# Patient Record
Sex: FEMALE | Race: WHITE | NOT HISPANIC OR LATINO | URBAN - METROPOLITAN AREA
[De-identification: names, ages, dates, MRNs, and addresses within clinical notes are randomized per-mention and may not be internally consistent; named-entity substitution may affect disease eponyms.]

---

## 2017-03-23 ENCOUNTER — OUTPATIENT (OUTPATIENT)
Dept: OUTPATIENT SERVICES | Facility: HOSPITAL | Age: 62
LOS: 1 days | Discharge: HOME | End: 2017-03-23

## 2017-04-24 PROBLEM — Z00.00 ENCOUNTER FOR PREVENTIVE HEALTH EXAMINATION: Status: ACTIVE | Noted: 2017-04-24

## 2017-05-01 ENCOUNTER — APPOINTMENT (OUTPATIENT)
Dept: CARDIOLOGY | Facility: CLINIC | Age: 62
End: 2017-05-01

## 2017-05-01 VITALS
HEIGHT: 64 IN | SYSTOLIC BLOOD PRESSURE: 114 MMHG | BODY MASS INDEX: 36.7 KG/M2 | DIASTOLIC BLOOD PRESSURE: 68 MMHG | WEIGHT: 215 LBS

## 2017-05-01 VITALS — HEART RATE: 76 BPM

## 2017-05-01 DIAGNOSIS — Z83.3 FAMILY HISTORY OF DIABETES MELLITUS: ICD-10-CM

## 2017-05-01 DIAGNOSIS — Z87.891 PERSONAL HISTORY OF NICOTINE DEPENDENCE: ICD-10-CM

## 2017-05-01 DIAGNOSIS — Z82.49 FAMILY HISTORY OF ISCHEMIC HEART DISEASE AND OTHER DISEASES OF THE CIRCULATORY SYSTEM: ICD-10-CM

## 2017-05-01 RX ORDER — METOPROLOL TARTRATE 25 MG/1
25 TABLET, FILM COATED ORAL TWICE DAILY
Qty: 180 | Refills: 3 | Status: ACTIVE | COMMUNITY

## 2017-05-01 RX ORDER — ASPIRIN 81 MG
81 TABLET, DELAYED RELEASE (ENTERIC COATED) ORAL DAILY
Refills: 0 | Status: ACTIVE | COMMUNITY

## 2017-05-01 RX ORDER — ATORVASTATIN CALCIUM 40 MG/1
40 TABLET, FILM COATED ORAL DAILY
Refills: 0 | Status: ACTIVE | COMMUNITY

## 2017-05-01 RX ORDER — CLOPIDOGREL BISULFATE 75 MG/1
75 TABLET, FILM COATED ORAL DAILY
Qty: 30 | Refills: 6 | Status: ACTIVE | COMMUNITY

## 2017-06-27 DIAGNOSIS — Z98.61 CORONARY ANGIOPLASTY STATUS: ICD-10-CM

## 2017-06-27 DIAGNOSIS — E78.4 OTHER HYPERLIPIDEMIA: ICD-10-CM

## 2017-06-27 DIAGNOSIS — I25.2 OLD MYOCARDIAL INFARCTION: ICD-10-CM

## 2017-06-27 DIAGNOSIS — E11.9 TYPE 2 DIABETES MELLITUS WITHOUT COMPLICATIONS: ICD-10-CM

## 2017-06-27 DIAGNOSIS — Z82.49 FAMILY HISTORY OF ISCHEMIC HEART DISEASE AND OTHER DISEASES OF THE CIRCULATORY SYSTEM: ICD-10-CM

## 2017-06-27 DIAGNOSIS — R06.02 SHORTNESS OF BREATH: ICD-10-CM

## 2017-06-27 DIAGNOSIS — E66.9 OBESITY, UNSPECIFIED: ICD-10-CM

## 2017-06-27 DIAGNOSIS — Z79.4 LONG TERM (CURRENT) USE OF INSULIN: ICD-10-CM

## 2017-06-27 DIAGNOSIS — I10 ESSENTIAL (PRIMARY) HYPERTENSION: ICD-10-CM

## 2017-06-27 DIAGNOSIS — I25.10 ATHEROSCLEROTIC HEART DISEASE OF NATIVE CORONARY ARTERY WITHOUT ANGINA PECTORIS: ICD-10-CM

## 2017-08-14 ENCOUNTER — APPOINTMENT (OUTPATIENT)
Dept: CARDIOLOGY | Facility: CLINIC | Age: 62
End: 2017-08-14

## 2017-08-14 VITALS
WEIGHT: 209 LBS | BODY MASS INDEX: 35.68 KG/M2 | DIASTOLIC BLOOD PRESSURE: 60 MMHG | HEART RATE: 79 BPM | SYSTOLIC BLOOD PRESSURE: 104 MMHG | HEIGHT: 64 IN

## 2017-11-06 ENCOUNTER — APPOINTMENT (OUTPATIENT)
Dept: CARDIOLOGY | Facility: CLINIC | Age: 62
End: 2017-11-06

## 2017-12-04 ENCOUNTER — APPOINTMENT (OUTPATIENT)
Dept: CARDIOLOGY | Facility: CLINIC | Age: 62
End: 2017-12-04

## 2018-02-25 ENCOUNTER — INPATIENT (INPATIENT)
Facility: HOSPITAL | Age: 63
LOS: 0 days | Discharge: HOME | End: 2018-02-26
Attending: INTERNAL MEDICINE

## 2018-02-25 VITALS
OXYGEN SATURATION: 98 % | WEIGHT: 195.99 LBS | TEMPERATURE: 96 F | SYSTOLIC BLOOD PRESSURE: 139 MMHG | DIASTOLIC BLOOD PRESSURE: 72 MMHG | RESPIRATION RATE: 20 BRPM | HEART RATE: 70 BPM

## 2018-02-25 DIAGNOSIS — Z98.890 OTHER SPECIFIED POSTPROCEDURAL STATES: Chronic | ICD-10-CM

## 2018-02-25 LAB
ALBUMIN SERPL ELPH-MCNC: 4 G/DL — SIGNIFICANT CHANGE UP (ref 3–5.5)
ALP SERPL-CCNC: 68 U/L — SIGNIFICANT CHANGE UP (ref 30–115)
ALT FLD-CCNC: 16 U/L — SIGNIFICANT CHANGE UP (ref 0–41)
ANION GAP SERPL CALC-SCNC: 10 MMOL/L — SIGNIFICANT CHANGE UP (ref 7–14)
ANION GAP SERPL CALC-SCNC: 8 MMOL/L — SIGNIFICANT CHANGE UP (ref 7–14)
AST SERPL-CCNC: 23 U/L — SIGNIFICANT CHANGE UP (ref 0–41)
B-TYPE NATRIURETIC PEPTIDE BNP RESULT: 14 PG/ML — SIGNIFICANT CHANGE UP (ref 0–99)
BASOPHILS # BLD AUTO: 0.04 K/UL — SIGNIFICANT CHANGE UP (ref 0–0.2)
BASOPHILS NFR BLD AUTO: 0.9 % — SIGNIFICANT CHANGE UP (ref 0–1)
BILIRUB SERPL-MCNC: 0.7 MG/DL — SIGNIFICANT CHANGE UP (ref 0.2–1.2)
BUN SERPL-MCNC: 20 MG/DL — SIGNIFICANT CHANGE UP (ref 10–20)
BUN SERPL-MCNC: 22 MG/DL — HIGH (ref 10–20)
CALCIUM SERPL-MCNC: 9.7 MG/DL — SIGNIFICANT CHANGE UP (ref 8.5–10.1)
CALCIUM SERPL-MCNC: 9.9 MG/DL — SIGNIFICANT CHANGE UP (ref 8.5–10.1)
CHLORIDE SERPL-SCNC: 101 MMOL/L — SIGNIFICANT CHANGE UP (ref 98–110)
CHLORIDE SERPL-SCNC: 104 MMOL/L — SIGNIFICANT CHANGE UP (ref 98–110)
CHOLEST SERPL-MCNC: 180 MG/DL — SIGNIFICANT CHANGE UP (ref 100–200)
CK MB BLD-MCNC: 1 % — SIGNIFICANT CHANGE UP (ref 0–4)
CK MB CFR SERPL CALC: 0.7 NG/ML — SIGNIFICANT CHANGE UP (ref 0.6–6.3)
CK MB CFR SERPL CALC: 0.9 NG/ML — SIGNIFICANT CHANGE UP (ref 0.6–6.3)
CK MB CFR SERPL CALC: 0.9 NG/ML — SIGNIFICANT CHANGE UP (ref 0.6–6.3)
CK SERPL-CCNC: 53 U/L — SIGNIFICANT CHANGE UP (ref 0–225)
CK SERPL-CCNC: 61 U/L — SIGNIFICANT CHANGE UP (ref 0–225)
CK SERPL-CCNC: 68 U/L — SIGNIFICANT CHANGE UP (ref 0–225)
CO2 SERPL-SCNC: 27 MMOL/L — SIGNIFICANT CHANGE UP (ref 17–32)
CO2 SERPL-SCNC: 28 MMOL/L — SIGNIFICANT CHANGE UP (ref 17–32)
CREAT SERPL-MCNC: 1 MG/DL — SIGNIFICANT CHANGE UP (ref 0.7–1.5)
CREAT SERPL-MCNC: 1.1 MG/DL — SIGNIFICANT CHANGE UP (ref 0.7–1.5)
EOSINOPHIL # BLD AUTO: 0.11 K/UL — SIGNIFICANT CHANGE UP (ref 0–0.7)
EOSINOPHIL NFR BLD AUTO: 2.4 % — SIGNIFICANT CHANGE UP (ref 0–8)
GLUCOSE SERPL-MCNC: 103 MG/DL — SIGNIFICANT CHANGE UP (ref 70–110)
GLUCOSE SERPL-MCNC: 90 MG/DL — SIGNIFICANT CHANGE UP (ref 70–110)
HCT VFR BLD CALC: 32.8 % — LOW (ref 37–47)
HCT VFR BLD CALC: 34.4 % — LOW (ref 37–47)
HDLC SERPL-MCNC: 39 MG/DL — LOW (ref 40–60)
HGB BLD-MCNC: 11.1 G/DL — LOW (ref 14–18)
HGB BLD-MCNC: 11.6 G/DL — LOW (ref 14–18)
IMM GRANULOCYTES NFR BLD AUTO: 0 % — LOW (ref 0.1–0.3)
LIPID PNL WITH DIRECT LDL SERPL: 115 MG/DL — HIGH (ref 50–100)
LYMPHOCYTES # BLD AUTO: 2.61 K/UL — SIGNIFICANT CHANGE UP (ref 1.2–3.4)
LYMPHOCYTES # BLD AUTO: 56 % — HIGH (ref 20.5–51.1)
MCHC RBC-ENTMCNC: 28.5 PG — SIGNIFICANT CHANGE UP (ref 27–31)
MCHC RBC-ENTMCNC: 28.7 PG — SIGNIFICANT CHANGE UP (ref 27–31)
MCHC RBC-ENTMCNC: 33.7 G/DL — SIGNIFICANT CHANGE UP (ref 32–37)
MCHC RBC-ENTMCNC: 33.8 G/DL — SIGNIFICANT CHANGE UP (ref 32–37)
MCV RBC AUTO: 84.5 FL — SIGNIFICANT CHANGE UP (ref 81–91)
MCV RBC AUTO: 84.8 FL — SIGNIFICANT CHANGE UP (ref 81–91)
MONOCYTES # BLD AUTO: 0.4 K/UL — SIGNIFICANT CHANGE UP (ref 0.1–0.6)
MONOCYTES NFR BLD AUTO: 8.6 % — SIGNIFICANT CHANGE UP (ref 1.7–9.3)
NEUTROPHILS # BLD AUTO: 1.5 K/UL — SIGNIFICANT CHANGE UP (ref 1.4–6.5)
NEUTROPHILS NFR BLD AUTO: 32.1 % — LOW (ref 42.2–75.2)
NRBC # BLD: 0 /100 WBCS — SIGNIFICANT CHANGE UP (ref 0–0)
NRBC # BLD: 0 /100 WBCS — SIGNIFICANT CHANGE UP (ref 0–0)
PLATELET # BLD AUTO: 321 K/UL — SIGNIFICANT CHANGE UP (ref 130–400)
PLATELET # BLD AUTO: 341 K/UL — SIGNIFICANT CHANGE UP (ref 130–400)
POTASSIUM SERPL-MCNC: 4 MMOL/L — SIGNIFICANT CHANGE UP (ref 3.5–5)
POTASSIUM SERPL-MCNC: 4.2 MMOL/L — SIGNIFICANT CHANGE UP (ref 3.5–5)
POTASSIUM SERPL-SCNC: 4 MMOL/L — SIGNIFICANT CHANGE UP (ref 3.5–5)
POTASSIUM SERPL-SCNC: 4.2 MMOL/L — SIGNIFICANT CHANGE UP (ref 3.5–5)
PROT SERPL-MCNC: 6.5 G/DL — SIGNIFICANT CHANGE UP (ref 6–8)
RBC # BLD: 3.87 M/UL — LOW (ref 4.2–5.4)
RBC # BLD: 4.07 M/UL — LOW (ref 4.2–5.4)
RBC # FLD: 13.7 % — SIGNIFICANT CHANGE UP (ref 11.5–14.5)
RBC # FLD: 13.7 % — SIGNIFICANT CHANGE UP (ref 11.5–14.5)
SODIUM SERPL-SCNC: 138 MMOL/L — SIGNIFICANT CHANGE UP (ref 135–146)
SODIUM SERPL-SCNC: 140 MMOL/L — SIGNIFICANT CHANGE UP (ref 135–146)
TOTAL CHOLESTEROL/HDL RATIO MEASUREMENT: 4.6 RATIO — SIGNIFICANT CHANGE UP (ref 4–5.5)
TRIGL SERPL-MCNC: 160 MG/DL — HIGH (ref 40–150)
TROPONIN I SERPL-MCNC: <0.02 NG/ML — SIGNIFICANT CHANGE UP (ref 0–0.05)
WBC # BLD: 4.66 K/UL — LOW (ref 4.8–10.8)
WBC # BLD: 5.88 K/UL — SIGNIFICANT CHANGE UP (ref 4.8–10.8)
WBC # FLD AUTO: 4.66 K/UL — LOW (ref 4.8–10.8)
WBC # FLD AUTO: 5.88 K/UL — SIGNIFICANT CHANGE UP (ref 4.8–10.8)

## 2018-02-25 RX ORDER — ONDANSETRON 8 MG/1
4 TABLET, FILM COATED ORAL ONCE
Qty: 0 | Refills: 0 | Status: COMPLETED | OUTPATIENT
Start: 2018-02-25 | End: 2018-02-25

## 2018-02-25 RX ORDER — NITROGLYCERIN 6.5 MG
0.4 CAPSULE, EXTENDED RELEASE ORAL ONCE
Qty: 0 | Refills: 0 | Status: COMPLETED | OUTPATIENT
Start: 2018-02-25 | End: 2018-02-25

## 2018-02-25 RX ORDER — CLOPIDOGREL BISULFATE 75 MG/1
75 TABLET, FILM COATED ORAL DAILY
Qty: 0 | Refills: 0 | Status: DISCONTINUED | OUTPATIENT
Start: 2018-02-25 | End: 2018-02-26

## 2018-02-25 RX ORDER — NITROGLYCERIN 6.5 MG
0.3 CAPSULE, EXTENDED RELEASE ORAL ONCE
Qty: 0 | Refills: 0 | Status: DISCONTINUED | OUTPATIENT
Start: 2018-02-25 | End: 2018-02-25

## 2018-02-25 RX ORDER — ACETAMINOPHEN 500 MG
650 TABLET ORAL ONCE
Qty: 0 | Refills: 0 | Status: COMPLETED | OUTPATIENT
Start: 2018-02-25 | End: 2018-02-25

## 2018-02-25 RX ORDER — ATORVASTATIN CALCIUM 80 MG/1
40 TABLET, FILM COATED ORAL AT BEDTIME
Qty: 0 | Refills: 0 | Status: DISCONTINUED | OUTPATIENT
Start: 2018-02-25 | End: 2018-02-26

## 2018-02-25 RX ORDER — ENOXAPARIN SODIUM 100 MG/ML
80 INJECTION SUBCUTANEOUS ONCE
Qty: 0 | Refills: 0 | Status: DISCONTINUED | OUTPATIENT
Start: 2018-02-25 | End: 2018-02-25

## 2018-02-25 RX ORDER — ASPIRIN/CALCIUM CARB/MAGNESIUM 324 MG
162 TABLET ORAL ONCE
Qty: 0 | Refills: 0 | Status: COMPLETED | OUTPATIENT
Start: 2018-02-25 | End: 2018-02-25

## 2018-02-25 RX ORDER — METOPROLOL TARTRATE 50 MG
25 TABLET ORAL
Qty: 0 | Refills: 0 | Status: DISCONTINUED | OUTPATIENT
Start: 2018-02-25 | End: 2018-02-26

## 2018-02-25 RX ORDER — ASPIRIN/CALCIUM CARB/MAGNESIUM 324 MG
81 TABLET ORAL DAILY
Qty: 0 | Refills: 0 | Status: DISCONTINUED | OUTPATIENT
Start: 2018-02-25 | End: 2018-02-26

## 2018-02-25 RX ORDER — ENOXAPARIN SODIUM 100 MG/ML
90 INJECTION SUBCUTANEOUS ONCE
Qty: 0 | Refills: 0 | Status: COMPLETED | OUTPATIENT
Start: 2018-02-25 | End: 2018-02-25

## 2018-02-25 RX ORDER — MORPHINE SULFATE 50 MG/1
2 CAPSULE, EXTENDED RELEASE ORAL ONCE
Qty: 0 | Refills: 0 | Status: DISCONTINUED | OUTPATIENT
Start: 2018-02-25 | End: 2018-02-25

## 2018-02-25 RX ORDER — INFLUENZA VIRUS VACCINE 15; 15; 15; 15 UG/.5ML; UG/.5ML; UG/.5ML; UG/.5ML
0.5 SUSPENSION INTRAMUSCULAR ONCE
Qty: 0 | Refills: 0 | Status: COMPLETED | OUTPATIENT
Start: 2018-02-25 | End: 2018-02-25

## 2018-02-25 RX ADMIN — Medication 25 MILLIGRAM(S): at 17:27

## 2018-02-25 RX ADMIN — ONDANSETRON 4 MILLIGRAM(S): 8 TABLET, FILM COATED ORAL at 01:54

## 2018-02-25 RX ADMIN — ENOXAPARIN SODIUM 90 MILLIGRAM(S): 100 INJECTION SUBCUTANEOUS at 17:26

## 2018-02-25 RX ADMIN — ATORVASTATIN CALCIUM 40 MILLIGRAM(S): 80 TABLET, FILM COATED ORAL at 22:12

## 2018-02-25 RX ADMIN — Medication 162 MILLIGRAM(S): at 01:38

## 2018-02-25 RX ADMIN — Medication 650 MILLIGRAM(S): at 04:00

## 2018-02-25 RX ADMIN — Medication 0.4 MILLIGRAM(S): at 12:18

## 2018-02-25 RX ADMIN — Medication 25 MILLIGRAM(S): at 06:24

## 2018-02-25 RX ADMIN — CLOPIDOGREL BISULFATE 75 MILLIGRAM(S): 75 TABLET, FILM COATED ORAL at 12:18

## 2018-02-25 RX ADMIN — MORPHINE SULFATE 2 MILLIGRAM(S): 50 CAPSULE, EXTENDED RELEASE ORAL at 10:13

## 2018-02-25 RX ADMIN — Medication 650 MILLIGRAM(S): at 03:19

## 2018-02-25 RX ADMIN — MORPHINE SULFATE 2 MILLIGRAM(S): 50 CAPSULE, EXTENDED RELEASE ORAL at 10:34

## 2018-02-25 RX ADMIN — Medication 81 MILLIGRAM(S): at 12:18

## 2018-02-25 NOTE — H&P ADULT - NSHPLABSRESULTS_GEN_ALL_CORE
11.1   5.88  )-----------( 321      ( 25 Feb 2018 01:54 )             32.8       02-25    140  |  104  |  22<H>  ----------------------------<  90  4.0   |  28  |  1.1    Ca    9.7      25 Feb 2018 01:54    TPro  6.5  /  Alb  4.0  /  TBili  0.7  /  DBili  x   /  AST  23  /  ALT  16  /  AlkPhos  68  02-25      CARDIAC MARKERS ( 25 Feb 2018 01:54 )  <0.02 ng/mL / x     / 68 U/L / x     / 0.9 ng/mL 11.1   5.88  )-----------( 321      ( 25 Feb 2018 01:54 )             32.8       02-25    140  |  104  |  22<H>  ----------------------------<  90  4.0   |  28  |  1.1    Ca    9.7      25 Feb 2018 01:54    TPro  6.5  /  Alb  4.0  /  TBili  0.7  /  DBili  x   /  AST  23  /  ALT  16  /  AlkPhos  68  02-25      CARDIAC MARKERS ( 25 Feb 2018 01:54 )  <0.02 ng/mL / x     / 68 U/L / x     / 0.9 ng/mL    CXR : left pleural effusion , [facility],   f/u official result

## 2018-02-25 NOTE — H&P ADULT - HISTORY OF PRESENT ILLNESS
61yo F known to have history of HTN, DL, DMII, CAD s/p STEMI with PCI to prox RCA with JUSTEN in feb 2017, then PCI with JUSTEN to 1st diagonal in march 2017 by Dr Ellis.   She presents to the ED after she had sudden onset of left sided chest pain , at rest , radiating to the left shoulder , constant, lasting for ore than 1 hour. No other symptoms. She reports that she is been similar but milder pain at rest , but this pain was more constant.   It's similar to the pain of her last heart attack.   Last saw Dr ellis in oct 2017 61yo F known to have history of HTN, DL, DMII, CAD s/p STEMI with PCI to prox RCA with JUSTEN in feb 2017, then PCI with JUSTEN to 1st diagonal in march 2017 by Dr Ellis.   She presents to the ED after she had sudden onset of left sided chest pain around 11pm this past night , at rest , radiating to the left shoulder , constant, lasting for ore than 1 hour. No other symptoms. She reports that she is been similar but milder pain at rest , but this pain was more constant.   It's similar to the pain of her last heart attack.   Last saw Dr ellis in oct 2017

## 2018-02-25 NOTE — CONSULT NOTE ADULT - ASSESSMENT
1-Chest pain r/o ischemic pain vs musculoskeletal in setting of anxiety   2-CAD s/p PCI x2 in 2017      Plan;  -tele  -2Decho  -Trend CE x3   -Continue ASA, Plavix  -Continue Statin, BB  -Lovenox 1mg/Kg x1 today.   -If CE neg , adenosine nuclear stress test    Will continue to follow 1-Chest pain r/o ischemic pain vs musculoskeletal in setting of anxiety   2-CAD s/p PCI x2 in 2017      Plan;  -tele  -2Decho  -Trend CE x3   -Continue ASA, Plavix  -Continue Statin, BB  -Lovenox 1mg/Kg x1 today.   -If CE neg , adenosine nuclear stress test    Will continue to follow     Attending: Patient seen and examined. D/w the fellow. Agree with findings as documented by the fellow.  CE negative.  Atypical chest pain. D/c Lovenox.  C/w medical therapy.  Stress MPI today. If negative for ischemia, d/c home today.

## 2018-02-25 NOTE — H&P ADULT - NSHPREVIEWOFSYSTEMS_GEN_ALL_CORE
REVIEW OF SYSTEMS:    CONSTITUTIONAL: No weakness, fevers or chills  EYES/ENT: No visual changes;  No vertigo or throat pain   NECK: No pain or stiffness  RESPIRATORY: No cough, wheezing, hemoptysis; No shortness of breath  GASTROINTESTINAL: No abdominal or epigastric pain. No nausea, vomiting, or hematemesis; No diarrhea or constipation. No melena or hematochezia.  GENITOURINARY: No dysuria, frequency or hematuria  NEUROLOGICAL: No numbness or weakness  SKIN: No itching, rashes

## 2018-02-25 NOTE — H&P ADULT - ASSESSMENT
63yo F known to have history of HTN, DL, DMII, CAD s/p PCI to RCA and 1st Dg 03/2017 with JUSTEN, presented with acute onset of chest pain, at rest, radiating to the left shoulder , severe and constant for more than 1 hour.     vital stable. ECG no acute changes. CE 1set neg    #chest pain- r/o ACS  -admit to tele  -ECG, 2nd set of cardiac enzy  -ASA, plavix, Metoprolol, atorvastatin  -HDL profile  -2d echo  -cardiology consult    #DM II-  -hold OAD, FS AC, if > 180, start insulin   -check Hba1C    # DVT PPX 61yo F known to have history of HTN, DL, DMII, CAD s/p PCI to RCA and 1st Dg 03/2017 with JUSTEN, presented with acute onset of chest pain, at rest, radiating to the left shoulder , severe and constant for more than 1 hour.     vital stable. ECG no acute changes. CE 1set neg    #Chest pain- r/o ACS  -admit to tele  -ECG, 2nd set of cardiac enzy  -ASA, plavix, Metoprolol, atorvastatin  -HDL profile  -2d echo  -cardiology consult    #DM II  -hold OAD, FS AC, if > 180, start insulin   -check Hba1C    # DVT PPX, out of bed to chair, full code

## 2018-02-25 NOTE — ED PROVIDER NOTE - MEDICAL DECISION MAKING DETAILS
I have personally performed a history and physical exam on this patient and personally directed the management of the patient. Patient presents with chest pain that started suddenly on the left side it is non-exertional in nature with no fevers and no chills.  On physical exam the patient is nc/at perrla eomi oropharynx clear cta b/l, rrr s1s2 noted abd soft nt nd bs +e xt from with no edema at this time based on her past medical history we obtained ekg which in non-ischemic, we obtained cardiac enzymes which are negative at this time. I will admit the patient for repeat cardiac enzymes and potential provacative testing.   family updated with the plan of care

## 2018-02-25 NOTE — CONSULT NOTE ADULT - SUBJECTIVE AND OBJECTIVE BOX
CHIEF COMPLAINT: Chest pain    HISTORY OF PRESENT ILLNESS:     This is a 62 years old  female patient with hx of CAD s/p STEMI with PCI to prox RCA with JUSTEN in 2017, then PCI with JUSTEN to 1st diagonal in 2017 by Dr Ellis.  known to have history of HTN, DL, DMII, CAD s/p STEMI        She presents to the ED after she had sudden onset of left sided chest pain around 11pm this past night , at rest , radiating to the left shoulder , constant, lasting for ore than 1 hour. No other symptoms. She reports that she is been similar but milder pain at rest , but this pain was more constant.   It's similar to the pain of her last heart attack.   Last saw Dr ellis in oct 2017 (2018 03:54)    PAST MEDICAL & SURGICAL HISTORY:  ST elevation myocardial infarction (STEMI), unspecified artery  CAD (coronary artery disease)  DM (diabetes mellitus)  HTN (hypertension)  S/P excision of lipoma: shoulder  S/p bilateral carpal tunnel release    FAMILY HISTORY:  No pertinent family history in first degree relatives    Allergies    No Known Allergies    Intolerances    	  Home Medications:  aspirin 81 mg oral tablet: 1 tab(s) orally once a day (2018 03:56)  atorvastatin 40 mg oral tablet: 1 tab(s) orally once a day (2018 03:56)  glimepiride 4 mg oral tablet: 1 tab(s) orally 2 times a day (2018 03:56)  Janumet 50 mg-1000 mg oral tablet: 1 tab(s) orally 2 times a day (2018 03:56)  Metoprolol Tartrate 25 mg oral tablet: 1 tab(s) orally 2 times a day (2018 03:56)  Plavix 75 mg oral tablet: 1 tab(s) orally once a day (2018 03:56)    MEDICATIONS  (STANDING):  aspirin enteric coated 81 milliGRAM(s) Oral daily  atorvastatin 40 milliGRAM(s) Oral at bedtime  clopidogrel Tablet 75 milliGRAM(s) Oral daily  metoprolol     tartrate 25 milliGRAM(s) Oral two times a day    MEDICATIONS  (PRN):        SOCIAL HISTORY:    [ ] Non-smoker  [ ] Smoker  [ ] Alcohol      REVIEW OF SYSTEMS:    PHYSICAL EXAM:  T(C): 35.9 (18 @ 06:15), Max: 35.9 (18 @ 06:15)  HR: 64 (18 @ 06:15) (64 - 70)  BP: 124/63 (18 @ 06:15) (108/57 - 139/72)  RR: 18 (18 @ 06:15) (18 - 20)  SpO2: 98% (18 @ 06:15) (98% - 98%)  Wt(kg): --  I&O's Summary    Daily Height in cm: 165.1 (2018 06:15)    Daily Weight in k (2018 06:15)    General Appearance: Normal	  Cardiovascular: Normal S1 S2, No JVD, No murmurs, No edema  Respiratory: Lungs clear to auscultation	  Psychiatry: A & O x 3, Mood & affect appropriate  Gastrointestinal:  Soft, Non-tender  Skin: No rashes, No ecchymoses, No cyanosis	  Neurologic: Non-focal  Extremities: Normal range of motion, No clubbing, cyanosis or edema  Vascular: Peripheral pulses palpable 2+ bilaterally        LABS:	 	                        11.6   4.66  )-----------( 341      ( 2018 10:09 )             34.4         138  |  101  |  20  ----------------------------<  103  4.2   |  27  |  1.0      140  |  104  |  22<H>  ----------------------------<  90  4.0   |  28  |  1.1    Ca    9.9      2018 10:09  Ca    9.7      2018 01:54    TPro  6.5  /  Alb  4.0  /  TBili  0.7  /  DBili  x   /  AST  23  /  ALT  16  /  AlkPhos  68        proBNP:   Lipid Profile:   HgA1c: Hemoglobin A1C, Whole Blood: 6.0 % ( @ 10:09)    TSH:       CARDIAC MARKERS:  Troponin I, Serum: <0.02 ng/mL ( @ 10:09)  Troponin I, Serum: <0.02 ng/mL ( @ 01:54)            TELEMETRY EVENTS: 	    ECG:  	  RADIOLOGY:  	    PREVIOUS DIAGNOSTIC TESTING:    [ ] Echocardiogram:  [ ]  Catheterization:  [ ] Stress Test:
CHIEF COMPLAINT: Chest pain    HISTORY OF PRESENT ILLNESS:     This is a 62 years old  female patient with hx of CAD s/p STEMI with PCI to prox RCA with UJSTEN in 2017, then PCI with JUSTEN to 1st diagonal in 2017 by Dr Monroy.  known to have history of HTN, DL, DMII, CAD presented to ED for continuous left sided chest pain radiating to left shoulder and neck. Increased mild movement or activity. not related to respiratory movement. Shoulder pain increase with arm movement. Not improving SL nitro partially relieved with morphine. EKG showed normal sinus rhythm with no  acute ischemic ST/T wave changes. Cardiac enzyme negative x2. Patient seen at beside, looks anxious still complaining of chest discomfort. no active shortness of breath or palpitations.    Since last PCI patient complained with similar episode of chest pain that last few hours then resolved by itself.     PAST MEDICAL & SURGICAL HISTORY:  ST elevation myocardial infarction (STEMI), unspecified artery  CAD (coronary artery disease)  DM (diabetes mellitus)  HTN (hypertension)  S/P excision of lipoma: shoulder  S/p bilateral carpal tunnel release    FAMILY HISTORY:  No pertinent family history in first degree relatives    Allergies  No Known Allergies      	  Home Medications:  aspirin 81 mg oral tablet: 1 tab(s) orally once a day (2018 03:56)  atorvastatin 40 mg oral tablet: 1 tab(s) orally once a day (2018 03:56)  glimepiride 4 mg oral tablet: 1 tab(s) orally 2 times a day (2018 03:56)  Janumet 50 mg-1000 mg oral tablet: 1 tab(s) orally 2 times a day (2018 03:56)  Metoprolol Tartrate 25 mg oral tablet: 1 tab(s) orally 2 times a day (2018 03:56)  Plavix 75 mg oral tablet: 1 tab(s) orally once a day (2018 03:56)    MEDICATIONS  (STANDING):  aspirin enteric coated 81 milliGRAM(s) Oral daily  atorvastatin 40 milliGRAM(s) Oral at bedtime  clopidogrel Tablet 75 milliGRAM(s) Oral daily  metoprolol     tartrate 25 milliGRAM(s) Oral two times a day      PHYSICAL EXAM:  T(C): 35.9 (18 @ 06:15), Max: 35.9 (18 @ 06:15)  HR: 64 (18 @ 06:15) (64 - 70)  BP: 124/63 (18 @ 06:15) (108/57 - 139/72)  RR: 18 (18 @ 06:15) (18 - 20)  SpO2: 98% (18 @ 06:15) (98% - 98%)    Daily Height in cm: 165.1 (2018 06:15)    Daily Weight in k (2018 06:15)    General Appearance: Normal	  Cardiovascular: Normal S1 S2, No JVD, No murmurs, No edema  Respiratory: Lungs clear to auscultation	shoulder tenderness upon raising the left arm   Psychiatry: A & O x 3, Mood & affect appropriate  Gastrointestinal:  Soft, Non-tender  Extremities: Normal range of motion, No clubbing, cyanosis or edema          LABS:	 	                        11.6   4.66  )-----------( 341      ( 2018 10:09 )             34.4         138  |  101  |  20  ----------------------------<  103  4.2   |  27  |  1.0      140  |  104  |  22<H>  ----------------------------<  90  4.0   |  28  |  1.1    Ca    9.9      2018 10:09  Ca    9.7      2018 01:54    TPro  6.5  /  Alb  4.0  /  TBili  0.7  /  DBili  x   /  AST  23  /  ALT  16  /  AlkPhos  68          CARDIAC MARKERS:  Troponin I, Serum: <0.02 ng/mL ( @ 10:09)  Troponin I, Serum: <0.02 ng/mL ( @ 01:54)        TELEMETRY EVENTS: sinus rhythm no major events 	    ECG:  < from: 12 Lead ECG (18 @ 05:57) >  Normal sinus rhythm  Low voltage QRS    < end of copied text >  	  RADIOLOGY: CXR negative  	    PREVIOUS DIAGNOSTIC TESTING:    [ ] Echocardiogram: 2017  Summary   Clinical question: s/p 1 stent. 0222.17, HTN, DM, CHOL, LVF.   Left ventricle: Systolic function was at the lower limits of normal by visual   assessment. Ejection fraction was estimated in the range of 50 % to 55 %.   There was hypokinesis of the basal inferolateral wall(s). Doppler parameters   were consistent with abnormal left ventricular relaxation (grade 1 diastolic   dysfunction).   Tricuspid valve: There was mild regurgitation.     [ ]  Catheterization:  2017  CORONARY CIRCULATION: The coronary circulation is right dominant. There was   severe 1-vessel coronary artery disease (RCA). Left main: Normal. LAD: The   vessel was medium sized. Angiography showed minor luminal irregularities with   no flow limiting lesions. 1st diagonal: There was a tubular 85 % stenosis.   There was BELLA grade 3 flow through the vessel (brisk flow). Circumflex: The   vessel was medium sized (co-dominant). Angiography showed minor luminal   irregularities with no flow limiting lesions. Proximal RCA: There was a 100 %   stenosis.   A successful balloon angioplasty with stent was   performed on the 100 % lesion in the proximal RCA. Following intervention   there was an excellent angiographic appearance with a 0 % residual stenosis.     3/23/2017    1ST LESION INTERVENTIONS: A balloon angioplasty with stent was performed on   the 90 % lesion in the 1st diagonal. Following intervention there was a 0 %   residual stenosis.

## 2018-02-25 NOTE — H&P ADULT - NSHPPHYSICALEXAM_GEN_ALL_CORE
T(C): 35.7 (02-25-18 @ 00:52), Max: 35.7 (02-25-18 @ 00:52)  HR: 67 (02-25-18 @ 03:19) (67 - 70)  BP: 108/57 (02-25-18 @ 03:19) (108/57 - 139/72)  RR: 18 (02-25-18 @ 03:19) (18 - 20)  SpO2: 98% (02-25-18 @ 03:19) (98% - 98%)    Vital Signs Last 24 Hrs  T(C): 35.7 (25 Feb 2018 00:52), Max: 35.7 (25 Feb 2018 00:52)  T(F): 96.2 (25 Feb 2018 00:52), Max: 96.2 (25 Feb 2018 00:52)  HR: 67 (25 Feb 2018 03:19) (67 - 70)  BP: 108/57 (25 Feb 2018 03:19) (108/57 - 139/72)  BP(mean): --  RR: 18 (25 Feb 2018 03:19) (18 - 20)  SpO2: 98% (25 Feb 2018 03:19) (98% - 98%)    PHYSICAL EXAM:  GENERAL: NAD, well-developed  HEAD:  Atraumatic, Normocephalic  EYES: EOMI, PERRLA, conjunctiva and sclera clear  ENT: Normal tympanic membrane. No nasal obstruction or discharge. No tonsillar exudate, swelling or erythema.  NECK: Supple, No JVD  CHEST/LUNG: Clear to auscultation bilaterally; No wheeze  HEART: Regular rate and rhythm; No murmurs, rubs, or gallops  ABDOMEN: Soft, Nontender, Nondistended; Bowel sounds present  EXTREMITIES:  2+ Peripheral Pulses, No clubbing, cyanosis, or edema  PSYCH: AAOx3  NEUROLOGY: non-focal  SKIN: No rashes or lesions

## 2018-02-25 NOTE — H&P ADULT - ATTENDING COMMENTS
This is 61 yo Female with PMH of HTN, DM type 2 and CAD, STEMI s/p PCI to prox RCA with JUSTEN in feb 2017, then PCI with JUSTEN to 1st diagonal in march 2017 by Dr Monroy. She came to ED for left sided chest pain started last night while she was sitting at home, the pain radiates to left shoulder 10/10 lasted > 30 minutes, she has no nasuea or vomiting, no sweating,   In the ED her vital signs were stable, EKG showed no acue ischemic changes, cardiac enzymes were negative.   patient was admitted to Telemetry to rule out ACS.     Physical exam:   Alert, oriented x3, in no distress.   HEENT: PERRLA,   neck: no JVD  Chest: Clear, no rales or crackles.   Heart: RRR s1 s2, no murmurs  Abdomen: soft, non-tender. no megalies.   Ext: no edema, pulses are full b/l.     A/P:   Chest Pain: hx of STEMI, s/p PCI  BELLA score: 2, cardiac enzymes x2 negative  Continue ASA, Plavix, metoprolol and Lipitor.   Cardiology consult, possible NST.     HTN:   Continue Metoprolol.     DM type 2:   hold oral agent, HISS .

## 2018-02-25 NOTE — PATIENT PROFILE ADULT. - LANGUAGE ASSISTANCE NEEDED
No-Patient/Caregiver offered and refused free interpretation services.
No-Patient/Caregiver offered and refused free interpretation services.

## 2018-02-25 NOTE — ED PROVIDER NOTE - OBJECTIVE STATEMENT
61yo F hx HTN DM DL CAD sp PCI x2 by Dr. Monroy last year p/w L side chest pain x1hr, started out of nowhere, constant, non radiating, +nausea and LUE numbness- no weakness, no f/c/v/d, shortness of breath, abdominal pain, dysuria/hematuria, back pain

## 2018-02-26 ENCOUNTER — TRANSCRIPTION ENCOUNTER (OUTPATIENT)
Age: 63
End: 2018-02-26

## 2018-02-26 VITALS
DIASTOLIC BLOOD PRESSURE: 53 MMHG | TEMPERATURE: 97 F | SYSTOLIC BLOOD PRESSURE: 111 MMHG | RESPIRATION RATE: 18 BRPM | HEART RATE: 79 BPM

## 2018-02-26 LAB
ANION GAP SERPL CALC-SCNC: 9 MMOL/L — SIGNIFICANT CHANGE UP (ref 7–14)
BUN SERPL-MCNC: 23 MG/DL — HIGH (ref 10–20)
CALCIUM SERPL-MCNC: 9 MG/DL — SIGNIFICANT CHANGE UP (ref 8.5–10.1)
CHLORIDE SERPL-SCNC: 100 MMOL/L — SIGNIFICANT CHANGE UP (ref 98–110)
CO2 SERPL-SCNC: 23 MMOL/L — SIGNIFICANT CHANGE UP (ref 17–32)
CREAT SERPL-MCNC: 0.9 MG/DL — SIGNIFICANT CHANGE UP (ref 0.7–1.5)
GLUCOSE SERPL-MCNC: 97 MG/DL — SIGNIFICANT CHANGE UP (ref 70–110)
HCT VFR BLD CALC: 34.2 % — LOW (ref 37–47)
HGB BLD-MCNC: 11.4 G/DL — LOW (ref 14–18)
MCHC RBC-ENTMCNC: 28.5 PG — SIGNIFICANT CHANGE UP (ref 27–31)
MCHC RBC-ENTMCNC: 33.3 G/DL — SIGNIFICANT CHANGE UP (ref 32–37)
MCV RBC AUTO: 85.5 FL — SIGNIFICANT CHANGE UP (ref 81–91)
NRBC # BLD: 0 /100 WBCS — SIGNIFICANT CHANGE UP (ref 0–0)
PLATELET # BLD AUTO: 298 K/UL — SIGNIFICANT CHANGE UP (ref 130–400)
POTASSIUM SERPL-MCNC: 4.1 MMOL/L — SIGNIFICANT CHANGE UP (ref 3.5–5)
POTASSIUM SERPL-SCNC: 4.1 MMOL/L — SIGNIFICANT CHANGE UP (ref 3.5–5)
RBC # BLD: 4 M/UL — LOW (ref 4.2–5.4)
RBC # FLD: 13.7 % — SIGNIFICANT CHANGE UP (ref 11.5–14.5)
SODIUM SERPL-SCNC: 132 MMOL/L — LOW (ref 135–146)
WBC # BLD: 4.46 K/UL — LOW (ref 4.8–10.8)
WBC # FLD AUTO: 4.46 K/UL — LOW (ref 4.8–10.8)

## 2018-02-26 RX ORDER — ISOSORBIDE DINITRATE 5 MG/1
1 TABLET ORAL
Qty: 28 | Refills: 0
Start: 2018-02-26 | End: 2018-03-11

## 2018-02-26 RX ORDER — ADENOSINE 3 MG/ML
60 INJECTION INTRAVENOUS ONCE
Qty: 0 | Refills: 0 | Status: DISCONTINUED | OUTPATIENT
Start: 2018-02-26 | End: 2018-02-26

## 2018-02-26 RX ADMIN — Medication 81 MILLIGRAM(S): at 15:59

## 2018-02-26 RX ADMIN — CLOPIDOGREL BISULFATE 75 MILLIGRAM(S): 75 TABLET, FILM COATED ORAL at 15:58

## 2018-02-26 NOTE — PROGRESS NOTE ADULT - SUBJECTIVE AND OBJECTIVE BOX
DARIELA WALKER  62y  Female      Patient is a 62y old  Female who presents with a chief complaint of chest pain (26 Feb 2018 16:08)      INTERVAL HPI/OVERNIGHT EVENTS:  She still with mild left chest  pain.     Vital Signs Last 24 Hrs  T(C): 35.9 (26 Feb 2018 15:10), Max: 36.3 (25 Feb 2018 20:55)  T(F): 96.6 (26 Feb 2018 15:10), Max: 97.3 (25 Feb 2018 20:55)  HR: 79 (26 Feb 2018 15:10) (75 - 79)  BP: 111/53 (26 Feb 2018 15:10) (94/44 - 123/57)  BP(mean): --  RR: 18 (26 Feb 2018 15:10) (16 - 18)  SpO2: --            Consultant(s) Notes Reviewed:  [x ] YES  [ ] NO          MEDICATIONS  (STANDING):  adenosine Injectable (ADENOSCAN) 60 milliGRAM(s) IV Bolus once  aspirin enteric coated 81 milliGRAM(s) Oral daily  atorvastatin 40 milliGRAM(s) Oral at bedtime  clopidogrel Tablet 75 milliGRAM(s) Oral daily  metoprolol     tartrate 25 milliGRAM(s) Oral two times a day    MEDICATIONS  (PRN):      LABS                          11.4   4.46  )-----------( 298      ( 26 Feb 2018 06:51 )             34.2     02-26    132<L>  |  100  |  23<H>  ----------------------------<  97  4.1   |  23  |  0.9    Ca    9.0      26 Feb 2018 06:51    TPro  6.5  /  Alb  4.0  /  TBili  0.7  /  DBili  x   /  AST  23  /  ALT  16  /  AlkPhos  68  02-25          Lactate Trend    CARDIAC MARKERS ( 25 Feb 2018 18:26 )  <0.02 ng/mL / x     / 53 U/L / x     / 0.7 ng/mL  CARDIAC MARKERS ( 25 Feb 2018 10:09 )  <0.02 ng/mL / x     / 61 U/L / x     / 0.9 ng/mL  CARDIAC MARKERS ( 25 Feb 2018 01:54 )  <0.02 ng/mL / x     / 68 U/L / x     / 0.9 ng/mL      CAPILLARY BLOOD GLUCOSE  203 (26 Feb 2018 16:26)            RADIOLOGY & ADDITIONAL TESTS:    Imaging Personally Reviewed:  [ ] YES  [ ] NO    HEALTH ISSUES - PROBLEM Dx:          Physical exam:   Alert, oriented x3, in no distress.   HEENT: PERRLA,   neck: no JVD  Chest: Clear, no rales or crackles.   Heart: RRR s1 s2, no murmurs  Abdomen: soft, non-tender. no megalies.   Ext: no edema, pulses are full b/l.

## 2018-02-26 NOTE — DISCHARGE NOTE ADULT - MEDICATION SUMMARY - MEDICATIONS TO TAKE
I will START or STAY ON the medications listed below when I get home from the hospital:    aspirin 81 mg oral tablet  -- 1 tab(s) by mouth once a day  -- Indication: For CAD (coronary artery disease)    Isordil Titradose 5 mg oral tablet  -- 1 tab(s) by mouth 2 times a day   -- Do not drink alcoholic beverages when taking this medication.  It is very important that you take or use this exactly as directed.  Do not skip doses or discontinue unless directed by your doctor.    -- Indication: For CHEST PAIN    glimepiride 4 mg oral tablet  -- 1 tab(s) by mouth 2 times a day  -- Indication: For DM (diabetes mellitus)    Janumet 50 mg-1000 mg oral tablet  -- 1 tab(s) by mouth 2 times a day  -- Indication: For DM (diabetes mellitus)    atorvastatin 40 mg oral tablet  -- 1 tab(s) by mouth once a day  -- Indication: For DLD    Plavix 75 mg oral tablet  -- 1 tab(s) by mouth once a day  -- Indication: For CAD (coronary artery disease)    Metoprolol Tartrate 25 mg oral tablet  -- 1 tab(s) by mouth 2 times a day  -- Indication: For HTN (hypertension)

## 2018-02-26 NOTE — DISCHARGE NOTE ADULT - HOSPITAL COURSE
63yo F known to have history of HTN, DL, DMII, CAD s/p STEMI with PCI to prox RCA with JUSTEN in feb 2017, then PCI with JUSTEN to 1st diagonal in march 2017 by Dr Ellis. She presented to the ED after she had sudden onset of left sided chest pain, at rest , radiating to the left shoulder , constant, lasting for more than 1 hour. No other symptoms. She reports that she has had similar but milder pain at rest , but this pain was more constant.   It's similar to the pain of her last heart attack.   Last saw Dr ellis in oct 2017  Cardiac enzymes were negative x2, EKG showed no acute changes. Patient completed adenosine stress test which showed no signs of ischemia. Cleared for discharge by Dr. Ellis and instructed to follow up as an outpatient. Pt can continue aspirin, plavix, metoprolol and lipitor. Pt also to start taking isordil for anginal pain. Patients pain is currently much improved. Instructed to follow up with Dr. Ellis. Stable for discharge home, plan discussed with family at bedside.

## 2018-02-26 NOTE — DISCHARGE NOTE ADULT - CARE PLAN
Principal Discharge DX:	Chest pain  Goal:	resolution and prevention  Assessment and plan of treatment:	Pt came to the ED with sudden onset left sided chest pain with radiation to the left shoulder, constant, lasting for more than 1 hr. Similar to how she felt when she had her last MI. Cardiac enzymes were negative x3. Cardiac stress test also negative. Pt able to be discharged with her usual medication regimen, addition of Isordil for anginal pain and close follow up with Dr. Monroy.

## 2018-02-26 NOTE — DISCHARGE NOTE ADULT - PLAN OF CARE
resolution and prevention Pt came to the ED with sudden onset left sided chest pain with radiation to the left shoulder, constant, lasting for more than 1 hr. Similar to how she felt when she had her last MI. Cardiac enzymes were negative x3. Cardiac stress test also negative. Pt able to be discharged with her usual medication regimen, addition of Isordil for anginal pain and close follow up with Dr. Monroy.

## 2018-02-26 NOTE — DISCHARGE NOTE ADULT - PATIENT PORTAL LINK FT
You can access the [facility] Patient Portal, offered by [facility], by registering with the following website: [email or web address]

## 2018-02-26 NOTE — DISCHARGE NOTE ADULT - CARE PROVIDER_API CALL
Noé Monroy (MD), Cardiovascular Disease; Internal Medicine; Interventional Cardiology; Nuclear Cardiology  [address] [place] [address]  Phone: [phone]  Fax: [phone]

## 2018-02-26 NOTE — PROGRESS NOTE ADULT - ASSESSMENT
A/P:   Chest Pain: hx of STEMI, s/p PCI  BELLA score: 2, cardiac enzymes x3 negative.   Nuclear Stress test negative for acute ischemia.   Continue ASA, Plavix, metoprolol and Lipitor.   Add Isordil      HTN:   Continue Metoprolol.     DM type 2:   continue home meds.     Discharge home  follow up with her PCP and cardiology clinic.

## 2018-02-28 DIAGNOSIS — I25.2 OLD MYOCARDIAL INFARCTION: ICD-10-CM

## 2018-02-28 DIAGNOSIS — Z95.5 PRESENCE OF CORONARY ANGIOPLASTY IMPLANT AND GRAFT: ICD-10-CM

## 2018-02-28 DIAGNOSIS — Z79.84 LONG TERM (CURRENT) USE OF ORAL HYPOGLYCEMIC DRUGS: ICD-10-CM

## 2018-02-28 DIAGNOSIS — E78.5 HYPERLIPIDEMIA, UNSPECIFIED: ICD-10-CM

## 2018-02-28 DIAGNOSIS — R07.89 OTHER CHEST PAIN: ICD-10-CM

## 2018-02-28 DIAGNOSIS — E11.9 TYPE 2 DIABETES MELLITUS WITHOUT COMPLICATIONS: ICD-10-CM

## 2018-02-28 DIAGNOSIS — I10 ESSENTIAL (PRIMARY) HYPERTENSION: ICD-10-CM

## 2018-02-28 DIAGNOSIS — R07.9 CHEST PAIN, UNSPECIFIED: ICD-10-CM

## 2018-02-28 DIAGNOSIS — I25.10 ATHEROSCLEROTIC HEART DISEASE OF NATIVE CORONARY ARTERY WITHOUT ANGINA PECTORIS: ICD-10-CM

## 2018-03-12 ENCOUNTER — RECORD ABSTRACTING (OUTPATIENT)
Age: 63
End: 2018-03-12

## 2018-04-02 ENCOUNTER — APPOINTMENT (OUTPATIENT)
Dept: CARDIOLOGY | Facility: CLINIC | Age: 63
End: 2018-04-02

## 2018-04-02 VITALS
SYSTOLIC BLOOD PRESSURE: 120 MMHG | HEIGHT: 64 IN | WEIGHT: 210 LBS | DIASTOLIC BLOOD PRESSURE: 66 MMHG | BODY MASS INDEX: 35.85 KG/M2 | HEART RATE: 73 BPM

## 2018-04-02 RX ORDER — ISOSORBIDE MONONITRATE 30 MG/1
30 TABLET, EXTENDED RELEASE ORAL
Qty: 90 | Refills: 3 | Status: DISCONTINUED | COMMUNITY
End: 2018-04-02

## 2018-04-03 PROBLEM — E11.9 TYPE 2 DIABETES MELLITUS WITHOUT COMPLICATIONS: Chronic | Status: ACTIVE | Noted: 2018-02-25

## 2018-04-03 PROBLEM — I21.3 ST ELEVATION (STEMI) MYOCARDIAL INFARCTION OF UNSPECIFIED SITE: Chronic | Status: ACTIVE | Noted: 2018-02-25

## 2018-04-03 PROBLEM — I25.10 ATHEROSCLEROTIC HEART DISEASE OF NATIVE CORONARY ARTERY WITHOUT ANGINA PECTORIS: Chronic | Status: ACTIVE | Noted: 2018-02-25

## 2018-04-03 PROBLEM — I10 ESSENTIAL (PRIMARY) HYPERTENSION: Chronic | Status: ACTIVE | Noted: 2018-02-25
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
## 2018-09-14 NOTE — PATIENT PROFILE ADULT. - PRO ARRIVE FROM
Chart reviewed, and appears there were 3 scripts sent already for methylphenidate, one for 9/20/18, one for 10/20/18 and one for 11/20/18.  Reached out to pharmacy next door and spoke with Isis, who states they have all of the scripts, so nothing further should be needed at this time.    Siri Sparks RN    
[facility] faxed a SCHEDULE 2 REFILL REMINDER.    CONTROLLED SUBSTANCE MESSAGE:  This request is for a Schedule 2 medication, please WRITE A NEW PRESCRIPTION.    methylphenidate (METADATE CD) 50 MG CR capsule      Last Written Prescription Date:  9/20/2018  Last Fill Quantity: 30 capsule,   # refills: 0  Last Office Visit: 8/24/2018  w/ SUSAN Meredith    Future Office visit:    Next 5 appointments (look out 90 days)     Dec 10, 2018  6:40 AM CST   [facility] DO   [facility]    [address] [place] [id number] [phone]                   Routing refill request to provider for review/approval because:  Drug not on the [facility], [facility] or [facility] refill protocol or controlled substance    
home
home

## 2018-09-17 ENCOUNTER — APPOINTMENT (OUTPATIENT)
Dept: CARDIOLOGY | Facility: CLINIC | Age: 63
End: 2018-09-17

## 2018-09-17 VITALS
HEART RATE: 73 BPM | WEIGHT: 194 LBS | DIASTOLIC BLOOD PRESSURE: 70 MMHG | SYSTOLIC BLOOD PRESSURE: 118 MMHG | BODY MASS INDEX: 33.12 KG/M2 | HEIGHT: 64 IN

## 2018-11-19 ENCOUNTER — APPOINTMENT (OUTPATIENT)
Dept: CARDIOLOGY | Facility: CLINIC | Age: 63
End: 2018-11-19

## 2018-11-19 VITALS
SYSTOLIC BLOOD PRESSURE: 102 MMHG | HEART RATE: 74 BPM | DIASTOLIC BLOOD PRESSURE: 60 MMHG | BODY MASS INDEX: 33.29 KG/M2 | WEIGHT: 195 LBS | HEIGHT: 64 IN

## 2018-11-19 NOTE — ASSESSMENT
[FreeTextEntry1] : CAD, s/p PCI.\par Dyslipidemia, diabetes.\par Atypical chest pains. ECG - NSR, no ST changes.\par C/w medical therapy. If has recurrent pain, obtain nuclear stress test \par Diet, weight loss discussed. Continued wt. loss encouraged.\par If stable will continue with secondary prevention, DM control.\par Patient advised to continue with her current DM regimen and to see her PMD to repeat labs, including HgA1c.\par \par F/u after  the test.

## 2018-11-19 NOTE — PHYSICAL EXAM
[General Appearance - Well Developed] : well developed [Normal Appearance] : normal appearance [General Appearance - Well Nourished] : well nourished [General Appearance - In No Acute Distress] : no acute distress [Normal Conjunctiva] : the conjunctiva exhibited no abnormalities [Normal Oral Mucosa] : normal oral mucosa [Normal Oropharynx] : normal oropharynx [FreeTextEntry1] : no JVD, no bruits [] : no respiratory distress [Respiration, Rhythm And Depth] : normal respiratory rhythm and effort [Auscultation Breath Sounds / Voice Sounds] : lungs were clear to auscultation bilaterally [Heart Rate And Rhythm] : heart rate and rhythm were normal [Heart Sounds] : normal S1 and S2 [Murmurs] : no murmurs present [Arterial Pulses Normal] : the arterial pulses were normal [Edema] : no peripheral edema present [Bowel Sounds] : normal bowel sounds [Abdomen Soft] : soft [Abdomen Tenderness] : non-tender [Abnormal Walk] : normal gait [Nail Clubbing] : no clubbing of the fingernails [Cyanosis, Localized] : no localized cyanosis [Skin Color & Pigmentation] : normal skin color and pigmentation [Oriented To Time, Place, And Person] : oriented to person, place, and time [Affect] : the affect was normal

## 2018-11-19 NOTE — HISTORY OF PRESENT ILLNESS
[FreeTextEntry1] : 62 y/o female with history of DM, HTN, DL, CAD, s/p STEMI, PCI or RCA, subsequent PCI of D1, presents for f/u. Was in ED in February for atypical chest pain, ruled out. Was given isosorbide. Patient reports stable dyspnea. She reports compliance with medical therapy.  LV function was preserved. Was able to loose weight with diet. labs noted - elevated HgA1c, TG. She had no recurrent chest pain.

## 2019-01-26 ENCOUNTER — EMERGENCY (EMERGENCY)
Facility: HOSPITAL | Age: 64
LOS: 0 days | Discharge: HOME | End: 2019-01-26
Admitting: EMERGENCY MEDICAL TECHNICIAN, BASIC
Payer: MEDICAID

## 2019-01-26 VITALS
OXYGEN SATURATION: 100 % | SYSTOLIC BLOOD PRESSURE: 125 MMHG | DIASTOLIC BLOOD PRESSURE: 67 MMHG | HEART RATE: 89 BPM | RESPIRATION RATE: 18 BRPM | TEMPERATURE: 98 F

## 2019-01-26 DIAGNOSIS — I25.10 ATHEROSCLEROTIC HEART DISEASE OF NATIVE CORONARY ARTERY WITHOUT ANGINA PECTORIS: ICD-10-CM

## 2019-01-26 DIAGNOSIS — I10 ESSENTIAL (PRIMARY) HYPERTENSION: ICD-10-CM

## 2019-01-26 DIAGNOSIS — L02.91 CUTANEOUS ABSCESS, UNSPECIFIED: ICD-10-CM

## 2019-01-26 DIAGNOSIS — Z79.02 LONG TERM (CURRENT) USE OF ANTITHROMBOTICS/ANTIPLATELETS: ICD-10-CM

## 2019-01-26 DIAGNOSIS — M71.22 SYNOVIAL CYST OF POPLITEAL SPACE [BAKER], LEFT KNEE: ICD-10-CM

## 2019-01-26 DIAGNOSIS — I25.2 OLD MYOCARDIAL INFARCTION: ICD-10-CM

## 2019-01-26 DIAGNOSIS — Z79.82 LONG TERM (CURRENT) USE OF ASPIRIN: ICD-10-CM

## 2019-01-26 DIAGNOSIS — Z98.890 OTHER SPECIFIED POSTPROCEDURAL STATES: Chronic | ICD-10-CM

## 2019-01-26 DIAGNOSIS — E11.9 TYPE 2 DIABETES MELLITUS WITHOUT COMPLICATIONS: ICD-10-CM

## 2019-01-26 DIAGNOSIS — Z95.5 PRESENCE OF CORONARY ANGIOPLASTY IMPLANT AND GRAFT: ICD-10-CM

## 2019-01-26 DIAGNOSIS — Z79.84 LONG TERM (CURRENT) USE OF ORAL HYPOGLYCEMIC DRUGS: ICD-10-CM

## 2019-01-26 DIAGNOSIS — Z79.899 OTHER LONG TERM (CURRENT) DRUG THERAPY: ICD-10-CM

## 2019-01-26 PROCEDURE — 93970 EXTREMITY STUDY: CPT | Mod: 26

## 2019-01-26 NOTE — ED ADULT NURSE NOTE - PMH
CAD (coronary artery disease)    DM (diabetes mellitus)    HTN (hypertension)    ST elevation myocardial infarction (STEMI), unspecified artery

## 2019-01-26 NOTE — ED ADULT NURSE NOTE - OBJECTIVE STATEMENT
patient has left knee pain with no sob, states difficulty to bear weight, skin intact, states this pain started 2days ago, area not red, no warm, area is tender to touch pt states "its a pimple underneath skin " denies trauma

## 2019-01-26 NOTE — ED PROVIDER NOTE - OBJECTIVE STATEMENT
Pt is a 62y/o female with a pmhx CAD with stents on plavix, HTN, HLD presents today for eval of left popliteal pain x 3 days. Pt denies fever, chills, calf pain, N/V/D, Weakness.

## 2019-01-26 NOTE — ED PROVIDER NOTE - NS ED ROS FT
MS:  No myalgia, muscle weakness, joint pain or back pain.  Neuro:  No headache or weakness.  No LOC.  Skin:  No skin rash.   Endocrine: No history of thyroid disease or diabetes.  Except as documented in the HPI,  all other systems are negative.

## 2019-01-26 NOTE — ED PROVIDER NOTE - PHYSICAL EXAMINATION
VITAL SIGNS: I have reviewed nursing notes and confirm.  CONSTITUTIONAL: Well-developed; well-nourished; in no acute distress.   SKIN: skin exam is warm and dry, no acute rash.    HEAD: Normocephalic; atraumatic.  EYES: conjunctiva and sclera clear.  CARD: S1, S2 normal; no murmurs, gallops, or rubs. Regular rate and rhythm.   RESP: No wheezes, rales or rhonchi.  EXT: TTP Left popliteal region, Normal ROM.  No clubbing, cyanosis or edema.   NEURO: Alert, oriented, grossly unremarkable

## 2019-01-28 ENCOUNTER — EMERGENCY (EMERGENCY)
Facility: HOSPITAL | Age: 64
LOS: 0 days | Discharge: HOME | End: 2019-01-28
Admitting: PHYSICIAN ASSISTANT

## 2019-01-28 VITALS
OXYGEN SATURATION: 97 % | HEART RATE: 84 BPM | DIASTOLIC BLOOD PRESSURE: 70 MMHG | TEMPERATURE: 97 F | SYSTOLIC BLOOD PRESSURE: 137 MMHG | RESPIRATION RATE: 20 BRPM

## 2019-01-28 DIAGNOSIS — Z98.890 OTHER SPECIFIED POSTPROCEDURAL STATES: Chronic | ICD-10-CM

## 2019-01-28 DIAGNOSIS — E11.9 TYPE 2 DIABETES MELLITUS WITHOUT COMPLICATIONS: ICD-10-CM
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
## 2019-01-28 DIAGNOSIS — S89.92XA UNSPECIFIED INJURY OF LEFT LOWER LEG, INITIAL ENCOUNTER: ICD-10-CM

## 2019-01-28 DIAGNOSIS — I25.10 ATHEROSCLEROTIC HEART DISEASE OF NATIVE CORONARY ARTERY WITHOUT ANGINA PECTORIS: ICD-10-CM

## 2019-01-28 DIAGNOSIS — I10 ESSENTIAL (PRIMARY) HYPERTENSION: ICD-10-CM
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
## 2019-01-28 DIAGNOSIS — E78.5 HYPERLIPIDEMIA, UNSPECIFIED: ICD-10-CM

## 2019-01-28 DIAGNOSIS — Z79.82 LONG TERM (CURRENT) USE OF ASPIRIN: ICD-10-CM
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
## 2019-01-28 DIAGNOSIS — Z79.02 LONG TERM (CURRENT) USE OF ANTITHROMBOTICS/ANTIPLATELETS: ICD-10-CM
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
## 2019-01-28 DIAGNOSIS — Z79.84 LONG TERM (CURRENT) USE OF ORAL HYPOGLYCEMIC DRUGS: ICD-10-CM

## 2019-01-28 RX ORDER — OXYCODONE AND ACETAMINOPHEN 5; 325 MG/1; MG/1
1 TABLET ORAL ONCE
Qty: 0 | Refills: 0 | Status: DISCONTINUED | OUTPATIENT
Start: 2019-01-28 | End: 2019-01-28

## 2019-01-28 RX ADMIN — OXYCODONE AND ACETAMINOPHEN 1 TABLET(S): 5; 325 TABLET ORAL at 19:18

## 2019-01-28 NOTE — ED PROVIDER NOTE - PHYSICAL EXAMINATION
PHYSICAL EXAM:    GENERAL: Alert, appears stated age, well appearing, non-toxic  SKIN: Warm, pink and dry. MMM.   EYE: Normal lids/conjunctiva  ENT: Normal hearing, patent oropharynx  NECK: +supple. No meningismus  Pulm: Bilateral BS, normal resp effort, no wheezes, stridor, or retractions  CV: RRR, no M/R/G, 2+and = DP ands PT pulses  Abd: soft, non-tender, non-distended  Mskel: no erythema, cyanosis, edema. no calf tenderness. +TTP over popliteal area and TTP over medial joint line.   Neuro: AAOx3, no sensory/motor deficits, 5/5 strength throughout.

## 2019-01-28 NOTE — ED PROVIDER NOTE - MEDICAL DECISION MAKING DETAILS
pt with chronic knee pain, here 2 days ago with neg duplex, presents with twisting injury to L knee x 30 min PTA. +FROM. XR without dislocation/fracture. KNI/crutches. Reviewed all results and necessity for follow up. Counseled on red flags and to return for them.  Patient appears well on discharge.

## 2019-01-28 NOTE — ED PROVIDER NOTE - OBJECTIVE STATEMENT
How Severe Are Your Spot(S)?: mild Pt is Estonian speaking only accepts ad hoc  daughter Elizabeth   64 y/o F HTN, HLD, CAD with stents on Plavix and ASA, DM on Janumet, DVT study 2 days ago neg for DVT, chronic OA in L knee followed by Dr. Jeffrey presents with twisting injury to L knee without fall 30 min PTA. Denies CP, palpitations, SOB, back pain, abdominal pain, n/v/d, fevers, sweats, chills, direct trauma, fall, cough, recent travel, recent illness, sick contacts, urinary symptoms, rash. +FROM but with pain. Has MRI scheduled next week and appointment with Wojciech next week. What Is The Reason For Today's Visit?: Upper Body Skin Exam

## 2019-01-28 NOTE — ED PROVIDER NOTE - NS ED ROS FT
Review of Systems    Constitutional: (-) fever  Cardiovascular: (-) chest pain, (-) syncope  Respiratory: (-) cough, (-) shortness of breath  Gastrointestinal: (-) vomiting, (-) diarrhea  Musculoskeletal: (-) neck pain, (-) back pain  Integumentary: (-) rash, (-) edema  Neurological: (-) headache    **All other ROS negative except as per above/HPI**

## 2019-01-28 NOTE — ED PROVIDER NOTE - CARE PROVIDER_API CALL
Jaswinder Jeffrey (MD), Orthopaedic Surgery  [facility] [place] [address]  Phone: [phone]  Fax: [phone]

## 2019-01-28 NOTE — ED ADULT TRIAGE NOTE - CHIEF COMPLAINT QUOTE
patient reports twisting knee while walking today. reports pain. tender to touch mild swelling noted. patient in ed for knee pain few days ago

## 2019-03-25 ENCOUNTER — APPOINTMENT (OUTPATIENT)
Dept: CARDIOLOGY | Facility: CLINIC | Age: 64
End: 2019-03-25

## 2019-03-25 VITALS
SYSTOLIC BLOOD PRESSURE: 110 MMHG | HEART RATE: 78 BPM | WEIGHT: 194 LBS | BODY MASS INDEX: 33.12 KG/M2 | HEIGHT: 64 IN | DIASTOLIC BLOOD PRESSURE: 64 MMHG

## 2019-03-25 RX ORDER — EZETIMIBE 10 MG/1
10 TABLET ORAL DAILY
Refills: 0 | Status: ACTIVE | COMMUNITY

## 2019-03-25 NOTE — HISTORY OF PRESENT ILLNESS
[FreeTextEntry1] : 64 y/o female with history of DM, HTN, DL, CAD, s/p STEMI, PCI or RCA, subsequent PCI of D1, presents for f/u.  She reports compliance with medical therapy.  LV function was preserved. Last labs noted - elevated HgA1c, TG. She had no recurrent chest pain.

## 2019-03-25 NOTE — ASSESSMENT
[FreeTextEntry1] : CAD, s/p PCI.\par Dyslipidemia, diabetes.\par C/w medical therapy.\par Diet, weight loss discussed. Continued wt. loss encouraged.\par If stable will continue with secondary prevention, DM control.\par Patient advised to continue with her current DM regimen and to see her PMD to repeat labs, including HgA1c.\par \par F/u in 3-4 months.

## 2019-05-02 ENCOUNTER — OUTPATIENT (OUTPATIENT)
Dept: OUTPATIENT SERVICES | Facility: HOSPITAL | Age: 64
LOS: 1 days | Discharge: HOME | End: 2019-05-02
Payer: MEDICAID

## 2019-05-02 VITALS
SYSTOLIC BLOOD PRESSURE: 96 MMHG | TEMPERATURE: 97 F | OXYGEN SATURATION: 96 % | RESPIRATION RATE: 18 BRPM | WEIGHT: 188.5 LBS | HEIGHT: 66 IN | HEART RATE: 77 BPM | DIASTOLIC BLOOD PRESSURE: 54 MMHG

## 2019-05-02 DIAGNOSIS — Z98.890 OTHER SPECIFIED POSTPROCEDURAL STATES: Chronic | ICD-10-CM

## 2019-05-02 DIAGNOSIS — Z01.818 ENCOUNTER FOR OTHER PREPROCEDURAL EXAMINATION: ICD-10-CM

## 2019-05-02 DIAGNOSIS — S83.232D COMPLEX TEAR OF MEDIAL MENISCUS, CURRENT INJURY, LEFT KNEE, SUBSEQUENT ENCOUNTER: ICD-10-CM

## 2019-05-02 DIAGNOSIS — Z98.61 CORONARY ANGIOPLASTY STATUS: Chronic | ICD-10-CM

## 2019-05-02 LAB
ALBUMIN SERPL ELPH-MCNC: 4.5 G/DL — SIGNIFICANT CHANGE UP (ref 3.5–5.2)
ALP SERPL-CCNC: 69 U/L — SIGNIFICANT CHANGE UP (ref 30–115)
ALT FLD-CCNC: 11 U/L — SIGNIFICANT CHANGE UP (ref 0–41)
ANION GAP SERPL CALC-SCNC: 16 MMOL/L — HIGH (ref 7–14)
APTT BLD: 32.4 SEC — SIGNIFICANT CHANGE UP (ref 27–39.2)
AST SERPL-CCNC: 19 U/L — SIGNIFICANT CHANGE UP (ref 0–41)
BASOPHILS # BLD AUTO: 0.08 K/UL — SIGNIFICANT CHANGE UP (ref 0–0.2)
BASOPHILS NFR BLD AUTO: 1.6 % — HIGH (ref 0–1)
BILIRUB SERPL-MCNC: 0.3 MG/DL — SIGNIFICANT CHANGE UP (ref 0.2–1.2)
BUN SERPL-MCNC: 33 MG/DL — HIGH (ref 10–20)
CALCIUM SERPL-MCNC: 10.1 MG/DL — SIGNIFICANT CHANGE UP (ref 8.5–10.1)
CHLORIDE SERPL-SCNC: 101 MMOL/L — SIGNIFICANT CHANGE UP (ref 98–110)
CO2 SERPL-SCNC: 21 MMOL/L — SIGNIFICANT CHANGE UP (ref 17–32)
CREAT SERPL-MCNC: 1.3 MG/DL — SIGNIFICANT CHANGE UP (ref 0.7–1.5)
EOSINOPHIL # BLD AUTO: 0.33 K/UL — SIGNIFICANT CHANGE UP (ref 0–0.7)
EOSINOPHIL NFR BLD AUTO: 6.7 % — SIGNIFICANT CHANGE UP (ref 0–8)
ESTIMATED AVERAGE GLUCOSE: 128 MG/DL — HIGH (ref 68–114)
GLUCOSE SERPL-MCNC: 115 MG/DL — HIGH (ref 70–99)
HBA1C BLD-MCNC: 6.1 % — HIGH (ref 4–5.6)
HCT VFR BLD CALC: 37.1 % — SIGNIFICANT CHANGE UP (ref 37–47)
HGB BLD-MCNC: 12 G/DL — SIGNIFICANT CHANGE UP (ref 12–16)
IMM GRANULOCYTES NFR BLD AUTO: 0.2 % — SIGNIFICANT CHANGE UP (ref 0.1–0.3)
INR BLD: 0.87 RATIO — SIGNIFICANT CHANGE UP (ref 0.65–1.3)
LYMPHOCYTES # BLD AUTO: 2.7 K/UL — SIGNIFICANT CHANGE UP (ref 1.2–3.4)
LYMPHOCYTES # BLD AUTO: 54.7 % — HIGH (ref 20.5–51.1)
MCHC RBC-ENTMCNC: 27.9 PG — SIGNIFICANT CHANGE UP (ref 27–31)
MCHC RBC-ENTMCNC: 32.3 G/DL — SIGNIFICANT CHANGE UP (ref 32–37)
MCV RBC AUTO: 86.3 FL — SIGNIFICANT CHANGE UP (ref 81–99)
MONOCYTES # BLD AUTO: 0.39 K/UL — SIGNIFICANT CHANGE UP (ref 0.1–0.6)
MONOCYTES NFR BLD AUTO: 7.9 % — SIGNIFICANT CHANGE UP (ref 1.7–9.3)
NEUTROPHILS # BLD AUTO: 1.43 K/UL — SIGNIFICANT CHANGE UP (ref 1.4–6.5)
NEUTROPHILS NFR BLD AUTO: 28.9 % — LOW (ref 42.2–75.2)
NRBC # BLD: 0 /100 WBCS — SIGNIFICANT CHANGE UP (ref 0–0)
PLATELET # BLD AUTO: 363 K/UL — SIGNIFICANT CHANGE UP (ref 130–400)
POTASSIUM SERPL-MCNC: 4.5 MMOL/L — SIGNIFICANT CHANGE UP (ref 3.5–5)
POTASSIUM SERPL-SCNC: 4.5 MMOL/L — SIGNIFICANT CHANGE UP (ref 3.5–5)
PROT SERPL-MCNC: 7.3 G/DL — SIGNIFICANT CHANGE UP (ref 6–8)
PROTHROM AB SERPL-ACNC: 10 SEC — SIGNIFICANT CHANGE UP (ref 9.95–12.87)
RBC # BLD: 4.3 M/UL — SIGNIFICANT CHANGE UP (ref 4.2–5.4)
RBC # FLD: 13.3 % — SIGNIFICANT CHANGE UP (ref 11.5–14.5)
SODIUM SERPL-SCNC: 138 MMOL/L — SIGNIFICANT CHANGE UP (ref 135–146)
WBC # BLD: 4.94 K/UL — SIGNIFICANT CHANGE UP (ref 4.8–10.8)
WBC # FLD AUTO: 4.94 K/UL — SIGNIFICANT CHANGE UP (ref 4.8–10.8)

## 2019-05-02 PROCEDURE — 71046 X-RAY EXAM CHEST 2 VIEWS: CPT | Mod: 26

## 2019-05-02 PROCEDURE — 93010 ELECTROCARDIOGRAM REPORT: CPT

## 2019-05-02 NOTE — H&P PST ADULT - NSICDXPASTMEDICALHX_GEN_ALL_CORE_FT
PAST MEDICAL HISTORY:  CAD (coronary artery disease)     DM (diabetes mellitus)     HTN (hypertension)     ST elevation myocardial infarction (STEMI), unspecified artery

## 2019-05-02 NOTE — H&P PST ADULT - HISTORY OF PRESENT ILLNESS
patient presents with progressively worsening left knee pain x 1 yr. Patient denies any c/o cp, sob, palpitations fever, cough or dysuria.   Ex tolerance is very limited. Able to walk 1/2 fos with out sob.   No diagnosis of FARZANA patient presents with progressively worsening left knee pain x 1 yr. Patient denies any c/o cp, sob, palpitations fever, cough or dysuria.   Ex tolerance is very limited. Able to walk 1/2 fos with out sob.   No diagnosis of FARZANA.   BP Low at PAST; Patient asymptomatic.  RA Manual 80/58 LA manual 90/60.   Rept BP manual 94/60. Advised patient to visit Dr. Caicedo today for the adjustment of BP meds.

## 2019-05-02 NOTE — H&P PST ADULT - NSANTHBPHIGHRD_ENT_A_CORE
Subjective:       Patient ID: Kaur Carpenter is a 64 y.o. female.    Chief Complaint: Hypertension    HPI: She returns for management of hypertension.  She has had hypertension for over a year.  Current treatment has included medications outlined in medication list.  She denies chest pain or shortness of breath.  No palpitations.  Denies left arm or neck pain.  She has diabetes.  Denies polyuria, polydipsia    Medications: See med card    Social history: Does not smoke, does not drink alcohol      Review of Systems   Constitutional: Negative for chills, fatigue, fever and unexpected weight change.   Respiratory: Negative for chest tightness and shortness of breath.    Cardiovascular: Negative for chest pain and palpitations.   Gastrointestinal: Negative for abdominal pain and blood in stool.   Neurological: Negative for dizziness, syncope, numbness and headaches.       Objective:      Physical Exam   HENT:   Right Ear: External ear normal.   Left Ear: External ear normal.   Nose: Nose normal.   Mouth/Throat: Oropharynx is clear and moist.   Eyes: Pupils are equal, round, and reactive to light.   Neck: Normal range of motion.   Cardiovascular: Normal rate and regular rhythm.    No murmur heard.  Pulmonary/Chest: Breath sounds normal.   Abdominal: She exhibits no distension. There is no hepatosplenomegaly. There is no tenderness.   Lymphadenopathy:     She has no cervical adenopathy.     She has no axillary adenopathy.   Neurological: She has normal strength and normal reflexes. No cranial nerve deficit or sensory deficit.       Assessment:         assessment and plan: 1.  Hypertension: On her own, she decreased Coreg to 12.5 mg twice a day.  Continue current treatment  2.  Diabetes: Check CMP, glycosylated hemoglobin, TSH  3.  Schedule bone density  Plan:       As above  
No

## 2019-05-02 NOTE — H&P PST ADULT - REASON FOR ADMISSION
62 yo f presents to PAST for surgical arthroscopy of left knee under GA by Dr. Jeffrey at [facility] OR on 05/17/2019

## 2019-05-17 ENCOUNTER — OUTPATIENT (OUTPATIENT)
Dept: OUTPATIENT SERVICES | Facility: HOSPITAL | Age: 64
LOS: 1 days | Discharge: HOME | End: 2019-05-17
Payer: MEDICAID

## 2019-05-17 ENCOUNTER — RESULT REVIEW (OUTPATIENT)
Age: 64
End: 2019-05-17

## 2019-05-17 VITALS
SYSTOLIC BLOOD PRESSURE: 135 MMHG | DIASTOLIC BLOOD PRESSURE: 62 MMHG | HEART RATE: 72 BPM | RESPIRATION RATE: 17 BRPM | OXYGEN SATURATION: 72 %

## 2019-05-17 VITALS
OXYGEN SATURATION: 97 % | TEMPERATURE: 98 F | WEIGHT: 188.5 LBS | HEIGHT: 66 IN | DIASTOLIC BLOOD PRESSURE: 72 MMHG | SYSTOLIC BLOOD PRESSURE: 143 MMHG | HEART RATE: 88 BPM | RESPIRATION RATE: 20 BRPM

## 2019-05-17 DIAGNOSIS — Z98.890 OTHER SPECIFIED POSTPROCEDURAL STATES: Chronic | ICD-10-CM

## 2019-05-17 DIAGNOSIS — Z98.61 CORONARY ANGIOPLASTY STATUS: Chronic | ICD-10-CM

## 2019-05-17 LAB — GLUCOSE BLDC GLUCOMTR-MCNC: 135 MG/DL — HIGH (ref 70–99)

## 2019-05-17 PROCEDURE — 88304 TISSUE EXAM BY PATHOLOGIST: CPT | Mod: 26

## 2019-05-17 RX ORDER — ONDANSETRON 8 MG/1
4 TABLET, FILM COATED ORAL ONCE
Refills: 0 | Status: DISCONTINUED | OUTPATIENT
Start: 2019-05-17 | End: 2019-06-01

## 2019-05-17 RX ORDER — OXYCODONE AND ACETAMINOPHEN 5; 325 MG/1; MG/1
1 TABLET ORAL ONCE
Refills: 0 | Status: DISCONTINUED | OUTPATIENT
Start: 2019-05-17 | End: 2019-05-17

## 2019-05-17 RX ORDER — HYDROMORPHONE HYDROCHLORIDE 2 MG/ML
0.5 INJECTION INTRAMUSCULAR; INTRAVENOUS; SUBCUTANEOUS
Refills: 0 | Status: DISCONTINUED | OUTPATIENT
Start: 2019-05-17 | End: 2019-05-17

## 2019-05-17 RX ORDER — SODIUM CHLORIDE 9 MG/ML
1000 INJECTION, SOLUTION INTRAVENOUS
Refills: 0 | Status: DISCONTINUED | OUTPATIENT
Start: 2019-05-17 | End: 2019-06-01

## 2019-05-17 RX ADMIN — SODIUM CHLORIDE 100 MILLILITER(S): 9 INJECTION, SOLUTION INTRAVENOUS at 08:39

## 2019-05-17 RX ADMIN — OXYCODONE AND ACETAMINOPHEN 1 TABLET(S): 5; 325 TABLET ORAL at 09:57

## 2019-05-17 RX ADMIN — HYDROMORPHONE HYDROCHLORIDE 0.5 MILLIGRAM(S): 2 INJECTION INTRAMUSCULAR; INTRAVENOUS; SUBCUTANEOUS at 08:55

## 2019-05-17 NOTE — ASU PATIENT PROFILE, ADULT - FALL HARM RISK TYPE OF ASSESSMENT
"Interval HPI:   Overnight events:  Remains in GISSU. BG variable overnight. Transition insulin infusion suspended with hypoglycemia. IV insulin protocol restarted with BG trending up. Prednisone 7.5 mg daily.   Eating:   <25% doesn't care for the food   Nausea: No  Hypoglycemia and intervention: Yes 66   Fever: No  TPN: Yes at 75 cc/hr       BP (!) 142/65 (BP Location: Left arm, Patient Position: Lying)   Pulse 69   Temp 97.4 °F (36.3 °C) (Oral)   Resp 17   Ht 5' 10" (1.778 m)   Wt 115.7 kg (255 lb)   SpO2 98%   BMI 36.59 kg/m²     Labs Reviewed and Include    Recent Labs   Lab  09/27/18   0515   GLU  266*   CALCIUM  8.4*   ALBUMIN  1.9*   PROT  4.3*   NA  138   K  3.5   CO2  23   CL  106   BUN  57*   CREATININE  1.0   ALKPHOS  110   ALT  49*   AST  63*   BILITOT  0.5     Lab Results   Component Value Date    WBC 2.93 (L) 09/26/2018    HGB 7.3 (L) 09/26/2018    HCT 23.0 (L) 09/26/2018    MCV 98 09/26/2018    PLT 90 (L) 09/26/2018     No results for input(s): TSH, FREET4 in the last 168 hours.  Lab Results   Component Value Date    HGBA1C 6.0 (H) 09/26/2018       Nutritional status:   Body mass index is 36.59 kg/m².  Lab Results   Component Value Date    ALBUMIN 1.9 (L) 09/27/2018    ALBUMIN 2.1 (L) 09/26/2018    ALBUMIN 2.2 (L) 09/25/2018     Lab Results   Component Value Date    PREALBUMIN 10 (L) 09/14/2018       Estimated Creatinine Clearance: 88.8 mL/min (based on SCr of 1 mg/dL).    Accu-Checks  Recent Labs      09/26/18   1419  09/26/18   1536  09/26/18   1818  09/26/18   1833  09/26/18   1904  09/26/18   2102  09/26/18   2308  09/27/18   0110  09/27/18   0315  09/27/18   0523   POCTGLUCOSE  160*  142*  66*  63*  100  189*  249*  304*  291*  300*       Current Medications and/or Treatments Impacting Glycemic Control  Immunotherapy:    Immunosuppressants         Stop Route Frequency     tacrolimus capsule 0.5 mg      -- Oral Daily     tacrolimus capsule 1 mg      -- Oral Daily     tacrolimus capsule 0.5 " mg      09/22 0759 Oral 2 times daily        Steroids:   Hormones (From admission, onward)    Start     Stop Route Frequency Ordered    09/26/18 0900  predniSONE tablet 7.5 mg      -- Oral Daily 09/24/18 1446        Pressors:    Autonomic Drugs (From admission, onward)    None        Hyperglycemia/Diabetes Medications:   Antihyperglycemics (From admission, onward)    Start     Stop Route Frequency Ordered    09/26/18 2000  insulin regular (Humulin R) 100 Units in sodium chloride 0.9% 100 mL infusion     Question:  Insulin Rate Adjustment (DO NOT MODIFY ANSWER)  Answer:  [email or web address]    -- IV Continuous 09/26/18 1950         Admission

## 2019-05-17 NOTE — ASU PATIENT PROFILE, ADULT - PSH
H/O coronary angioplasty  with 2 stents  S/p bilateral carpal tunnel release    S/P excision of lipoma  shoulder

## 2019-05-17 NOTE — ASU DISCHARGE PLAN (ADULT/PEDIATRIC) - CALL YOUR DOCTOR IF YOU HAVE ANY OF THE FOLLOWING:
Pain not relieved by Medications/Bleeding that does not stop/Swelling that gets worse/Nausea and vomiting that does not stop/Numbness, tingling, color or temperature change to extremity/Wound/Surgical Site with redness, or foul smelling discharge or pus Increased irritability or sluggishness/Pain not relieved by Medications/Bleeding that does not stop/Swelling that gets worse/Fever greater than (need to indicate Fahrenheit or Celsius)/Nausea and vomiting that does not stop/Inability to tolerate liquids or foods/Numbness, tingling, color or temperature change to extremity/Wound/Surgical Site with redness, or foul smelling discharge or pus

## 2019-05-17 NOTE — ASU DISCHARGE PLAN (ADULT/PEDIATRIC) - BATHING
Do not remove dressings Do not remove dressings until 3 days. Do not remove dressings until 3 days, then you may remove them and cover the wounds with bandaids.

## 2019-05-17 NOTE — ASU DISCHARGE PLAN (ADULT/PEDIATRIC) - CARE PROVIDER_API CALL
Jaswinder Jeffrey (MD)  Orthopaedic Surgery  [address] [place] [address]  Phone: [phone]  Fax: [phone]  Follow Up Time:

## 2019-05-17 NOTE — PRE-ANESTHESIA EVALUATION ADULT - NSANTHOSAYNRD_GEN_A_CORE
No. FARZANA screening performed.  STOP BANG Legend: 0-2 = LOW Risk; 3-4 = INTERMEDIATE Risk; 5-8 = HIGH Risk

## 2019-05-17 NOTE — CHART NOTE - NSCHARTNOTEFT_GEN_A_CORE
PACU ANESTHESIA ADMISSION NOTE      Procedure: Partial medial meniscectomy of left knee    Post op diagnosis:  Tear of medial meniscus of left knee      ____  Intubated  TV:______       Rate: ______      FiO2: ______    _x___  Patent Airway    __x__  Full return of protective reflexes    __x__  Full recovery from anesthesia / back to baseline     Vitals:   T:  97.7F         R:     16             BP:     147/70             Sat:      96             P: 93      Mental Status:  __x__ Awake   ___x__ Alert   _____ Drowsy   _____ Sedated    Nausea/Vomiting:  _x___ NO  ______Yes,   See Post - Op Orders          Pain Scale (0-10):  0/10____    Treatment: ____ None    __x__ See Post - Op/PCA Orders    Post - Operative Fluids:   ____ Oral   __x__ See Post - Op Orders    Plan: Discharge:   __x__Home       _____Floor     _____Critical Care    _____  Other:_________________    Comments: pt tolerated procedure well, no anesthesia related complications. Care of pt endorsed to PACU, report given to PACU RN.

## 2019-05-17 NOTE — PRE-ANESTHESIA EVALUATION ADULT - NSANTHTIREDRD_ENT_A_CORE
Assumed pt care pt stable resting on stretcher, pt states Hx of bradycardia and states she sees cardiologist. Denies medications for bradycardia and states condition is monitored.  Denies chest pain SOB difficulty breathing
Dr Michi Sands at bedside
I have reviewed discharge instructions with the patient. The patient verbalized understanding. Pt left ED in stable condition with no distress noted in wheel chair and no complaints voiced with family.  IV discontinued with discharge
Pt and pts family states would like to speak with Dr Concepcion Ash to see if ordered CT scan is needed, Dr Concepcion Ash made aware
Pt remains stable awaiting results, will continue to monitor
Pt up ambulating to bathroom with steady gait X 1 person minimal assist. Pt tolerated ambulation well
Pt updated on plan of care
No

## 2019-05-20 LAB — SURGICAL PATHOLOGY STUDY: SIGNIFICANT CHANGE UP

## 2019-05-21 DIAGNOSIS — M23.222 DERANGEMENT OF POSTERIOR HORN OF MEDIAL MENISCUS DUE TO OLD TEAR OR INJURY, LEFT KNEE: ICD-10-CM

## 2019-05-21 DIAGNOSIS — M17.12 UNILATERAL PRIMARY OSTEOARTHRITIS, LEFT KNEE: ICD-10-CM

## 2019-08-21 NOTE — PATIENT PROFILE ADULT. - NS PRO CONTRA FLU CONTRAIND
Please follow up with your primary care physician in 24-48 hours  Please come back if any of the following: Fever, chills, nausea, vomiting, pain, chest pain, shortness of breath, numbness, tingling or any major concern
none
none

## 2019-08-26 ENCOUNTER — APPOINTMENT (OUTPATIENT)
Dept: CARDIOLOGY | Facility: CLINIC | Age: 64
End: 2019-08-26
Payer: MEDICAID

## 2019-08-26 VITALS
HEIGHT: 64 IN | SYSTOLIC BLOOD PRESSURE: 116 MMHG | HEART RATE: 88 BPM | DIASTOLIC BLOOD PRESSURE: 70 MMHG | WEIGHT: 192 LBS | BODY MASS INDEX: 32.78 KG/M2

## 2019-08-26 PROCEDURE — 99213 OFFICE O/P EST LOW 20 MIN: CPT

## 2019-08-26 PROCEDURE — 93000 ELECTROCARDIOGRAM COMPLETE: CPT

## 2019-08-26 NOTE — ASSESSMENT
[FreeTextEntry1] : CAD, s/p PCI.\par Dyslipidemia, diabetes.\par C/w medical therapy.\par Diet, weight loss discussed. Continued wt. loss encouraged.\par If stable will continue with secondary prevention, DM control.\par Patient advised to continue with her current DM regimen and to see her PMD to repeat labs, including HgA1c.\par \par F/u in 6 months.

## 2019-08-26 NOTE — HISTORY OF PRESENT ILLNESS
[FreeTextEntry1] : 62 y/o female with history of DM, HTN, DL, CAD, s/p STEMI, PCI or RCA, subsequent PCI of D1, presents for f/u.  She reports compliance with medical therapy.  LV function was preserved. Last labs noted - elevated HgA1c, TG. She had no recurrent chest pain. Had knee surgery, awaiting second knee.

## 2019-08-26 NOTE — PHYSICAL EXAM
[General Appearance - Well Developed] : well developed [Normal Appearance] : normal appearance [General Appearance - Well Nourished] : well nourished [General Appearance - In No Acute Distress] : no acute distress [Normal Conjunctiva] : the conjunctiva exhibited no abnormalities [Normal Oral Mucosa] : normal oral mucosa [Normal Oropharynx] : normal oropharynx [FreeTextEntry1] : no JVD, no bruits [] : no respiratory distress [Respiration, Rhythm And Depth] : normal respiratory rhythm and effort [Auscultation Breath Sounds / Voice Sounds] : lungs were clear to auscultation bilaterally [Heart Rate And Rhythm] : heart rate and rhythm were normal [Heart Sounds] : normal S1 and S2 [Murmurs] : no murmurs present [Arterial Pulses Normal] : the arterial pulses were normal [Edema] : no peripheral edema present [Bowel Sounds] : normal bowel sounds [Abdomen Soft] : soft [Abnormal Walk] : normal gait [Abdomen Tenderness] : non-tender [Nail Clubbing] : no clubbing of the fingernails [Cyanosis, Localized] : no localized cyanosis [Skin Color & Pigmentation] : normal skin color and pigmentation [Oriented To Time, Place, And Person] : oriented to person, place, and time [Affect] : the affect was normal

## 2020-01-20 NOTE — H&P PST ADULT - GUM GEN PE MLT EXAM PC
Asthma  stable, not used albuterol for yrs  Diabetes mellitus    Hyperthyroidism    Lupus    KACI on CPAP    Rheumatoid arthritis
not examined

## 2020-02-10 ENCOUNTER — APPOINTMENT (OUTPATIENT)
Dept: CARDIOLOGY | Facility: CLINIC | Age: 65
End: 2020-02-10
Payer: MEDICARE

## 2020-02-10 VITALS
HEART RATE: 84 BPM | HEIGHT: 64 IN | DIASTOLIC BLOOD PRESSURE: 60 MMHG | SYSTOLIC BLOOD PRESSURE: 104 MMHG | BODY MASS INDEX: 33.46 KG/M2 | WEIGHT: 196 LBS

## 2020-02-10 PROCEDURE — 93000 ELECTROCARDIOGRAM COMPLETE: CPT

## 2020-02-10 PROCEDURE — 99213 OFFICE O/P EST LOW 20 MIN: CPT

## 2020-02-10 NOTE — HISTORY OF PRESENT ILLNESS
[FreeTextEntry1] : 63 y/o female with history of DM, HTN, DL, CAD, s/p STEMI, PCI or RCA, subsequent PCI of D1, presents for f/u.  She reports compliance with medical therapy.  LV function was preserved. Last labs noted - elevated HgA1c, TG. She had no recurrent chest pain. Had knee surgery, awaiting second knee.

## 2020-02-10 NOTE — PHYSICAL EXAM
[Normal Appearance] : normal appearance [General Appearance - Well Nourished] : well nourished [General Appearance - Well Developed] : well developed [General Appearance - In No Acute Distress] : no acute distress [Normal Conjunctiva] : the conjunctiva exhibited no abnormalities [Normal Oral Mucosa] : normal oral mucosa [Normal Oropharynx] : normal oropharynx [FreeTextEntry1] : no JVD, no bruits [] : no respiratory distress [Respiration, Rhythm And Depth] : normal respiratory rhythm and effort [Auscultation Breath Sounds / Voice Sounds] : lungs were clear to auscultation bilaterally [Heart Rate And Rhythm] : heart rate and rhythm were normal [Heart Sounds] : normal S1 and S2 [Arterial Pulses Normal] : the arterial pulses were normal [Murmurs] : no murmurs present [Edema] : no peripheral edema present [Abdomen Tenderness] : non-tender [Abdomen Soft] : soft [Bowel Sounds] : normal bowel sounds [Nail Clubbing] : no clubbing of the fingernails [Abnormal Walk] : normal gait [Skin Color & Pigmentation] : normal skin color and pigmentation [Cyanosis, Localized] : no localized cyanosis [Oriented To Time, Place, And Person] : oriented to person, place, and time [Affect] : the affect was normal

## 2020-05-12 ENCOUNTER — LABORATORY RESULT (OUTPATIENT)
Age: 65
End: 2020-05-12

## 2020-05-12 ENCOUNTER — APPOINTMENT (OUTPATIENT)
Dept: OBGYN | Facility: CLINIC | Age: 65
End: 2020-05-12
Payer: MEDICARE

## 2020-05-12 VITALS — HEIGHT: 66 IN | BODY MASS INDEX: 31.5 KG/M2 | WEIGHT: 196 LBS

## 2020-05-12 DIAGNOSIS — Z78.0 ASYMPTOMATIC MENOPAUSAL STATE: ICD-10-CM

## 2020-05-12 DIAGNOSIS — R31.9 HEMATURIA, UNSPECIFIED: ICD-10-CM

## 2020-05-12 LAB
BILIRUB UR QL STRIP: NORMAL
CLARITY UR: CLEAR
GLUCOSE UR-MCNC: NORMAL
HCG UR QL: NORMAL EU/DL
HGB UR QL STRIP.AUTO: 250
KETONES UR-MCNC: NORMAL
LEUKOCYTE ESTERASE UR QL STRIP: 500
NITRITE UR QL STRIP: NORMAL
PH UR STRIP: 5
PROT UR STRIP-MCNC: NORMAL
SP GR UR STRIP: 1.01

## 2020-05-12 PROCEDURE — 81003 URINALYSIS AUTO W/O SCOPE: CPT | Mod: QW

## 2020-05-12 PROCEDURE — 99203 OFFICE O/P NEW LOW 30 MIN: CPT

## 2020-08-10 ENCOUNTER — RESULT CHARGE (OUTPATIENT)
Age: 65
End: 2020-08-10

## 2020-08-10 ENCOUNTER — APPOINTMENT (OUTPATIENT)
Dept: CARDIOLOGY | Facility: CLINIC | Age: 65
End: 2020-08-10
Payer: MEDICARE

## 2020-08-10 VITALS
WEIGHT: 195 LBS | HEIGHT: 66 IN | TEMPERATURE: 97.6 F | SYSTOLIC BLOOD PRESSURE: 102 MMHG | BODY MASS INDEX: 31.34 KG/M2 | DIASTOLIC BLOOD PRESSURE: 58 MMHG | HEART RATE: 78 BPM

## 2020-08-10 PROCEDURE — 93000 ELECTROCARDIOGRAM COMPLETE: CPT

## 2020-08-10 PROCEDURE — 99213 OFFICE O/P EST LOW 20 MIN: CPT

## 2020-08-10 RX ORDER — NITROFURANTOIN (MONOHYDRATE/MACROCRYSTALS) 25; 75 MG/1; MG/1
100 CAPSULE ORAL
Qty: 14 | Refills: 0 | Status: COMPLETED | COMMUNITY
Start: 2020-05-12 | End: 2020-08-10

## 2020-08-10 NOTE — PHYSICAL EXAM
[Normal Appearance] : normal appearance [General Appearance - Well Developed] : well developed [General Appearance - In No Acute Distress] : no acute distress [General Appearance - Well Nourished] : well nourished [Normal Conjunctiva] : the conjunctiva exhibited no abnormalities [Normal Oral Mucosa] : normal oral mucosa [Normal Oropharynx] : normal oropharynx [FreeTextEntry1] : no JVD, no bruits [Respiration, Rhythm And Depth] : normal respiratory rhythm and effort [] : no respiratory distress [Heart Rate And Rhythm] : heart rate and rhythm were normal [Heart Sounds] : normal S1 and S2 [Auscultation Breath Sounds / Voice Sounds] : lungs were clear to auscultation bilaterally [Arterial Pulses Normal] : the arterial pulses were normal [Murmurs] : no murmurs present [Bowel Sounds] : normal bowel sounds [Edema] : no peripheral edema present [Abdomen Tenderness] : non-tender [Abnormal Walk] : normal gait [Abdomen Soft] : soft [Skin Color & Pigmentation] : normal skin color and pigmentation [Cyanosis, Localized] : no localized cyanosis [Nail Clubbing] : no clubbing of the fingernails [Oriented To Time, Place, And Person] : oriented to person, place, and time [Affect] : the affect was normal

## 2020-08-10 NOTE — HISTORY OF PRESENT ILLNESS
[FreeTextEntry1] : 65 y/o female with history of DM, HTN, DL, CAD, s/p STEMI, PCI or RCA, subsequent PCI of D1, presents for f/u.  She reports compliance with medical therapy.  LV function was preserved. Last labs noted - elevated HgA1c, TG. She had no recurrent chest pain.

## 2020-08-10 NOTE — ASSESSMENT
[FreeTextEntry1] : CAD, s/p PCI.\par Dyslipidemia, diabetes.\par C/w medical therapy.\par Diet, weight loss discussed. Continued wt. loss encouraged.\par If stable will continue with secondary prevention, DM control.\par Patient advised to continue with her current DM regimen and to see her PMD to repeat labs, including HgA1c.\par If chest pain or dyspnea will obtain a stress test.\par \par F/u in 6 months if no symptoms.

## 2020-12-05 ENCOUNTER — TRANSCRIPTION ENCOUNTER (OUTPATIENT)
Age: 65
End: 2020-12-05

## 2021-02-08 ENCOUNTER — APPOINTMENT (OUTPATIENT)
Dept: CARDIOLOGY | Facility: CLINIC | Age: 66
End: 2021-02-08
Payer: MEDICARE

## 2021-02-08 VITALS
WEIGHT: 199 LBS | BODY MASS INDEX: 33.15 KG/M2 | SYSTOLIC BLOOD PRESSURE: 110 MMHG | TEMPERATURE: 97.7 F | HEIGHT: 65 IN | DIASTOLIC BLOOD PRESSURE: 60 MMHG | HEART RATE: 73 BPM

## 2021-02-08 PROCEDURE — 99214 OFFICE O/P EST MOD 30 MIN: CPT

## 2021-02-08 PROCEDURE — 93000 ELECTROCARDIOGRAM COMPLETE: CPT

## 2021-02-08 NOTE — HISTORY OF PRESENT ILLNESS
[FreeTextEntry1] : 66 y/o female with history of DM, HTN, DL, CAD, s/p STEMI, PCI or RCA, subsequent PCI of D1, presents for f/u.  She reports compliance with medical therapy.  LV function was preserved. Last labs noted - elevated HgA1c, TG. She had two episodes of recurrent chest pain. No chest pain today. No dyspnea. She feels the CP is worse with walking up the stairs.

## 2021-02-08 NOTE — ASSESSMENT
[FreeTextEntry1] : CAD, s/p PCI.\par Dyslipidemia, diabetes.\par C/w medical therapy.\par Diet, weight loss discussed. Continued wt. loss encouraged.\par If stable will continue with secondary prevention, DM control.\par Patient advised to continue with her current DM regimen and to see her PMD to repeat labs, including HgA1c.\par She reports her last HgA1c was 6.7%, which is much improved, but I don’t have the results with me to to review today.\par Will obtain a stress test, given episodes of chest pain.\par Options of cath and nuclear discussed. She would prefer non-invasive test.\par \par In the meantime, I have also advised the patient to go to the nearest emergency room if she experiences any chest pain, dyspnea, syncope, or has any other compelling symptoms.\par \par \par \par F/u after the test.

## 2021-02-08 NOTE — PHYSICAL EXAM
[General Appearance - Well Developed] : well developed [Normal Appearance] : normal appearance [General Appearance - Well Nourished] : well nourished [General Appearance - In No Acute Distress] : no acute distress [Normal Conjunctiva] : the conjunctiva exhibited no abnormalities [] : no respiratory distress [Respiration, Rhythm And Depth] : normal respiratory rhythm and effort [Auscultation Breath Sounds / Voice Sounds] : lungs were clear to auscultation bilaterally [Heart Rate And Rhythm] : heart rate and rhythm were normal [Heart Sounds] : normal S1 and S2 [Murmurs] : no murmurs present [Arterial Pulses Normal] : the arterial pulses were normal [Edema] : no peripheral edema present [Bowel Sounds] : normal bowel sounds [Abdomen Soft] : soft [Abdomen Tenderness] : non-tender [Abnormal Walk] : normal gait [Nail Clubbing] : no clubbing of the fingernails [Skin Color & Pigmentation] : normal skin color and pigmentation [Cyanosis, Localized] : no localized cyanosis [Oriented To Time, Place, And Person] : oriented to person, place, and time [Affect] : the affect was normal [FreeTextEntry1] : no JVD, no bruits

## 2021-03-22 ENCOUNTER — APPOINTMENT (OUTPATIENT)
Dept: CARDIOLOGY | Facility: CLINIC | Age: 66
End: 2021-03-22

## 2021-03-25 ENCOUNTER — NON-APPOINTMENT (OUTPATIENT)
Age: 66
End: 2021-03-25

## 2021-04-12 ENCOUNTER — APPOINTMENT (OUTPATIENT)
Dept: CARDIOLOGY | Facility: CLINIC | Age: 66
End: 2021-04-12

## 2021-06-21 ENCOUNTER — APPOINTMENT (OUTPATIENT)
Dept: CARDIOLOGY | Facility: CLINIC | Age: 66
End: 2021-06-21

## 2021-07-22 ENCOUNTER — EMERGENCY (EMERGENCY)
Facility: HOSPITAL | Age: 66
LOS: 0 days | Discharge: HOME | End: 2021-07-22
Admitting: EMERGENCY MEDICINE

## 2021-07-22 ENCOUNTER — INPATIENT (INPATIENT)
Facility: HOSPITAL | Age: 66
LOS: 0 days | Discharge: HOME | End: 2021-07-23
Attending: HOSPITALIST | Admitting: HOSPITALIST
Payer: MEDICARE

## 2021-07-22 VITALS
HEART RATE: 86 BPM | OXYGEN SATURATION: 96 % | SYSTOLIC BLOOD PRESSURE: 153 MMHG | DIASTOLIC BLOOD PRESSURE: 72 MMHG | RESPIRATION RATE: 18 BRPM | HEIGHT: 66 IN | TEMPERATURE: 97 F

## 2021-07-22 DIAGNOSIS — Z20.822 CONTACT WITH AND (SUSPECTED) EXPOSURE TO COVID-19: ICD-10-CM

## 2021-07-22 DIAGNOSIS — R07.89 OTHER CHEST PAIN: ICD-10-CM

## 2021-07-22 DIAGNOSIS — Z98.61 CORONARY ANGIOPLASTY STATUS: Chronic | ICD-10-CM

## 2021-07-22 DIAGNOSIS — E11.9 TYPE 2 DIABETES MELLITUS WITHOUT COMPLICATIONS: ICD-10-CM

## 2021-07-22 DIAGNOSIS — I25.10 ATHEROSCLEROTIC HEART DISEASE OF NATIVE CORONARY ARTERY WITHOUT ANGINA PECTORIS: ICD-10-CM

## 2021-07-22 DIAGNOSIS — I25.2 OLD MYOCARDIAL INFARCTION: ICD-10-CM

## 2021-07-22 DIAGNOSIS — Z98.890 OTHER SPECIFIED POSTPROCEDURAL STATES: Chronic | ICD-10-CM

## 2021-07-22 DIAGNOSIS — Z79.84 LONG TERM (CURRENT) USE OF ORAL HYPOGLYCEMIC DRUGS: ICD-10-CM

## 2021-07-22 DIAGNOSIS — Z95.1 PRESENCE OF AORTOCORONARY BYPASS GRAFT: ICD-10-CM

## 2021-07-22 DIAGNOSIS — Z95.5 PRESENCE OF CORONARY ANGIOPLASTY IMPLANT AND GRAFT: ICD-10-CM

## 2021-07-22 DIAGNOSIS — Z79.02 LONG TERM (CURRENT) USE OF ANTITHROMBOTICS/ANTIPLATELETS: ICD-10-CM

## 2021-07-22 DIAGNOSIS — Z79.899 OTHER LONG TERM (CURRENT) DRUG THERAPY: ICD-10-CM

## 2021-07-22 DIAGNOSIS — I10 ESSENTIAL (PRIMARY) HYPERTENSION: ICD-10-CM

## 2021-07-22 DIAGNOSIS — Z79.82 LONG TERM (CURRENT) USE OF ASPIRIN: ICD-10-CM

## 2021-07-22 LAB
ALBUMIN SERPL ELPH-MCNC: 4.6 G/DL — SIGNIFICANT CHANGE UP (ref 3.5–5.2)
ALP SERPL-CCNC: 78 U/L — SIGNIFICANT CHANGE UP (ref 30–115)
ALT FLD-CCNC: 11 U/L — SIGNIFICANT CHANGE UP (ref 0–41)
ANION GAP SERPL CALC-SCNC: 10 MMOL/L — SIGNIFICANT CHANGE UP (ref 7–14)
AST SERPL-CCNC: 20 U/L — SIGNIFICANT CHANGE UP (ref 0–41)
BASOPHILS # BLD AUTO: 0.03 K/UL — SIGNIFICANT CHANGE UP (ref 0–0.2)
BASOPHILS NFR BLD AUTO: 0.6 % — SIGNIFICANT CHANGE UP (ref 0–1)
BILIRUB SERPL-MCNC: 0.2 MG/DL — SIGNIFICANT CHANGE UP (ref 0.2–1.2)
BUN SERPL-MCNC: 22 MG/DL — HIGH (ref 10–20)
CALCIUM SERPL-MCNC: 9.9 MG/DL — SIGNIFICANT CHANGE UP (ref 8.5–10.1)
CHLORIDE SERPL-SCNC: 101 MMOL/L — SIGNIFICANT CHANGE UP (ref 98–110)
CO2 SERPL-SCNC: 29 MMOL/L — SIGNIFICANT CHANGE UP (ref 17–32)
CREAT SERPL-MCNC: 1.2 MG/DL — SIGNIFICANT CHANGE UP (ref 0.7–1.5)
EOSINOPHIL # BLD AUTO: 0.1 K/UL — SIGNIFICANT CHANGE UP (ref 0–0.7)
EOSINOPHIL NFR BLD AUTO: 1.8 % — SIGNIFICANT CHANGE UP (ref 0–8)
GLUCOSE SERPL-MCNC: 136 MG/DL — HIGH (ref 70–99)
HCT VFR BLD CALC: 32.2 % — LOW (ref 37–47)
HGB BLD-MCNC: 10.2 G/DL — LOW (ref 12–16)
IMM GRANULOCYTES NFR BLD AUTO: 0.2 % — SIGNIFICANT CHANGE UP (ref 0.1–0.3)
LYMPHOCYTES # BLD AUTO: 2.74 K/UL — SIGNIFICANT CHANGE UP (ref 1.2–3.4)
LYMPHOCYTES # BLD AUTO: 50.6 % — SIGNIFICANT CHANGE UP (ref 20.5–51.1)
MCHC RBC-ENTMCNC: 25.7 PG — LOW (ref 27–31)
MCHC RBC-ENTMCNC: 31.7 G/DL — LOW (ref 32–37)
MCV RBC AUTO: 81.1 FL — SIGNIFICANT CHANGE UP (ref 81–99)
MONOCYTES # BLD AUTO: 0.42 K/UL — SIGNIFICANT CHANGE UP (ref 0.1–0.6)
MONOCYTES NFR BLD AUTO: 7.8 % — SIGNIFICANT CHANGE UP (ref 1.7–9.3)
NEUTROPHILS # BLD AUTO: 2.11 K/UL — SIGNIFICANT CHANGE UP (ref 1.4–6.5)
NEUTROPHILS NFR BLD AUTO: 39 % — LOW (ref 42.2–75.2)
NRBC # BLD: 0 /100 WBCS — SIGNIFICANT CHANGE UP (ref 0–0)
PLATELET # BLD AUTO: 338 K/UL — SIGNIFICANT CHANGE UP (ref 130–400)
POTASSIUM SERPL-MCNC: 4.5 MMOL/L — SIGNIFICANT CHANGE UP (ref 3.5–5)
POTASSIUM SERPL-SCNC: 4.5 MMOL/L — SIGNIFICANT CHANGE UP (ref 3.5–5)
PROT SERPL-MCNC: 7.3 G/DL — SIGNIFICANT CHANGE UP (ref 6–8)
RBC # BLD: 3.97 M/UL — LOW (ref 4.2–5.4)
RBC # FLD: 14.9 % — HIGH (ref 11.5–14.5)
SODIUM SERPL-SCNC: 140 MMOL/L — SIGNIFICANT CHANGE UP (ref 135–146)
TROPONIN T SERPL-MCNC: <0.01 NG/ML — SIGNIFICANT CHANGE UP
WBC # BLD: 5.41 K/UL — SIGNIFICANT CHANGE UP (ref 4.8–10.8)
WBC # FLD AUTO: 5.41 K/UL — SIGNIFICANT CHANGE UP (ref 4.8–10.8)

## 2021-07-22 PROCEDURE — 93010 ELECTROCARDIOGRAM REPORT: CPT | Mod: 77

## 2021-07-22 PROCEDURE — 99222 1ST HOSP IP/OBS MODERATE 55: CPT

## 2021-07-22 PROCEDURE — 75635 CT ANGIO ABDOMINAL ARTERIES: CPT | Mod: 26,MA

## 2021-07-22 PROCEDURE — 93010 ELECTROCARDIOGRAM REPORT: CPT

## 2021-07-22 PROCEDURE — 71045 X-RAY EXAM CHEST 1 VIEW: CPT | Mod: 26

## 2021-07-22 PROCEDURE — 99285 EMERGENCY DEPT VISIT HI MDM: CPT | Mod: CS

## 2021-07-22 PROCEDURE — 71275 CT ANGIOGRAPHY CHEST: CPT | Mod: 26,MA

## 2021-07-22 RX ORDER — ASPIRIN/CALCIUM CARB/MAGNESIUM 324 MG
162 TABLET ORAL ONCE
Refills: 0 | Status: COMPLETED | OUTPATIENT
Start: 2021-07-22 | End: 2021-07-22

## 2021-07-22 RX ORDER — MORPHINE SULFATE 50 MG/1
4 CAPSULE, EXTENDED RELEASE ORAL ONCE
Refills: 0 | Status: DISCONTINUED | OUTPATIENT
Start: 2021-07-22 | End: 2021-07-22

## 2021-07-22 RX ORDER — KETOROLAC TROMETHAMINE 30 MG/ML
15 SYRINGE (ML) INJECTION ONCE
Refills: 0 | Status: DISCONTINUED | OUTPATIENT
Start: 2021-07-22 | End: 2021-07-22

## 2021-07-22 RX ADMIN — Medication 162 MILLIGRAM(S): at 18:43

## 2021-07-22 RX ADMIN — MORPHINE SULFATE 4 MILLIGRAM(S): 50 CAPSULE, EXTENDED RELEASE ORAL at 18:51

## 2021-07-22 RX ADMIN — Medication 15 MILLIGRAM(S): at 23:23

## 2021-07-22 NOTE — ED PROVIDER NOTE - CARDIAC, MLM
Normal rate, regular rhythm.  Heart sounds S1, S2.  No murmurs, rubs or gallops.  No lower extremity edema/tenderness

## 2021-07-22 NOTE — ED PROVIDER NOTE - NS ED MD TWO NIGHTS YN
Spoke with pt and contacted IV therapy department - call transferred to assist pt in scheduling testing.  LINDA   Yes

## 2021-07-22 NOTE — CONSULT NOTE ADULT - SUBJECTIVE AND OBJECTIVE BOX
HPI:    65 year old F with PMHx HTN, DM, CAD s/p 2 stents (pRCA and D1) presenting with chest pain for the past few days. As per the daughter who translated, the patient has been having L sided chest pain that radiated to shoulder and back for the past 2 days. She states that the pain is constant and radiates straight to her back and is worse with exertion. She took 3 ASA 81mg today which did not help the pain. Denied SOB, HA, dizziness, palpitations, THELMA, fever, chills.     Of note, she was planned to have a stress test as outpatient in early august due to recurrent episodes of chest pain, particularly when going up the stairs.    PAST MEDICAL & SURGICAL HISTORY  HTN (hypertension)  DM (diabetes mellitus)  CAD (coronary artery disease)  ST elevation myocardial infarction (STEMI), unspecified artery  S/p bilateral carpal tunnel release  S/P excision of lipoma, shoulder  H/O coronary angioplasty with 2 stents    FAMILY HISTORY:  FAMILY HISTORY:  No pertinent family history in first degree relatives    SOCIAL HISTORY:  []smoker  []Alcohol  []Drug    ALLERGIES:  No Known Allergies    MEDICATIONS:  MEDICATIONS  (STANDING):    MEDICATIONS  (PRN):      HOME MEDICATIONS:  Home Medications:  aspirin 81 mg oral tablet: 1 tab(s) orally once a day (17 May 2019 07:13)  atorvastatin 40 mg oral tablet: 1 tab(s) orally once a day (17 May 2019 07:13)  ezetimibe 10 mg oral tablet: 1 tab(s) orally once a day (17 May 2019 07:13)  glimepiride 4 mg oral tablet: 1 tab(s) orally 2 times a day (17 May 2019 07:13)  Janumet 50 mg-1000 mg oral tablet: 1 tab(s) orally 2 times a day (17 May 2019 07:13)  lisinopril 40 mg oral tablet: 1 tab(s) orally once a day (17 May 2019 07:13)  Metoprolol Tartrate 25 mg oral tablet: 1 tab(s) orally 2 times a day (17 May 2019 07:13)  Migraine relief: 1 tab(s) orally prn, As Needed (17 May 2019 07:13)  Plavix 75 mg oral tablet: 1 tab(s) orally once a day (17 May 2019 07:13)    VITALS:   T(F): 97 (07-22 @ 19:30), Max: 97 (07-22 @ 16:48)  HR: 79 (07-22 @ 19:30) (79 - 86)  BP: 124/63 (07-22 @ 19:30) (124/63 - 153/72)  BP(mean): --  RR: 18 (07-22 @ 19:30) (18 - 18)  SpO2: 98% (07-22 @ 19:30) (96% - 98%)    I&O's Summary      REVIEW OF SYSTEMS:  CONSTITUTIONAL: No weakness, fevers or chills  EYES: No visual changes  ENT: No vertigo or throat pain   NECK: No pain or stiffness  RESPIRATORY: No cough, wheezing, hemoptysis; No shortness of breath  CARDIOVASCULAR: No chest pain or palpitations  GASTROINTESTINAL: No abdominal or epigastric pain. No nausea, vomiting, or hematemesis; No diarrhea or constipation. No melena or hematochezia.  GENITOURINARY: No dysuria, frequency or hematuria  NEUROLOGICAL: No numbness or weakness  SKIN: No itching, no rashes    PHYSICAL EXAM:  NEURO: patient is awake , alert and oriented  GEN: Not in acute distress  NECK: no thyroid enlargement, no JVD  LUNGS: Clear to auscultation bilaterally   CARDIOVASCULAR: S1/S2 present, RRR , no murmurs or rubs, no carotid bruits,  + PP bilaterally  ABD: Soft, non-tender, non-distended, +BS  EXT: No THELMA  SKIN: Intact    LABS:                        10.2   5.41  )-----------( 338      ( 22 Jul 2021 19:03 )             32.2     07-22    140  |  101  |  22<H>  ----------------------------<  136<H>  4.5   |  29  |  1.2    Ca    9.9      22 Jul 2021 19:03    TPro  7.3  /  Alb  4.6  /  TBili  0.2  /  DBili  x   /  AST  20  /  ALT  11  /  AlkPhos  78  07-22      Troponin T, Serum: <0.01 ng/mL (07-22-21 @ 19:03)    CARDIAC MARKERS ( 22 Jul 2021 19:03 )  x     / <0.01 ng/mL / x     / x     / x          RADIOLOGY:  -CXR:    -TTE (2017): EF 50-55%, inferolateral hypokinesis  -CCTA:  -STRESS TEST:  < from: NM Nuclear Stress Pharmacologic Multiple (02.26.18 @ 14:35) >  Impression:   1.   No definite evidence for ischemia during adenosine infusion as   described above.  2.  Normal left ventricular wall motion and wall thickening.  3.  Left ventricular ejection fraction calculated as 76% which is within   the range of normal    < end of copied text >    -CATHETERIZATION: 2017-pRCA stent and D1 stent    ECG:  < from: 12 Lead ECG (07.22.21 @ 05:52) >  Ventricular Rate 91 BPM    Atrial Rate 91 BPM    P-R Interval 162 ms    QRS Duration 84 ms    Q-T Interval 344 ms    QTC Calculation(Bazett) 423 ms    P Axis 40 degrees    R Axis -12 degrees    T Axis 65 degrees    Diagnosis Line Normal sinus rhythm  Minimal voltage criteria for LVH, may be normal variant ( R in aVL )  Possible Anterior infarct , age undetermined  Abnormal ECG    Confirmed by Noé Monroy ([id number]) on 7/22/2021 6:38:51 PM    < end of copied text >      TELEMETRY EVENTS:  sinus rhythm   HPI:    65 year old F with PMHx HTN, DM, CAD s/p 2 stents (pRCA and D1) presenting with chest pain for the past few days. As per the daughter who translated, the patient has been having L sided chest pain that radiated to shoulder and back for the past 2 days. She states that the pain is constant and radiates straight to her back and is worse with exertion. She took 3 ASA 81mg today which did not help the pain. Denied SOB, HA, dizziness, palpitations, THELMA, fever, chills.     Of note, she was planned to have a stress test as outpatient in early august due to recurrent episodes of chest pain, particularly when going up the stairs.      PAST MEDICAL & SURGICAL HISTORY  HTN (hypertension)  DM (diabetes mellitus)  CAD (coronary artery disease)  ST elevation myocardial infarction (STEMI), unspecified artery  S/p bilateral carpal tunnel release  S/P excision of lipoma, shoulder  H/O coronary angioplasty with 2 stents    FAMILY HISTORY:  FAMILY HISTORY:  No pertinent family history in first degree relatives    SOCIAL HISTORY:  []smoker  []Alcohol  []Drug    ALLERGIES:  No Known Allergies    MEDICATIONS:  MEDICATIONS  (STANDING):    MEDICATIONS  (PRN):      HOME MEDICATIONS:  Home Medications:  aspirin 81 mg oral tablet: 1 tab(s) orally once a day (17 May 2019 07:13)  atorvastatin 40 mg oral tablet: 1 tab(s) orally once a day (17 May 2019 07:13)  ezetimibe 10 mg oral tablet: 1 tab(s) orally once a day (17 May 2019 07:13)  glimepiride 4 mg oral tablet: 1 tab(s) orally 2 times a day (17 May 2019 07:13)  Janumet 50 mg-1000 mg oral tablet: 1 tab(s) orally 2 times a day (17 May 2019 07:13)  lisinopril 40 mg oral tablet: 1 tab(s) orally once a day (17 May 2019 07:13)  Metoprolol Tartrate 25 mg oral tablet: 1 tab(s) orally 2 times a day (17 May 2019 07:13)  Migraine relief: 1 tab(s) orally prn, As Needed (17 May 2019 07:13)  Plavix 75 mg oral tablet: 1 tab(s) orally once a day (17 May 2019 07:13)    VITALS:   T(F): 97 (07-22 @ 19:30), Max: 97 (07-22 @ 16:48)  HR: 79 (07-22 @ 19:30) (79 - 86)  BP: 124/63 (07-22 @ 19:30) (124/63 - 153/72)  BP(mean): --  RR: 18 (07-22 @ 19:30) (18 - 18)  SpO2: 98% (07-22 @ 19:30) (96% - 98%)    I&O's Summary      REVIEW OF SYSTEMS:  CONSTITUTIONAL: No weakness, fevers or chills  EYES: No visual changes  ENT: No vertigo or throat pain   NECK: No pain or stiffness  RESPIRATORY: No cough, wheezing, hemoptysis; No shortness of breath  CARDIOVASCULAR: No chest pain or palpitations  GASTROINTESTINAL: No abdominal or epigastric pain. No nausea, vomiting, or hematemesis; No diarrhea or constipation. No melena or hematochezia.  GENITOURINARY: No dysuria, frequency or hematuria  NEUROLOGICAL: No numbness or weakness  SKIN: No itching, no rashes    PHYSICAL EXAM:  NEURO: patient is awake , alert and oriented  GEN: Not in acute distress  NECK: no thyroid enlargement, no JVD  LUNGS: Clear to auscultation bilaterally   CARDIOVASCULAR: S1/S2 present, RRR , no murmurs or rubs, no carotid bruits,  + PP bilaterally  ABD: Soft, non-tender, non-distended, +BS  EXT: No THELMA  SKIN: Intact    LABS:                        10.2   5.41  )-----------( 338      ( 22 Jul 2021 19:03 )             32.2     07-22    140  |  101  |  22<H>  ----------------------------<  136<H>  4.5   |  29  |  1.2    Ca    9.9      22 Jul 2021 19:03    TPro  7.3  /  Alb  4.6  /  TBili  0.2  /  DBili  x   /  AST  20  /  ALT  11  /  AlkPhos  78  07-22      Troponin T, Serum: <0.01 ng/mL (07-22-21 @ 19:03)    CARDIAC MARKERS ( 22 Jul 2021 19:03 )  x     / <0.01 ng/mL / x     / x     / x          RADIOLOGY:  -CXR:    -TTE (2017): EF 50-55%, inferolateral hypokinesis  -CCTA:  -STRESS TEST:  < from: NM Nuclear Stress Pharmacologic Multiple (02.26.18 @ 14:35) >  Impression:   1.   No definite evidence for ischemia during adenosine infusion as   described above.  2.  Normal left ventricular wall motion and wall thickening.  3.  Left ventricular ejection fraction calculated as 76% which is within   the range of normal    < end of copied text >    -CATHETERIZATION: 2017-pRCA stent and D1 stent    ECG:  < from: 12 Lead ECG (07.22.21 @ 05:52) >  Ventricular Rate 91 BPM    Atrial Rate 91 BPM    P-R Interval 162 ms    QRS Duration 84 ms    Q-T Interval 344 ms    QTC Calculation(Bazett) 423 ms    P Axis 40 degrees    R Axis -12 degrees    T Axis 65 degrees    Diagnosis Line Normal sinus rhythm  Minimal voltage criteria for LVH, may be normal variant ( R in aVL )  Possible Anterior infarct , age undetermined  Abnormal ECG    Confirmed by Noé Monroy ([id number]) on 7/22/2021 6:38:51 PM    < end of copied text >      TELEMETRY EVENTS:  sinus rhythm

## 2021-07-22 NOTE — ED ADULT NURSE NOTE - SUICIDE SCREENING QUESTION 1
[address] [facility] [address]  Phone: [phone]             May 18, 2019    Patient: Braon Peacock   YOB: 1976   Date of Visit: 5/15/2019       To Whom It May Concern:    Baron Peacock was seen and treated in our facility  beginning 5/15/2019 until 5/18/2019. He may return to work on 5/20/2019.       Sincerely,       Rafat Epps RN         Signature:__________________________________
No

## 2021-07-22 NOTE — ED PROVIDER NOTE - ATTENDING CONTRIBUTION TO CARE
Declines , uses son at bedside.  65yoF with h/o HTN, DM, MI s/p 2 stents, on ASA/Plavix, presents with CP. Reports L sided CP radiating to her shoulder and straight to her back, constant, worse with exertion, x 2 days. Improved with ASA (took 3 ASA 81 today). Associated FINCH. Denies leg pain or swelling, recent travel, vomiting, fever, and all other symptoms. On exam, afebrile, hemodynamically stable, saturating well, NAD, nontoxic appearing, no WOB, laying comfortably in bed, head NCAT, EOMI grossly, anicteric, MMM, no JVD, RRR, nml S1/S2, no m/r/g, lungs CTAB, no w/r/r, abd soft, NT, ND, nml BS, no rebound or guarding, AAO, CN's 3-12 grossly intact, SUBRAMANIAN spontaneously, no leg cyanosis or edema, 2+ symm radial and DP pulses, skin warm, well perfused, no rashes or hives. Appearance low suspicion for dissection and no murmur or pulse asymmetry, CTA ___________. Character low suspicion for PE and no tachycardia, hypoxia, or e/o DVT or acute risk factors for this. No e/o PNA, PTX, or fluid overload on exam or CXR. Concern for ACS given h, 2 serial ECG's unremarkable and trop negative. Seen by Dr. Monroy here in ED per family, and later d/w cardio and rec admit to tele. Declines , uses son at bedside.  65yoF with h/o HTN, DM, MI s/p 2 stents, on ASA/Plavix, presents with CP. Reports L sided CP radiating to her shoulder and straight to her back, constant, worse with exertion, x 2 days. Improved with ASA (took 3 ASA 81 today). Associated FINCH. Denies leg pain or swelling, recent travel, vomiting, fever, and all other symptoms. On exam, afebrile, hemodynamically stable, saturating well, NAD, nontoxic appearing, no WOB, laying comfortably in bed, head NCAT, EOMI grossly, anicteric, MMM, no JVD, RRR, nml S1/S2, no m/r/g, lungs CTAB, no w/r/r, abd soft, NT, ND, nml BS, no rebound or guarding, AAO, CN's 3-12 grossly intact, SUBRAMANIAN spontaneously, no leg cyanosis or edema, 2+ symm radial and DP pulses, skin warm, well perfused, no rashes or hives. Appearance low suspicion for dissection and no murmur or pulse asymmetry, CTA negative. Character low suspicion for PE and no tachycardia, hypoxia, or e/o DVT or acute risk factors for this. No e/o PNA, PTX, or fluid overload on exam or CXR. Concern for ACS given h, 2 serial ECG's unremarkable and trop negative. Seen by Dr. Monroy here in ED per family, and later d/w cardio and rec admit to tele. Patient well appearing, hemodynamically stable, laying comfortably in bed at this time. I d/w daughter the CT pancreas findings as well and need for outpt f/u. Admitted to internal medicine for further monitoring, w/u, and care.

## 2021-07-22 NOTE — ED PROVIDER NOTE - OBJECTIVE STATEMENT
65 y.o. female with a PMH of HTN, DM, ASHD presented to the ER c/o constant Left sided chest pain radiating to Left shoulder and arm for the past 2 days.  Pt states that ASA temporarily makes pain better but the pain doesn't completely go away.  No exacerbating factors. Denies fever, chills, cough, travel, extremity edema/pain, dizziness, palpitations, SOB.  (+) cardiac stents x2.  No other complaints.

## 2021-07-22 NOTE — ED ADULT NURSE NOTE - NS ED NURSE LEVEL OF CONSCIOUSNESS ORIENTATION
The patient is a 46y Male complaining of shortness of breath. Oriented - self; Oriented - place; Oriented - time

## 2021-07-22 NOTE — CONSULT NOTE ADULT - ATTENDING COMMENTS
Patient seen and examined with the fellow on 07/22/21 at 7 pm.  Reports chest discomfort and dyspnea with exertion.  No chest pain at the time of evaluation.  ECG with no ischemic changes  Known CAD, s/p PCI    Recommend:    Serial troponins  Repeat ECG in AM  If no acute changes - nuclear stress test in AM.  C/w medical therapy

## 2021-07-22 NOTE — ED ADULT TRIAGE NOTE - DOMESTIC TRAVEL HIGH RISK QUESTION
the right base. Patient was also diuresed with IV Lasix and had a net output of 4.3 L. Blood cultures have been negative to date. Patient was instructed to use incentive spirometer as frequently as possible. Speech was consulted to evaluate for underlying aspiration. Beulah case with daughter      Review of Systems:     Constitutional: Denies any fatigue or weakness  Cardiac: Admits to some SOB denies chest pain or palpations  Pulm: Denies any wheezing, admits to a productive cough  GI: Denies abdominal pain, nausea, vomiting  MSK: denies any weakness, numbness or tingling  Psych: denies any suicidal ideations. Medications: Allergies:  No Known Allergies    Current Meds:   Scheduled Meds:    dextromethorphan-guaiFENesin  10 mL Oral 4 times per day    cefepime  2 g Intravenous Q12H    warfarin (COUMADIN) daily dosing (placeholder)   Other RX Placeholder    multivitamin  1 tablet Oral Daily    midodrine  5 mg Oral TID WC    sodium chloride flush  10 mL Intravenous 2 times per day    ipratropium-albuterol  1 ampule Inhalation Q4H WA    digoxin  125 mcg Oral Daily    dilTIAZem  120 mg Oral Daily    finasteride  5 mg Oral Daily    levothyroxine  75 mcg Oral Daily    tamsulosin  0.8 mg Oral Nightly     Continuous Infusions:     PRN Meds: nicotine, promethazine **OR** ondansetron, magnesium hydroxide, acetaminophen **OR** acetaminophen, albuterol    Data:     Past Medical History:   has a past medical history of Atherosclerosis, Benign prostatic hyperplasia, Hypertension, Hypothyroidism, Osteoporosis, Paroxysmal A-fib ([address] Pneumonia, Rhabdomyolysis, Smoking, Spondylolisthesis, and Vitamin D deficiency. Social History:   reports that he has never smoked. He has never used smokeless tobacco. He reports that he does not drink alcohol or use drugs. Family History: History reviewed. No pertinent family history.     Vitals:  BP (!) 93/45   Pulse 67   Temp 97.9 °F (36.6 °C) (Oral)   Resp 22   Ht 6' 3\" (1.905 m)   Wt 180 lb (81.6 kg)   SpO2 99%   BMI 22.50 kg/m²   Temp (24hrs), Av °F (36.7 °C), Min:97.7 °F (36.5 °C), Max:98.8 °F (37.1 °C)    No results for input(s): POCGLU in the last 72 hours. I/O (24Hr): Intake/Output Summary (Last 24 hours) at 2020 1402  Last data filed at 2020 0551  Gross per 24 hour   Intake 1220 ml   Output 4170 ml   Net -2950 ml       Labs:  Hematology:  Recent Labs     20  0410 20  0538 20  0529   WBC 20.6* 16.8* 14.5*   RBC 3.10* 2.90* 3.05*   HGB 9.0* 8.5* 9.0*   HCT 31.3* 27.9* 28.5*   .0 96.2 93.4   MCH 29.0 29.3 29.5   MCHC 28.8 30.5 31.6   RDW 20.1* 19.6* 19.7*    227 273   MPV 11.0 10.2 11.0   INR 2.1 1.4 1.2     Chemistry:  Recent Labs     20  21420  21420  0410 20  1159 20  1159 20  0538 20  0538 20  2207 20  0351 20  0529 20  1017   NA  --    < > 133* 135  --  137  --   --   --  136  --    K  --   --  3.9  --   --  3.5*  --   --   --  3.0*  --    CL  --   --  101  --   --  104  --   --   --  94*  --    CO2  --   --  27  --   --  25  --   --   --  33*  --    GLUCOSE  --   --  104*  --   --  100*  --   --   --  107*  --    BUN  --   --  25*  --   --  17  --   --   --  19  --    CREATININE  --   --  1.16  --   --  0.85  --   --   --  0.85  --    MG  --   --   --   --   --  2.1  --   --   --  2.0  --    ANIONGAP  --   --  5*  --   --  8*  --   --   --  9  --    LABGLOM  --   --  >60  --   --  >60  --   --   --  >60  --    GFRAA  --   --  >60  --   --  >60  --   --   --  >60  --    CALCIUM  --   --  8.4*  --   --  8.5*  --   --   --  8.7  --    PHOS  --   --   --   --   --  2.7  --   --   --  3.0  --    TROPHS  --    < > 30* 29*   < > 41*   < > 41* 39*  --  36*   DIGOXIN  --   --  0.8  --   --   --   --   --   --   --   --    LACTACIDWB 1.7  --   --   --   --   --   --   --   --   --   --     < > = values in this interval not displayed.      Recent Labs 12/27/20  0538 12/28/20  0529   LABALBU 1.8* 2.2*     ABG:No results found for: POCPH, PHART, PH, POCPCO2, LHX3SHI, PCO2, POCPO2, PO2ART, PO2, POCHCO3, KQR4ZHR, HCO3, NBEA, PBEA, BEART, BE, THGBART, THB, JNB2IVK, AJPO2JIG, Y8YAXRKQ, O2SAT, FIO2  Lab Results   Component Value Date/Time    SPECIAL NOT REPORTED 12/26/2020 05:12 PM     Lab Results   Component Value Date/Time    CULTURE PRESUMPTIVE CANDIDA ALBICANS 10 to 50,000 CFU/ML (A) 12/26/2020 05:12 PM       Radiology:  [facility] Chest (2 Vw)    Result Date: 12/26/2020  Similar findings to those seen on recent CT, compatible with pneumonia and some basilar atelectasis/consolidation. Xr Chest Portable    Result Date: 12/25/2020  Subtle patchy bilateral lung opacities increased from the prior study are likely on the basis of pneumonia. Ct Chest Pulmonary Embolism W Contrast    Result Date: 12/25/2020  No central or segmental pulmonary embolus. Bilateral lower lobe consolidation and scattered bilateral ground-glass nodular opacities are likely on the basis of pneumonia. Recommend follow-up.        Physical Examination:        General appearance: Chronically ill-appearing male resting comfortably in bed in no acute distress  Mental Status: comfortable oriented x2  Lungs:  Course to auscultation the lower lobes bilaterally with diminished breath sounds at the bases slightly improved compared to previous  Heart:  regular rate and rhythm, no murmur  Abdomen:  soft, nontender, nondistended, normal bowel sounds, no masses, hepatomegaly, splenomegaly +sacral ulcer  Extremities:  no edema, redness, tenderness in the calves  Skin:  no gross lesions, rashes, induration    Assessment:        Hospital Problems           Last Modified POA    * (Principal) Community acquired bacterial pneumonia 12/26/2020 Yes    Hypertension 12/25/2020 Yes    Hypothyroidism 12/25/2020 Yes    Benign prostatic hyperplasia 12/25/2020 Yes    Paroxysmal atrial fibrillation ([address] 12/25/2020 Yes Chronic anticoagulation 12/25/2020 Yes    Lactic acidosis 12/26/2020 Yes    Sepsis ([facility] [address] 12/26/2020 Yes    Acute respiratory failure with hypoxia ([facility] [address] 12/27/2020 Yes          Plan:        1. Acute hypoxic respiratory failure 2/2 pneumonia, atelectasis,volume overload and aspiration: Cultures remain negative. wean High flow nasal cannula, down to FIO2 50% from 60%. Continue IV antibiotics. Speech consulted, aspiration precautions. Continue incentive spirometry, Duo neb's. Hold IV lasix as pt appears more Euvolemic today 12/28 and had 4800 cc of urine output in 24 hours. 2. Acute Hypokalemia: replace potassium   3. Abnormal UA: Candiduria likely colonization. 4. Paroxysmal atrial fibrillation: Continue Cardizem and Coumadin   5. Hypothyroidism: Continue Synthroid 75 mcg daily  6. Continue daily multivitamin  7. BPH: Continue Flomax , Bladder scan q shift, straight cath for urinary retention greater than 250 cc  8. Avoid any QTC prolonging drugs  9. NCNC anemia:Hgb did decrease from 10.8-8.5 suspect dilutional component. Continue to monitor hemoglobin, transfuse as needed for symptomatic anemia or hemoglobin less than 7  10. Labs, imaging reviewed. 11. PT/OT  12. Updated daughter.      Melissa AdamsDO alin  12/28/2020  2:02 PM No

## 2021-07-22 NOTE — ED PROVIDER NOTE - CLINICAL SUMMARY MEDICAL DECISION MAKING FREE TEXT BOX
Declines , uses son at bedside.  65yoF with h/o HTN, DM, MI s/p 2 stents, on ASA/Plavix, presents with CP. Reports L sided CP radiating to her shoulder and straight to her back, constant, worse with exertion, x 2 days. Improved with ASA (took 3 ASA 81 today). Associated FINCH. Denies leg pain or swelling, recent travel, vomiting, fever, and all other symptoms. On exam, afebrile, hemodynamically stable, saturating well, NAD, nontoxic appearing, no WOB, laying comfortably in bed, head NCAT, EOMI grossly, anicteric, MMM, no JVD, RRR, nml S1/S2, no m/r/g, lungs CTAB, no w/r/r, abd soft, NT, ND, nml BS, no rebound or guarding, AAO, CN's 3-12 grossly intact, SUBRAMANIAN spontaneously, no leg cyanosis or edema, 2+ symm radial and DP pulses, skin warm, well perfused, no rashes or hives. Appearance low suspicion for dissection and no murmur or pulse asymmetry, CTA negative. Character low suspicion for PE and no tachycardia, hypoxia, or e/o DVT or acute risk factors for this. No e/o PNA, PTX, or fluid overload on exam or CXR. Concern for ACS given h, 2 serial ECG's unremarkable and trop negative. Seen by Dr. Monroy here in ED per family, and later d/w cardio and rec admit to tele. Patient well appearing, hemodynamically stable, laying comfortably in bed at this time. I d/w daughter the CT pancreas findings as well and need for outpt f/u. Admitted to internal medicine for further monitoring, w/u, and care.

## 2021-07-22 NOTE — CONSULT NOTE ADULT - ASSESSMENT
Impression:    Recurrent, episodic chest pain  CAD s/p 2 stent (RCA and D1)  HTN  DLD    Reccomendations:  -serial troponins  -repeat EKG  -TTE  -will need to get stress test vs cath however patient prefers non-invasive testing still  -c/w ASA, plavix, atorvastatin, metoprolol, zetia

## 2021-07-23 ENCOUNTER — TRANSCRIPTION ENCOUNTER (OUTPATIENT)
Age: 66
End: 2021-07-23

## 2021-07-23 VITALS
HEART RATE: 79 BPM | OXYGEN SATURATION: 98 % | RESPIRATION RATE: 18 BRPM | SYSTOLIC BLOOD PRESSURE: 152 MMHG | DIASTOLIC BLOOD PRESSURE: 66 MMHG | TEMPERATURE: 98 F

## 2021-07-23 LAB
A1C WITH ESTIMATED AVERAGE GLUCOSE RESULT: 6.4 % — HIGH (ref 4–5.6)
ALBUMIN SERPL ELPH-MCNC: 4 G/DL — SIGNIFICANT CHANGE UP (ref 3.5–5.2)
ALP SERPL-CCNC: 72 U/L — SIGNIFICANT CHANGE UP (ref 30–115)
ALT FLD-CCNC: 9 U/L — SIGNIFICANT CHANGE UP (ref 0–41)
ANION GAP SERPL CALC-SCNC: 13 MMOL/L — SIGNIFICANT CHANGE UP (ref 7–14)
APTT BLD: 29 SEC — SIGNIFICANT CHANGE UP (ref 27–39.2)
AST SERPL-CCNC: 18 U/L — SIGNIFICANT CHANGE UP (ref 0–41)
BASOPHILS # BLD AUTO: 0.05 K/UL — SIGNIFICANT CHANGE UP (ref 0–0.2)
BASOPHILS NFR BLD AUTO: 1 % — SIGNIFICANT CHANGE UP (ref 0–1)
BILIRUB SERPL-MCNC: <0.2 MG/DL — SIGNIFICANT CHANGE UP (ref 0.2–1.2)
BUN SERPL-MCNC: 23 MG/DL — HIGH (ref 10–20)
CALCIUM SERPL-MCNC: 8.9 MG/DL — SIGNIFICANT CHANGE UP (ref 8.5–10.1)
CHLORIDE SERPL-SCNC: 103 MMOL/L — SIGNIFICANT CHANGE UP (ref 98–110)
CHOLEST SERPL-MCNC: 117 MG/DL — SIGNIFICANT CHANGE UP
CK MB CFR SERPL CALC: 1.1 NG/ML — SIGNIFICANT CHANGE UP (ref 0.6–6.3)
CO2 SERPL-SCNC: 24 MMOL/L — SIGNIFICANT CHANGE UP (ref 17–32)
CREAT SERPL-MCNC: 1.1 MG/DL — SIGNIFICANT CHANGE UP (ref 0.7–1.5)
EOSINOPHIL # BLD AUTO: 0.07 K/UL — SIGNIFICANT CHANGE UP (ref 0–0.7)
EOSINOPHIL NFR BLD AUTO: 1.4 % — SIGNIFICANT CHANGE UP (ref 0–8)
ESTIMATED AVERAGE GLUCOSE: 137 MG/DL — HIGH (ref 68–114)
GLUCOSE SERPL-MCNC: 117 MG/DL — HIGH (ref 70–99)
HCT VFR BLD CALC: 30.3 % — LOW (ref 37–47)
HCV AB S/CO SERPL IA: 0.03 COI — SIGNIFICANT CHANGE UP
HCV AB SERPL-IMP: SIGNIFICANT CHANGE UP
HDLC SERPL-MCNC: 37 MG/DL — LOW
HGB BLD-MCNC: 9.4 G/DL — LOW (ref 12–16)
IMM GRANULOCYTES NFR BLD AUTO: 0.2 % — SIGNIFICANT CHANGE UP (ref 0.1–0.3)
INR BLD: 0.9 RATIO — SIGNIFICANT CHANGE UP (ref 0.65–1.3)
LIPID PNL WITH DIRECT LDL SERPL: 60 MG/DL — SIGNIFICANT CHANGE UP
LYMPHOCYTES # BLD AUTO: 2.07 K/UL — SIGNIFICANT CHANGE UP (ref 1.2–3.4)
LYMPHOCYTES # BLD AUTO: 40 % — SIGNIFICANT CHANGE UP (ref 20.5–51.1)
MCHC RBC-ENTMCNC: 25.4 PG — LOW (ref 27–31)
MCHC RBC-ENTMCNC: 31 G/DL — LOW (ref 32–37)
MCV RBC AUTO: 81.9 FL — SIGNIFICANT CHANGE UP (ref 81–99)
MONOCYTES # BLD AUTO: 0.3 K/UL — SIGNIFICANT CHANGE UP (ref 0.1–0.6)
MONOCYTES NFR BLD AUTO: 5.8 % — SIGNIFICANT CHANGE UP (ref 1.7–9.3)
NEUTROPHILS # BLD AUTO: 2.67 K/UL — SIGNIFICANT CHANGE UP (ref 1.4–6.5)
NEUTROPHILS NFR BLD AUTO: 51.6 % — SIGNIFICANT CHANGE UP (ref 42.2–75.2)
NON HDL CHOLESTEROL: 80 MG/DL — SIGNIFICANT CHANGE UP
NRBC # BLD: 0 /100 WBCS — SIGNIFICANT CHANGE UP (ref 0–0)
PLATELET # BLD AUTO: 290 K/UL — SIGNIFICANT CHANGE UP (ref 130–400)
POTASSIUM SERPL-MCNC: 4.8 MMOL/L — SIGNIFICANT CHANGE UP (ref 3.5–5)
POTASSIUM SERPL-SCNC: 4.8 MMOL/L — SIGNIFICANT CHANGE UP (ref 3.5–5)
PROT SERPL-MCNC: 6.4 G/DL — SIGNIFICANT CHANGE UP (ref 6–8)
PROTHROM AB SERPL-ACNC: 10.4 SEC — SIGNIFICANT CHANGE UP (ref 9.95–12.87)
RBC # BLD: 3.7 M/UL — LOW (ref 4.2–5.4)
RBC # FLD: 15.2 % — HIGH (ref 11.5–14.5)
SARS-COV-2 RNA SPEC QL NAA+PROBE: SIGNIFICANT CHANGE UP
SODIUM SERPL-SCNC: 140 MMOL/L — SIGNIFICANT CHANGE UP (ref 135–146)
TRIGL SERPL-MCNC: 89 MG/DL — SIGNIFICANT CHANGE UP
TROPONIN T SERPL-MCNC: <0.01 NG/ML — SIGNIFICANT CHANGE UP
WBC # BLD: 5.17 K/UL — SIGNIFICANT CHANGE UP (ref 4.8–10.8)
WBC # FLD AUTO: 5.17 K/UL — SIGNIFICANT CHANGE UP (ref 4.8–10.8)

## 2021-07-23 PROCEDURE — 93018 CV STRESS TEST I&R ONLY: CPT

## 2021-07-23 PROCEDURE — 99223 1ST HOSP IP/OBS HIGH 75: CPT

## 2021-07-23 PROCEDURE — 78452 HT MUSCLE IMAGE SPECT MULT: CPT | Mod: 26

## 2021-07-23 PROCEDURE — 99232 SBSQ HOSP IP/OBS MODERATE 35: CPT

## 2021-07-23 PROCEDURE — 93016 CV STRESS TEST SUPVJ ONLY: CPT

## 2021-07-23 PROCEDURE — 93010 ELECTROCARDIOGRAM REPORT: CPT

## 2021-07-23 PROCEDURE — 93306 TTE W/DOPPLER COMPLETE: CPT | Mod: 26

## 2021-07-23 RX ORDER — MORPHINE SULFATE 50 MG/1
2 CAPSULE, EXTENDED RELEASE ORAL EVERY 4 HOURS
Refills: 0 | Status: DISCONTINUED | OUTPATIENT
Start: 2021-07-23 | End: 2021-07-23

## 2021-07-23 RX ORDER — ACETAMINOPHEN 500 MG
650 TABLET ORAL EVERY 6 HOURS
Refills: 0 | Status: DISCONTINUED | OUTPATIENT
Start: 2021-07-23 | End: 2021-07-23

## 2021-07-23 RX ORDER — LISINOPRIL 2.5 MG/1
1 TABLET ORAL
Qty: 0 | Refills: 0 | DISCHARGE

## 2021-07-23 RX ORDER — ASPIRIN/CALCIUM CARB/MAGNESIUM 324 MG
81 TABLET ORAL DAILY
Refills: 0 | Status: DISCONTINUED | OUTPATIENT
Start: 2021-07-23 | End: 2021-07-23

## 2021-07-23 RX ORDER — NITROGLYCERIN 6.5 MG
0.4 CAPSULE, EXTENDED RELEASE ORAL
Refills: 0 | Status: DISCONTINUED | OUTPATIENT
Start: 2021-07-23 | End: 2021-07-23

## 2021-07-23 RX ORDER — RANOLAZINE 500 MG/1
1 TABLET, FILM COATED, EXTENDED RELEASE ORAL
Qty: 60 | Refills: 0
Start: 2021-07-23 | End: 2021-08-21

## 2021-07-23 RX ORDER — ATORVASTATIN CALCIUM 80 MG/1
40 TABLET, FILM COATED ORAL DAILY
Refills: 0 | Status: DISCONTINUED | OUTPATIENT
Start: 2021-07-23 | End: 2021-07-23

## 2021-07-23 RX ORDER — HEPARIN SODIUM 5000 [USP'U]/ML
5000 INJECTION INTRAVENOUS; SUBCUTANEOUS EVERY 8 HOURS
Refills: 0 | Status: DISCONTINUED | OUTPATIENT
Start: 2021-07-23 | End: 2021-07-23

## 2021-07-23 RX ORDER — LISINOPRIL 2.5 MG/1
20 TABLET ORAL DAILY
Refills: 0 | Status: DISCONTINUED | OUTPATIENT
Start: 2021-07-23 | End: 2021-07-23

## 2021-07-23 RX ORDER — CLOPIDOGREL BISULFATE 75 MG/1
75 TABLET, FILM COATED ORAL DAILY
Refills: 0 | Status: DISCONTINUED | OUTPATIENT
Start: 2021-07-23 | End: 2021-07-23

## 2021-07-23 RX ORDER — ADENOSINE 3 MG/ML
60 INJECTION INTRAVENOUS ONCE
Refills: 0 | Status: COMPLETED | OUTPATIENT
Start: 2021-07-23 | End: 2021-07-23

## 2021-07-23 RX ORDER — CHLORHEXIDINE GLUCONATE 213 G/1000ML
1 SOLUTION TOPICAL
Refills: 0 | Status: DISCONTINUED | OUTPATIENT
Start: 2021-07-23 | End: 2021-07-23

## 2021-07-23 RX ORDER — SODIUM CHLORIDE 9 MG/ML
1000 INJECTION INTRAMUSCULAR; INTRAVENOUS; SUBCUTANEOUS
Refills: 0 | Status: DISCONTINUED | OUTPATIENT
Start: 2021-07-23 | End: 2021-07-23

## 2021-07-23 RX ORDER — ONDANSETRON 8 MG/1
4 TABLET, FILM COATED ORAL EVERY 8 HOURS
Refills: 0 | Status: DISCONTINUED | OUTPATIENT
Start: 2021-07-23 | End: 2021-07-23

## 2021-07-23 RX ORDER — METOPROLOL TARTRATE 50 MG
25 TABLET ORAL
Refills: 0 | Status: DISCONTINUED | OUTPATIENT
Start: 2021-07-23 | End: 2021-07-23

## 2021-07-23 RX ADMIN — ONDANSETRON 4 MILLIGRAM(S): 8 TABLET, FILM COATED ORAL at 01:22

## 2021-07-23 RX ADMIN — ADENOSINE 600 MILLIGRAM(S): 3 INJECTION INTRAVENOUS at 13:40

## 2021-07-23 RX ADMIN — Medication 25 MILLIGRAM(S): at 06:51

## 2021-07-23 RX ADMIN — SODIUM CHLORIDE 70 MILLILITER(S): 9 INJECTION INTRAMUSCULAR; INTRAVENOUS; SUBCUTANEOUS at 03:27

## 2021-07-23 RX ADMIN — HEPARIN SODIUM 5000 UNIT(S): 5000 INJECTION INTRAVENOUS; SUBCUTANEOUS at 06:51

## 2021-07-23 NOTE — DISCHARGE NOTE PROVIDER - CARE PROVIDER_API CALL
Noé Monroy (MD)  Cardiovascular Disease; Internal Medicine; Interventional Cardiology  [address] [place] [address]  Phone: [phone]  Fax: [phone]  Follow Up Time: 2 weeks    Brianna Godinez)  Internal Medicine  [address] [place] [address]  Phone: [phone]  Fax: [phone]  Follow Up Time: 1 week

## 2021-07-23 NOTE — H&P ADULT - ATTENDING COMMENTS
HPI:  65 year old Chinese speaking woman with a PMHx HTN, DM, CAD s/p 2 stents (pRCA and D1) 2017 presenting with chest pain for the past few days. She has been having L sided chest pain that radiated to shoulder and back for the past 2 days, worsened with exertion, or deep breathing. The pain is described as 8/10, and twisting/sharp. She has not noted anything to help alleviate the pain. She vomited once, non-bloody.  She was to have a stress test as an out patient in august for this recurrent chest pain. She denied any palpitations, SOB,  Trop negative and EKG showed no overt ischemic changes.   Seen by cardio and patient is to have stress test this morning.   Currently her chest pain has remained unchanged.    REVIEW OF SYSTEMS:  CONSTITUTIONAL:  No weakness, fevers, chills, night sweats, weight loss  EYES/ENT: No visual changes. No vertigo or dysphagia  NECK: No neck pain or stiffness  RESPIRATORY: No cough, wheezing, hemoptysis. No shortness of breath  CARDIOVASCULAR: + chest pain. No palpitations. No lower extremity edema  GASTROINTESTINAL: No abdominal pain. No nausea, vomiting, diarrhea, or hematemesis  GENITOURINARY: No dysuria or hematuria   NEUROLOGICAL: No focal numbness or weakness  SKIN: No rashes or itching  HEMATOLOGIC: No easy bruising or prolonged bleeding.      PHYSICAL EXAM:  GENERAL: NAD, obese, Non-toxic, stated age   HEAD:  Atraumatic, Normocephalic  EYES: EOMI, Sclera White   NECK: Supple, No JVD  CHEST/LUNG: Clear to auscultation bilaterally; No wheezing, rhonchi, or crackles. Left sided chest pain reproducible with palpation.   HEART: Regular rate and rhythm; s1, s2, No murmurs, rubs, or gallops  ABDOMEN: Soft, Nontender, Nondistended; Bowel sounds present, No rebound or guarding noted   EXTREMITIES:  No lower extremity edema. Chronic L calf tenderness to palpation.  No clubbing or cyanosis  PSYCH: AAOx3, pleasant, cooperative, not anxious  NEUROLOGY: non-focal  SKIN: No rashes or lesions      ASSESSMENT AND PLAN:  Atypical L sided chest pain: r/o ACS   CAD s/p PCI   HTN  -Cardio Following: to go for NM stress test today.  -Cont with tele monitoring  -Trend trops, Check 2D Echo, AM EKG  -On ASA 81mg, Atorvastatin, metoprolol, lisinopril    Normocytic Anemia: no clinical evidence of bleeding  -check iron studies, b12, and folate     CKD III: at baseline  -trend Cr, monitor closely for DEEPALI after receiving contrast from CTA   -Start on NS at 100cc/hr for now. May also need cath depending on stress test results     Diabetes: Basal bolus insulin, keep fingerstick glucose <180.     DVT ppx: Heparin  GI ppx: Not indicated  GOC: Full code.      My note supersedes the residents in the event of a discrepancy.

## 2021-07-23 NOTE — DISCHARGE NOTE NURSING/CASE MANAGEMENT/SOCIAL WORK - PATIENT PORTAL LINK FT
You can access the FollowMyHealth Patient Portal offered by [facility] by registering at the following website: [email or web address]. By joining [employer]’s FollowMyHealth portal, you will also be able to view your health information using other applications (apps) compatible with our system.

## 2021-07-23 NOTE — PROGRESS NOTE ADULT - SUBJECTIVE AND OBJECTIVE BOX
INTERVAL HPI/OVERNIGHT EVENTS:    SUBJECTIVE: Patient seen and examined at bedside.     no sob, abd pain, fever  +cp pain, L sided, nonradiating, unchanged with exertion    OBJECTIVE:    VITAL SIGNS:  Vital Signs Last 24 Hrs  T(C): 35.6 (23 Jul 2021 07:26), Max: 36.2 (22 Jul 2021 23:33)  T(F): 96.1 (23 Jul 2021 07:26), Max: 97.1 (22 Jul 2021 23:33)  HR: 75 (23 Jul 2021 07:26) (73 - 86)  BP: 121/57 (23 Jul 2021 07:26) (105/56 - 153/72)  BP(mean): --  RR: 18 (23 Jul 2021 07:26) (18 - 18)  SpO2: 99% (23 Jul 2021 07:26) (95% - 99%)      PHYSICAL EXAM:    General: NAD  HEENT: NC/AT; PERRL, clear conjunctiva  Neck: supple  Respiratory: CTA b/l  Cardiovascular: +S1/S2; RRR  Abdomen: soft, NT/ND; +BS x4  Extremities: WWP, 2+ peripheral pulses b/l; no LE edema  Skin: normal color and turgor; no rash  Neurological:    MEDICATIONS:  MEDICATIONS  (STANDING):  aDENosine Injectable (ADENOSCAN) 60 milliGRAM(s) IV Bolus once  aspirin  chewable 81 milliGRAM(s) Oral daily  atorvastatin Oral Tab/Cap - Peds 40 milliGRAM(s) Oral daily  chlorhexidine 4% Liquid 1 Application(s) Topical <User Schedule>  clopidogrel Tablet 75 milliGRAM(s) Oral daily  heparin   Injectable 5000 Unit(s) SubCutaneous every 8 hours  lisinopril 20 milliGRAM(s) Oral daily  metoprolol tartrate 25 milliGRAM(s) Oral two times a day  sodium chloride 0.9%. 1000 milliLiter(s) (50 mL/Hr) IV Continuous <Continuous>    MEDICATIONS  (PRN):  acetaminophen   Tablet .. 650 milliGRAM(s) Oral every 6 hours PRN Temp greater or equal to 38.5C (101.3F), Mild Pain (1 - 3)  morphine  - Injectable 2 milliGRAM(s) IV Push every 4 hours PRN Severe Pain (7 - 10)  nitroglycerin     SubLingual 0.4 milliGRAM(s) SubLingual every 5 minutes PRN Chest Pain  ondansetron Injectable 4 milliGRAM(s) IV Push every 8 hours PRN Nausea and/or Vomiting      ALLERGIES:  Allergies    No Known Allergies    Intolerances        LABS:                        9.4    5.17  )-----------( 290      ( 23 Jul 2021 05:26 )             30.3     Hemoglobin: 9.4 g/dL (07-23 @ 05:26)  Hemoglobin: 10.2 g/dL (07-22 @ 19:03)    CBC Full  -  ( 23 Jul 2021 05:26 )  WBC Count : 5.17 K/uL  RBC Count : 3.70 M/uL  Hemoglobin : 9.4 g/dL  Hematocrit : 30.3 %  Platelet Count - Automated : 290 K/uL  Mean Cell Volume : 81.9 fL  Mean Cell Hemoglobin : 25.4 pg  Mean Cell Hemoglobin Concentration : 31.0 g/dL  Auto Neutrophil # : 2.67 K/uL  Auto Lymphocyte # : 2.07 K/uL  Auto Monocyte # : 0.30 K/uL  Auto Eosinophil # : 0.07 K/uL  Auto Basophil # : 0.05 K/uL  Auto Neutrophil % : 51.6 %  Auto Lymphocyte % : 40.0 %  Auto Monocyte % : 5.8 %  Auto Eosinophil % : 1.4 %  Auto Basophil % : 1.0 %    07-23    140  |  103  |  23<H>  ----------------------------<  117<H>  4.8   |  24  |  1.1    Ca    8.9      23 Jul 2021 05:26    TPro  6.4  /  Alb  4.0  /  TBili  <0.2  /  DBili  x   /  AST  18  /  ALT  9   /  AlkPhos  72  07-23    Creatinine Trend: 1.1<--, 1.2<--  LIVER FUNCTIONS - ( 23 Jul 2021 05:26 )  Alb: 4.0 g/dL / Pro: 6.4 g/dL / ALK PHOS: 72 U/L / ALT: 9 U/L / AST: 18 U/L / GGT: x           PT/INR - ( 23 Jul 2021 05:26 )   PT: 10.40 sec;   INR: 0.90 ratio         PTT - ( 23 Jul 2021 05:26 )  PTT:29.0 sec    hs Troponin:              CSF:                      EKG:   MICROBIOLOGY:    IMAGING:      Labs, imaging, EKG personally reviewed    RADIOLOGY & ADDITIONAL TESTS: Reviewed.
Patient is a 65y old  Female who presents with a chief complaint of chest pain (23 Jul 2021 02:50)    HPI:  65 year old F with PMHx HTN, DM, CAD s/p 2 stents (pRCA and D1) presenting with chest pain for the past few days. As per the daughter who translated, the patient has been having L sided chest pain that radiated to shoulder and back for the past 2 days. She states that the pain is constant and radiates straight to her back and is worse with exertion. She took 3 ASA 81mg today which did not help the pain. Denied SOB, HA, dizziness, palpitations, THELMA, fever, chills.  Of note, she was planned to have a stress test as outpatient in early august due to recurrent episodes of chest pain, particularly when going up the stairs.    In the ED, CT chest with aorta was done and was unremarkable. She was given aspirin 162 mg x1 in ED. Labs are unremarkable.     Vital Signs Last 24 Hrs  T(C): 36.2 (22 Jul 2021 23:33), Max: 36.2 (22 Jul 2021 23:33)  T(F): 97.1 (22 Jul 2021 23:33), Max: 97.1 (22 Jul 2021 23:33)  HR: 73 (22 Jul 2021 23:33) (73 - 86)  BP: 105/56 (22 Jul 2021 23:33) (105/56 - 153/72)  BP(mean): --  ABP: --  ABP(mean): --  RR: 18 (22 Jul 2021 23:33) (18 - 18)  SpO2: 95% (22 Jul 2021 23:33) (95% - 98%)   (23 Jul 2021 02:50)      SUBJ:  Patient seen and examined. No chest pain. Comfortable. Awaiting stress test.      MEDICATIONS  (STANDING):  aDENosine Injectable (ADENOSCAN) 60 milliGRAM(s) IV Bolus once  aspirin  chewable 81 milliGRAM(s) Oral daily  atorvastatin Oral Tab/Cap - Peds 40 milliGRAM(s) Oral daily  chlorhexidine 4% Liquid 1 Application(s) Topical <User Schedule>  clopidogrel Tablet 75 milliGRAM(s) Oral daily  heparin   Injectable 5000 Unit(s) SubCutaneous every 8 hours  lisinopril 20 milliGRAM(s) Oral daily  metoprolol tartrate 25 milliGRAM(s) Oral two times a day  sodium chloride 0.9%. 1000 milliLiter(s) (70 mL/Hr) IV Continuous <Continuous>    MEDICATIONS  (PRN):  acetaminophen   Tablet .. 650 milliGRAM(s) Oral every 6 hours PRN Temp greater or equal to 38.5C (101.3F), Mild Pain (1 - 3)  morphine  - Injectable 2 milliGRAM(s) IV Push every 4 hours PRN Severe Pain (7 - 10)  nitroglycerin     SubLingual 0.4 milliGRAM(s) SubLingual every 5 minutes PRN Chest Pain  ondansetron Injectable 4 milliGRAM(s) IV Push every 8 hours PRN Nausea and/or Vomiting            Vital Signs Last 24 Hrs  T(C): 35.6 (23 Jul 2021 07:26), Max: 36.2 (22 Jul 2021 23:33)  T(F): 96.1 (23 Jul 2021 07:26), Max: 97.1 (22 Jul 2021 23:33)  HR: 75 (23 Jul 2021 07:26) (73 - 86)  BP: 121/57 (23 Jul 2021 07:26) (105/56 - 153/72)  BP(mean): --  RR: 18 (23 Jul 2021 07:26) (18 - 18)  SpO2: 99% (23 Jul 2021 07:26) (95% - 99%)      PHYSICAL EXAM:    GEN: AAO x 3, NAD  HEENT: NC/AT, PERRL  Neck: No JVD, no bruits  CV: Reg, S1-S2, no murmur  Lungs: CTAB  Abd: Soft, non-tender  Ext: No edema          I&O's Summary  	    TELEMETRY:  NSR    ECG:      < from: 12 Lead ECG (07.23.21 @ 05:06) >   Normal sinus rhythm  Normal ECG      < end of copied text >      LABS:                        10.2   5.41  )-----------( 338      ( 22 Jul 2021 19:03 )             32.2     07-22    140  |  101  |  22<H>  ----------------------------<  136<H>  4.5   |  29  |  1.2    Ca    9.9      22 Jul 2021 19:03    TPro  7.3  /  Alb  4.6  /  TBili  0.2  /  DBili  x   /  AST  20  /  ALT  11  /  AlkPhos  78  07-22    CARDIAC MARKERS ( 23 Jul 2021 05:26 )  x     / <0.01 ng/mL / x     / x     / 1.1 ng/mL  CARDIAC MARKERS ( 22 Jul 2021 19:03 )  x     / <0.01 ng/mL / x     / x     / x          PT/INR - ( 23 Jul 2021 05:26 )   PT: 10.40 sec;   INR: 0.90 ratio         PTT - ( 23 Jul 2021 05:26 )  PTT:29.0 sec      BNP  RADIOLOGY & ADDITIONAL STUDIES:      IMPRESSION AND PLAN:

## 2021-07-23 NOTE — DISCHARGE NOTE PROVIDER - NSDCMRMEDTOKEN_GEN_ALL_CORE_FT
aspirin 81 mg oral tablet: 1 tab(s) orally once a day  atorvastatin 40 mg oral tablet: 1 tab(s) orally once a day  ezetimibe 10 mg oral tablet: 1 tab(s) orally once a day  glimepiride 4 mg oral tablet: 1 tab(s) orally 2 times a day  Janumet 50 mg-1000 mg oral tablet: 1 tab(s) orally 2 times a day  lisinopril 20 mg oral tablet: 1 tab(s) orally once a day  Metoprolol Tartrate 25 mg oral tablet: 1 tab(s) orally 2 times a day  Migraine relief: 1 tab(s) orally prn, As Needed  Plavix 75 mg oral tablet: 1 tab(s) orally once a day  Ranexa 500 mg oral tablet, extended release: 1 tab(s) orally 2 times a day

## 2021-07-23 NOTE — DISCHARGE NOTE PROVIDER - NSDCCPCAREPLAN_GEN_ALL_CORE_FT
PRINCIPAL DISCHARGE DIAGNOSIS  Diagnosis: Chest pain, rule out acute myocardial infarction  Assessment and Plan of Treatment: You came in for chest pain, we checked your ekg twice which was normal. We also checked yout troponins which are cardiac enzymes that are released when the the heart becomes ischemic/damaged and they were normal. We then did a nuclear stress test which was normal as well. Please continue taking your medications including aspirin, plavix, lipitor, lisinopril and metoprolol. Addtionally we will start a medication called Ranexa which is a medication for anginal pain

## 2021-07-23 NOTE — H&P ADULT - ASSESSMENT
65 year old F with PMHx HTN, DM, CAD s/p 2 stents (pRCA and D1) presenting with chest pain for the past few days.     #Chest pain  -serial troponins  -repeat EKG  -TTE  -will need to get stress test vs cath however patient prefers non-invasive testing still  -c/w ASA, plavix, atorvastatin, metoprolol, zetia     65 year old F with PMHx HTN, DM, CAD s/p 2 stents (pRCA and D1) presenting with chest pain for the past few days.     #Unstable angina   -hx of CAD s/p 2 stents  -Trop negative x1, f/u am  -EKG unremarkable  -repeat EKG am  -TTE ordered  -Patient prefers stress test, stress test ordered  -c/w ASA, plavix, atorvastatin, metoprolol, zetia  -Cardio following, may possibly need cath    #HTN  -c/w home meds    #DM  -hold home meds  -monitor FS, if FS >180    #CKD 3  -at baseline  -Due to IV contrast given, will start IVF    #DVT ppx: heparin  #GI ppx: protonix  #Diet: NPO  #Dispo: acute     65 year old F with PMHx HTN, DM, CAD s/p 2 stents (pRCA and D1) presenting with chest pain for the past few days.     #Unstable angina   -hx of CAD s/p 2 stents  -Trop negative x1, f/u am  -EKG unremarkable  -repeat EKG am  -TTE ordered  -Patient prefers stress test, stress test ordered  -c/w ASA, plavix, atorvastatin, metoprolol, zetia  -Cardio following, may possibly need cath    #HTN  -c/w home meds    #DM  -hold home meds  -monitor FS, if FS >180    #CKD 3  -at baseline  -monitor BUN/cr  -Due to IV contrast given, will start IVF    #DVT ppx: heparin  #GI ppx: protonix  #Diet: NPO  #Dispo: acute

## 2021-07-23 NOTE — H&P ADULT - HISTORY OF PRESENT ILLNESS
65 year old F with PMHx HTN, DM, CAD s/p 2 stents (pRCA and D1) presenting with chest pain for the past few days. As per the daughter who translated, the patient has been having L sided chest pain that radiated to shoulder and back for the past 2 days. She states that the pain is constant and radiates straight to her back and is worse with exertion. She took 3 ASA 81mg today which did not help the pain. Denied SOB, HA, dizziness, palpitations, THELMA, fever, chills.     Of note, she was planned to have a stress test as outpatient in early august due to recurrent episodes of chest pain, particularly when going up the stairs.    Vital Signs Last 24 Hrs  T(C): 36.2 (22 Jul 2021 23:33), Max: 36.2 (22 Jul 2021 23:33)  T(F): 97.1 (22 Jul 2021 23:33), Max: 97.1 (22 Jul 2021 23:33)  HR: 73 (22 Jul 2021 23:33) (73 - 86)  BP: 105/56 (22 Jul 2021 23:33) (105/56 - 153/72)  BP(mean): --  ABP: --  ABP(mean): --  RR: 18 (22 Jul 2021 23:33) (18 - 18)  SpO2: 95% (22 Jul 2021 23:33) (95% - 98%)   65 year old F with PMHx HTN, DM, CAD s/p 2 stents (pRCA and D1) presenting with chest pain for the past few days. As per the daughter who translated, the patient has been having L sided chest pain that radiated to shoulder and back for the past 2 days. She states that the pain is constant and radiates straight to her back and is worse with exertion. She took 3 ASA 81mg today which did not help the pain. Denied SOB, HA, dizziness, palpitations, THELMA, fever, chills.  Of note, she was planned to have a stress test as outpatient in early august due to recurrent episodes of chest pain, particularly when going up the stairs.    In the ED, CT chest with aorta was done and was unremarkable. She was given aspirin 162 mg x1 in ED. Labs are unremarkable.     Vital Signs Last 24 Hrs  T(C): 36.2 (22 Jul 2021 23:33), Max: 36.2 (22 Jul 2021 23:33)  T(F): 97.1 (22 Jul 2021 23:33), Max: 97.1 (22 Jul 2021 23:33)  HR: 73 (22 Jul 2021 23:33) (73 - 86)  BP: 105/56 (22 Jul 2021 23:33) (105/56 - 153/72)  BP(mean): --  ABP: --  ABP(mean): --  RR: 18 (22 Jul 2021 23:33) (18 - 18)  SpO2: 95% (22 Jul 2021 23:33) (95% - 98%)

## 2021-07-23 NOTE — H&P ADULT - NSICDXPASTSURGICALHX_GEN_ALL_CORE_FT
PAST SURGICAL HISTORY:  H/O coronary angioplasty with 2 stents    S/p bilateral carpal tunnel release     S/P excision of lipoma shoulder

## 2021-07-23 NOTE — PROGRESS NOTE ADULT - ASSESSMENT
65F PMHx HTN, DM2, CAD s/p stent x2 here with chest pain.    #Chest pain, described as L sided +L shoulder pain  nonradiating, started 3 days prior, lasts from 5 min to few hours; unchanged with exertion  ce neg  ekg unremarkable  in setting of cad s/p 2 stent to rca, d1  check nst  appreciate cards  asa, plavix  lipitor 40  #Pancreatic lesion  incidentally noted on ct; c/w IPMN  outpt mri, gi f/u  #HTN  lisinopril 20  lopressor 25 bid  #DM2  controlled off medications  #DVT ppx  subq hep    #Progress Note Handoff:  Pending (specify):  Consults_________, Tests________, Test Results_______, Other_______nst__  Family discussion: d/w pt at bedside re: treatment plan, primary dx  Disposition: Home_x__/SNF___/Other________/Unknown at this time________      
No aortic dissection on CT  Stress test pending - scheduled for today  C/w ASA, statin, BB, ACE-I  Usual prophylaxis measures.  If abnormal stress test, will need cath. If normal - c/w medical therapy. Consider addition of Ranexa for symptom control.  Work-up of the pancreatic lesion seen on CT as per primary team. Consider MRI, GI eval.  No contraindication to MRI in terms of her stents - (non-ferromagnetic alloy)

## 2021-07-23 NOTE — H&P ADULT - NSHPLABSRESULTS_GEN_ALL_CORE
10.2   5.41  )-----------( 338      ( 22 Jul 2021 19:03 )             32.2       07-22    140  |  101  |  22<H>  ----------------------------<  136<H>  4.5   |  29  |  1.2    Ca    9.9      22 Jul 2021 19:03    TPro  7.3  /  Alb  4.6  /  TBili  0.2  /  DBili  x   /  AST  20  /  ALT  11  /  AlkPhos  78  07-22                      Lactate Trend      CARDIAC MARKERS ( 22 Jul 2021 19:03 )  x     / <0.01 ng/mL / x     / x     / x            CAPILLARY BLOOD GLUCOSE 10.2   5.41  )-----------( 338      ( 22 Jul 2021 19:03 )             32.2       07-22    140  |  101  |  22<H>  ----------------------------<  136<H>  4.5   |  29  |  1.2    Ca    9.9      22 Jul 2021 19:03    TPro  7.3  /  Alb  4.6  /  TBili  0.2  /  DBili  x   /  AST  20  /  ALT  11  /  AlkPhos  78  07-22                      Lactate Trend      CARDIAC MARKERS ( 22 Jul 2021 19:03 )  x     / <0.01 ng/mL / x     / x     / x            CAPILLARY BLOOD GLUCOSE    < from: CT Angio Abd Aorta w/run-off w/ IV Cont (07.22.21 @ 22:41) >    IMPRESSION:    1. No evidence of acute aortic, acute intrathoracic, or acute intra-abdominal pathology.    2. Pancreatic uncinate process 1 cm hypodensity, possible side branch IPMN. Outpatient MR abdomen with and without IV contrast recommended following discharge from hospital.    --- End of Report ---          < end of copied text >

## 2021-07-23 NOTE — H&P ADULT - NSHPPHYSICALEXAM_GEN_ALL_CORE
GENERAL: NAD, speaks in full sentences, no signs of respiratory distress  HEAD:  Atraumatic, Normocephalic  EYES: EOMI, PERRLA, anicteric sclera  NECK: Supple, No JVD  CHEST/LUNG: Clear to auscultation bilaterally; No wheeze; No crackles; No accessory muscles used  HEART: Regular rate and rhythm; No murmurs;   ABDOMEN: Soft, Nontender, Nondistended; Bowel sounds present; No guarding  EXTREMITIES:  2+ Peripheral Pulses, No cyanosis or edema  PSYCH: AAOx3  NEUROLOGY: non-focal  SKIN: No rashes or lesions GENERAL: NAD, speaks in full sentences, no signs of respiratory distress  HEAD:  Atraumatic, Normocephalic  EYES: EOMI, PERRLA  NECK: Supple  CHEST/LUNG: Clear to auscultation bilaterally; No wheeze; No crackles; No accessory muscles used  HEART: Regular rate and rhythm; No murmurs;   ABDOMEN: Soft, Nontender, Nondistended; Bowel sounds present; No guarding  EXTREMITIES:  No cyanosis or edema  NEUROLOGY: non-focal  SKIN: No rashes or lesions

## 2021-07-23 NOTE — DISCHARGE NOTE PROVIDER - NSDCFUADDINST_GEN_ALL_CORE_FT
Please f/u w/ your PCP for a referral to get an MRI of your abdomen as there was a small hypodense mass noted on your pancreas on CT

## 2021-07-23 NOTE — DISCHARGE NOTE PROVIDER - PROVIDER TOKENS
PROVIDER:[TOKEN:[[id number]],FOLLOWUP:[2 weeks]],PROVIDER:[TOKEN:[[id number]],FOLLOWUP:[1 week]]

## 2021-07-23 NOTE — DISCHARGE NOTE PROVIDER - HOSPITAL COURSE
HPI:  65 year old F with PMHx HTN, DM, CAD s/p 2 stents (pRCA and D1) presenting with chest pain for the past few days. As per the daughter who translated, the patient has been having L sided chest pain that radiated to shoulder and back for the past 2 days. She states that the pain is constant and radiates straight to her back and is worse with exertion. She took 3 ASA 81mg today which did not help the pain. Denied SOB, HA, dizziness, palpitations, THELMA, fever, chills.  Of note, she was planned to have a stress test as outpatient in early august due to recurrent episodes of chest pain, particularly when going up the stairs.    In the ED, CT chest with aorta was done and was unremarkable. She was given aspirin 162 mg x1 in ED. Labs are unremarkable.     Hospital course: admitted for CP, trops neg x2, EKG NSR x 2, Stress test performed did not show any reversible defects, will start on ranexa. Safe and stable for d/c

## 2021-07-24 LAB
COVID-19 SPIKE DOMAIN AB INTERP: POSITIVE
COVID-19 SPIKE DOMAIN ANTIBODY RESULT: >250 U/ML — HIGH
SARS-COV-2 IGG+IGM SERPL QL IA: >250 U/ML — HIGH
SARS-COV-2 IGG+IGM SERPL QL IA: POSITIVE

## 2021-07-26 ENCOUNTER — APPOINTMENT (OUTPATIENT)
Dept: CARDIOLOGY | Facility: CLINIC | Age: 66
End: 2021-07-26

## 2021-08-05 DIAGNOSIS — I12.9 HYPERTENSIVE CHRONIC KIDNEY DISEASE WITH STAGE 1 THROUGH STAGE 4 CHRONIC KIDNEY DISEASE, OR UNSPECIFIED CHRONIC KIDNEY DISEASE: ICD-10-CM

## 2021-08-05 DIAGNOSIS — N18.30 CHRONIC KIDNEY DISEASE, STAGE 3 UNSPECIFIED: ICD-10-CM

## 2021-08-05 DIAGNOSIS — K86.9 DISEASE OF PANCREAS, UNSPECIFIED: ICD-10-CM

## 2021-08-05 DIAGNOSIS — Z79.02 LONG TERM (CURRENT) USE OF ANTITHROMBOTICS/ANTIPLATELETS: ICD-10-CM

## 2021-08-05 DIAGNOSIS — Z79.82 LONG TERM (CURRENT) USE OF ASPIRIN: ICD-10-CM

## 2021-08-05 DIAGNOSIS — I25.2 OLD MYOCARDIAL INFARCTION: ICD-10-CM

## 2021-08-05 DIAGNOSIS — D64.9 ANEMIA, UNSPECIFIED: ICD-10-CM

## 2021-08-05 DIAGNOSIS — E11.22 TYPE 2 DIABETES MELLITUS WITH DIABETIC CHRONIC KIDNEY DISEASE: ICD-10-CM

## 2021-08-05 DIAGNOSIS — I25.110 ATHEROSCLEROTIC HEART DISEASE OF NATIVE CORONARY ARTERY WITH UNSTABLE ANGINA PECTORIS: ICD-10-CM

## 2021-08-05 DIAGNOSIS — R07.89 OTHER CHEST PAIN: ICD-10-CM

## 2021-08-05 DIAGNOSIS — Z95.5 PRESENCE OF CORONARY ANGIOPLASTY IMPLANT AND GRAFT: ICD-10-CM

## 2021-10-01 ENCOUNTER — APPOINTMENT (OUTPATIENT)
Dept: ORTHOPEDIC SURGERY | Facility: CLINIC | Age: 66
End: 2021-10-01
Payer: MEDICARE

## 2021-10-01 ENCOUNTER — NON-APPOINTMENT (OUTPATIENT)
Age: 66
End: 2021-10-01

## 2021-10-01 DIAGNOSIS — M17.11 UNILATERAL PRIMARY OSTEOARTHRITIS, RIGHT KNEE: ICD-10-CM

## 2021-10-01 DIAGNOSIS — M25.562 PAIN IN LEFT KNEE: ICD-10-CM

## 2021-10-01 DIAGNOSIS — M17.12 UNILATERAL PRIMARY OSTEOARTHRITIS, LEFT KNEE: ICD-10-CM

## 2021-10-01 PROCEDURE — 73564 X-RAY EXAM KNEE 4 OR MORE: CPT | Mod: RT

## 2021-10-01 PROCEDURE — 99204 OFFICE O/P NEW MOD 45 MIN: CPT

## 2021-10-01 RX ORDER — RANOLAZINE 500 MG/1
500 TABLET, EXTENDED RELEASE ORAL
Refills: 0 | Status: ACTIVE | COMMUNITY

## 2021-10-01 RX ORDER — LISINOPRIL 20 MG/1
20 TABLET ORAL
Refills: 0 | Status: ACTIVE | COMMUNITY

## 2021-10-01 RX ORDER — DICLOFENAC SODIUM 1% 10 MG/G
1 GEL TOPICAL
Qty: 1 | Refills: 5 | Status: ACTIVE | COMMUNITY
Start: 2021-10-01 | End: 1900-01-01

## 2021-10-01 RX ORDER — GLIMEPIRIDE 4 MG/1
4 TABLET ORAL TWICE DAILY
Refills: 0 | Status: DISCONTINUED | COMMUNITY
End: 2021-10-01

## 2021-10-01 NOTE — ASSESSMENT
[FreeTextEntry1] : 65 year old female presents to the office for bilateral knee pain, left worse than the right. She has had a left knee arthroscopy in the past. She has tried OTC NSAIDs, as well as Gabapentin and methocarbamol. She is not interested in any type of injection. We therefore recommended Voltaren gel and PT. She can f/u PRN

## 2021-10-01 NOTE — ED PROVIDER NOTE - ENMT NEGATIVE STATEMENT, MLM
Ears: no ear pain and no hearing problems.Nose: no nasal congestion and no nasal drainage.Mouth/Throat: no dysphagia, no hoarseness and no throat pain.Neck: no lumps, no pain, no stiffness and no swollen glands. Preparation Of Recipient Site - Flap: The eschar was removed surgically with sharp dissection to facilitate appropriate wound healing of the following adjacent tissue rearrangement.

## 2021-10-01 NOTE — PHYSICAL EXAM
[de-identified] : General appearance: well nourished and hydrated, pleasant, alert and oriented x 3, cooperative.\par Cardiovascular: no apparent abnormalities, no lower leg edema, no varicosities, pedal pulses are palpable.\par Neurologic: sensation is normal, no muscle weakness in upper or lower extremities\par Dermatologic no apparent skin lesions, moist, warm, no rash.\par Gait: nonantalgic.\par \par Left knee\par Inspection: no effusion or erythema.\par Wounds: none.\par Alignment: normal.\par Palpation: medial joint line tenderness  lateral joint line tenderness \par ROM:PF crepitus, 0-110 \par Ligamentous laxity: all ligaments appear stable\par Meniscal Test: negative McMurrays\par Muscle Test: good quad strength.\par \par Right knee\par Inspection: no effusion or erythema.\par Wounds: none.\par Alignment: normal.\par Palpation: no specific tenderness on palpation.\par ROM: 0-110\par Ligamentous laxity: all ligaments appear stable\par Meniscal Test: negative McMurrays.\par Muscle Test: good quad strength.\par \par Left hip\par Inspection: No swelling or ecchymosis.\par Wounds: none.\par Palpation: non-tender.\par Stability: no instability.\par Strength: 5/5 all motor groups.\par ROM: no pain with FROM.\par Leg length: equal.\par \par Right hip\par Inspection: No swelling or ecchymosis.\par Wounds: none.\par Palpation: non-tender.\par Stability: no instability.\par Strength: 5/5 all motor groups.\par ROM: no pain with FROM.\par Leg length: equal.\par  [de-identified] : Radiographs done today AP lateral and skyline of both knees shows modertae medial joint space narrowing of both knees.

## 2021-10-01 NOTE — HISTORY OF PRESENT ILLNESS
[de-identified] : 65 year old female presents to the office today complaining of bilateral knee pain, left knee more painful than right. Patient reports pain that is achy and pulling in nature. She reports a sensation of feeling like something is going to ear. Patient reports locking and clicking. She ambulates with a cane occasionally and states she can only ambulate about one block before pain stops her. There is pain and difficulty with negotiating stairs, worse with ascending. Patient reports difficulty with standing from a seated position. Patient reports taking Tylenol, Advil, and OTC NSAIDs for pain with only minimal relief. She reports having taken Gabapentin and Methocarbamol i the past with good relief. Patient reports having a history of left knee arthroscopy. Patient is here today to discuss her next options for pain relief.

## 2022-03-28 ENCOUNTER — APPOINTMENT (OUTPATIENT)
Dept: CARDIOLOGY | Facility: CLINIC | Age: 67
End: 2022-03-28
Payer: MEDICARE

## 2022-03-28 VITALS
HEIGHT: 65 IN | TEMPERATURE: 97.5 F | DIASTOLIC BLOOD PRESSURE: 70 MMHG | HEART RATE: 74 BPM | SYSTOLIC BLOOD PRESSURE: 150 MMHG | WEIGHT: 199 LBS | BODY MASS INDEX: 33.15 KG/M2

## 2022-03-28 PROCEDURE — 93000 ELECTROCARDIOGRAM COMPLETE: CPT

## 2022-03-28 PROCEDURE — 99213 OFFICE O/P EST LOW 20 MIN: CPT

## 2022-03-28 NOTE — PHYSICAL EXAM
[Normal Appearance] : normal appearance [General Appearance - Well Developed] : well developed [General Appearance - Well Nourished] : well nourished [General Appearance - In No Acute Distress] : no acute distress [Normal Conjunctiva] : the conjunctiva exhibited no abnormalities [] : no respiratory distress [FreeTextEntry1] : no JVD, no bruits [Respiration, Rhythm And Depth] : normal respiratory rhythm and effort [Auscultation Breath Sounds / Voice Sounds] : lungs were clear to auscultation bilaterally [Heart Rate And Rhythm] : heart rate and rhythm were normal [Heart Sounds] : normal S1 and S2 [Murmurs] : no murmurs present [Arterial Pulses Normal] : the arterial pulses were normal [Bowel Sounds] : normal bowel sounds [Edema] : no peripheral edema present [Abdomen Soft] : soft [Abnormal Walk] : normal gait [Abdomen Tenderness] : non-tender [Nail Clubbing] : no clubbing of the fingernails [Cyanosis, Localized] : no localized cyanosis [Skin Color & Pigmentation] : normal skin color and pigmentation [Oriented To Time, Place, And Person] : oriented to person, place, and time [Affect] : the affect was normal

## 2022-03-28 NOTE — HISTORY OF PRESENT ILLNESS
[FreeTextEntry1] : 65 y/o female with history of DM, HTN, DL, CAD, s/p STEMI, PCI or RCA, subsequent PCI of D1, presents for f/u.  She reports compliance with medical therapy.  LV function was preserved. Last labs noted - elevated HgA1c, TG. She had two episodes of recurrent chest pain. No chest pain today. No dyspnea. BP is controlled.

## 2022-03-28 NOTE — ASSESSMENT
[FreeTextEntry1] : CAD, s/p PCI.\par Dyslipidemia, diabetes.\par C/w medical therapy.\par Diet, weight loss discussed. Continued wt. loss encouraged.\par If stable will continue with secondary prevention, DM control.\par Patient advised to continue with her current DM regimen and to see her PMD to repeat labs\par She reports her last HgA1c is controlled.\par \par \par \par F/u in 6 months.

## 2022-05-18 ENCOUNTER — EMERGENCY (EMERGENCY)
Facility: HOSPITAL | Age: 67
LOS: 0 days | Discharge: HOME | End: 2022-05-18
Attending: EMERGENCY MEDICINE | Admitting: EMERGENCY MEDICINE
Payer: MEDICARE

## 2022-05-18 VITALS
RESPIRATION RATE: 20 BRPM | TEMPERATURE: 98 F | HEART RATE: 85 BPM | HEIGHT: 66 IN | DIASTOLIC BLOOD PRESSURE: 90 MMHG | SYSTOLIC BLOOD PRESSURE: 163 MMHG | OXYGEN SATURATION: 98 %

## 2022-05-18 DIAGNOSIS — M54.9 DORSALGIA, UNSPECIFIED: ICD-10-CM

## 2022-05-18 DIAGNOSIS — I25.10 ATHEROSCLEROTIC HEART DISEASE OF NATIVE CORONARY ARTERY WITHOUT ANGINA PECTORIS: ICD-10-CM

## 2022-05-18 DIAGNOSIS — E78.5 HYPERLIPIDEMIA, UNSPECIFIED: ICD-10-CM

## 2022-05-18 DIAGNOSIS — Z98.890 OTHER SPECIFIED POSTPROCEDURAL STATES: Chronic | ICD-10-CM

## 2022-05-18 DIAGNOSIS — I10 ESSENTIAL (PRIMARY) HYPERTENSION: ICD-10-CM

## 2022-05-18 DIAGNOSIS — Z79.02 LONG TERM (CURRENT) USE OF ANTITHROMBOTICS/ANTIPLATELETS: ICD-10-CM

## 2022-05-18 DIAGNOSIS — Z79.82 LONG TERM (CURRENT) USE OF ASPIRIN: ICD-10-CM

## 2022-05-18 DIAGNOSIS — E11.9 TYPE 2 DIABETES MELLITUS WITHOUT COMPLICATIONS: ICD-10-CM

## 2022-05-18 DIAGNOSIS — Z98.61 CORONARY ANGIOPLASTY STATUS: Chronic | ICD-10-CM

## 2022-05-18 DIAGNOSIS — I21.3 ST ELEVATION (STEMI) MYOCARDIAL INFARCTION OF UNSPECIFIED SITE: ICD-10-CM

## 2022-05-18 DIAGNOSIS — B02.9 ZOSTER WITHOUT COMPLICATIONS: ICD-10-CM

## 2022-05-18 DIAGNOSIS — Z95.5 PRESENCE OF CORONARY ANGIOPLASTY IMPLANT AND GRAFT: ICD-10-CM

## 2022-05-18 PROCEDURE — 99284 EMERGENCY DEPT VISIT MOD MDM: CPT

## 2022-05-18 RX ORDER — LIDOCAINE 4 G/100G
1 CREAM TOPICAL
Qty: 10 | Refills: 0
Start: 2022-05-18 | End: 2022-05-27

## 2022-05-18 RX ORDER — VALACYCLOVIR 500 MG/1
1 TABLET, FILM COATED ORAL
Qty: 21 | Refills: 0
Start: 2022-05-18 | End: 2022-05-24

## 2022-05-18 RX ORDER — KETOROLAC TROMETHAMINE 30 MG/ML
30 SYRINGE (ML) INJECTION ONCE
Refills: 0 | Status: DISCONTINUED | OUTPATIENT
Start: 2022-05-18 | End: 2022-05-18

## 2022-05-18 RX ORDER — LIDOCAINE 4 G/100G
1 CREAM TOPICAL ONCE
Refills: 0 | Status: COMPLETED | OUTPATIENT
Start: 2022-05-18 | End: 2022-05-18

## 2022-05-18 RX ORDER — OXYCODONE AND ACETAMINOPHEN 5; 325 MG/1; MG/1
1 TABLET ORAL EVERY 4 HOURS
Refills: 0 | Status: DISCONTINUED | OUTPATIENT
Start: 2022-05-18 | End: 2022-05-18

## 2022-05-18 RX ORDER — OXYCODONE AND ACETAMINOPHEN 5; 325 MG/1; MG/1
1 TABLET ORAL
Qty: 8 | Refills: 0
Start: 2022-05-18 | End: 2022-05-19

## 2022-05-18 RX ADMIN — OXYCODONE AND ACETAMINOPHEN 1 TABLET(S): 5; 325 TABLET ORAL at 08:17

## 2022-05-18 RX ADMIN — LIDOCAINE 1 PATCH: 4 CREAM TOPICAL at 08:17

## 2022-05-18 RX ADMIN — Medication 30 MILLIGRAM(S): at 07:44

## 2022-05-18 NOTE — ED PROVIDER NOTE - ATTENDING CONTRIBUTION TO CARE
66-year-old female history of diabetes, hypertension, CAD, now with left-sided mid back pain.  Not related to movement.  Sharp.  X2 days.  No fever.    On exam vital stable, in pain, no C–T–L-spine tenderness no focal neurodeficits positive cluster rash to the T6 dermatome no vesicles yet.  Supportive care.  Antivirals.  Pain management

## 2022-05-18 NOTE — ED PROVIDER NOTE - NS ED ATTENDING STATEMENT MOD
I have seen and examined this patient and fully participated in the care of this patient as the teaching attending.  The service was shared with the LEVI.  I reviewed and verified the documentation and independently performed the documented:

## 2022-05-18 NOTE — ED PROVIDER NOTE - PATIENT PORTAL LINK FT
You can access the FollowMyHealth Patient Portal offered by [facility] by registering at the following website: [email or web address]. By joining [facility]’s FollowMyHealth portal, you will also be able to view your health information using other applications (apps) compatible with our system.

## 2022-05-18 NOTE — ED PROVIDER NOTE - OBJECTIVE STATEMENT
67 y/o female with a PMH of DM, HTN, HLD, and CAD with two stents followed by Dr. Monroy presents to  ED for evaluation of left upper back pain that began yesterday. pt reports she is taking muscle relaxers without relief. pt reports hx of chicken pox. pt denies fever, chills, recent trauma, recent surgeries, heavy lifting, chest pain, sob abdominal pain, n/v/d/c, weakness, numbness, tingling, burning or pain with urination, or blood in the urine.

## 2022-05-18 NOTE — ED ADULT TRIAGE NOTE - CHIEF COMPLAINT QUOTE
Pt. complains of lower back pain that began on Sunday evening. Pt. states that she took muscle relaxer and gabapentin last night, however pain is worse this morning.

## 2022-05-18 NOTE — ED PROVIDER NOTE - PHYSICAL EXAMINATION
Physical Exam    Vital Signs: I have reviewed the initial vital signs.  Constitutional: well-nourished, appears stated age, no acute distress  Eyes: Conjunctiva pink, Sclera clear, PERRLA, EOMI without pain.   Cardiovascular: S1 and S2, regular rate, regular rhythm, well-perfused extremities, radial and pedal pulses equal and 2+ b/l.   Respiratory: unlabored respiratory effort, clear to auscultation bilaterally no wheezing, rales and rhonchi. pt is speaking full sentences. no accessory muscle use.   Gastrointestinal: soft, non-tender, nondistended abdomen, no pulsatile mass, normal bowl sounds, no rebound, no guarding  Musculoskeletal: supple neck, no lower extremity edema, no calf tenderness, no midline tenderness, no palpable spinal step offs. (+) left upper reproducible back tenderness, with grouped raised erythematous macules without pustules to the left upper back not crossing the midline.    Integumentary: warm, dry, no rash  Neurologic: awake, alert, cranial nerves II-XII grossly intact, extremities’ motor and sensory functions grossly intact. finger to nose intact. steady gait.   Psychiatric: appropriate mood, appropriate affect

## 2022-05-18 NOTE — ED PROVIDER NOTE - NS ED ROS FT
CONST: No fever, chills or bodyaches  EYES: No pain, redness, drainage or visual changes.  ENT: No ear pain or discharge, nasal discharge or congestion. No sore throat  CARD: No chest pain, palpitations  RESP: No SOB, cough, hemoptysis. No hx of asthma or COPD  GI: No abdominal pain, N/V/D  : No urinary symptoms  MS: No joint pain, extremity pain/injury. (+) left upper back pain  SKIN: No rashes  NEURO: No headache, dizziness, paresthesias or LOC

## 2022-07-25 ENCOUNTER — APPOINTMENT (OUTPATIENT)
Dept: CARDIOLOGY | Facility: CLINIC | Age: 67
End: 2022-07-25

## 2022-08-08 ENCOUNTER — APPOINTMENT (OUTPATIENT)
Dept: CARDIOLOGY | Facility: CLINIC | Age: 67
End: 2022-08-08

## 2022-12-19 ENCOUNTER — RESULT CHARGE (OUTPATIENT)
Age: 67
End: 2022-12-19

## 2022-12-19 ENCOUNTER — APPOINTMENT (OUTPATIENT)
Dept: CARDIOLOGY | Facility: CLINIC | Age: 67
End: 2022-12-19

## 2023-03-20 ENCOUNTER — APPOINTMENT (OUTPATIENT)
Dept: CARDIOLOGY | Facility: CLINIC | Age: 68
End: 2023-03-20
Payer: MEDICARE

## 2023-03-20 ENCOUNTER — RESULT CHARGE (OUTPATIENT)
Age: 68
End: 2023-03-20

## 2023-03-20 VITALS
DIASTOLIC BLOOD PRESSURE: 64 MMHG | HEART RATE: 82 BPM | HEIGHT: 65 IN | BODY MASS INDEX: 33.66 KG/M2 | WEIGHT: 202 LBS | SYSTOLIC BLOOD PRESSURE: 128 MMHG

## 2023-03-20 DIAGNOSIS — I10 ESSENTIAL (PRIMARY) HYPERTENSION: ICD-10-CM

## 2023-03-20 PROCEDURE — 93000 ELECTROCARDIOGRAM COMPLETE: CPT

## 2023-03-20 PROCEDURE — 99213 OFFICE O/P EST LOW 20 MIN: CPT | Mod: 25

## 2023-03-20 NOTE — PHYSICAL EXAM
[General Appearance - Well Developed] : well developed [Normal Appearance] : normal appearance [General Appearance - Well Nourished] : well nourished [General Appearance - In No Acute Distress] : no acute distress [Normal Conjunctiva] : the conjunctiva exhibited no abnormalities [] : no respiratory distress [Respiration, Rhythm And Depth] : normal respiratory rhythm and effort [Auscultation Breath Sounds / Voice Sounds] : lungs were clear to auscultation bilaterally [Heart Rate And Rhythm] : heart rate and rhythm were normal [Heart Sounds] : normal S1 and S2 [Murmurs] : no murmurs present [Arterial Pulses Normal] : the arterial pulses were normal [Edema] : no peripheral edema present [Bowel Sounds] : normal bowel sounds [Abdomen Soft] : soft [Abdomen Tenderness] : non-tender [Abnormal Walk] : normal gait [Nail Clubbing] : no clubbing of the fingernails [Cyanosis, Localized] : no localized cyanosis [Skin Color & Pigmentation] : normal skin color and pigmentation [Oriented To Time, Place, And Person] : oriented to person, place, and time [Affect] : the affect was normal [FreeTextEntry1] : no JVD, no bruits

## 2023-03-20 NOTE — ASSESSMENT
[FreeTextEntry1] : CAD, s/p PCI.\par Dyslipidemia, diabetes.\par C/w medical therapy.\par Diet, weight loss discussed. Continued wt. loss encouraged.\par If stable will continue with secondary prevention, DM control.\par Patient advised to continue with her current DM regimen and to see her PMD to repeat labs\par She reports her last HgA1c is controlled.\par If recurrent palpitations, will get MCOT\par \par \par \par F/u in 6 months.

## 2023-03-20 NOTE — HISTORY OF PRESENT ILLNESS
[FreeTextEntry1] : 66 y/o female with history of DM, HTN, DL, CAD, s/p STEMI, PCI or RCA, subsequent PCI of D1, presents for f/u.  She reports compliance with medical therapy.  LV function was preserved.  Pt denies chest pain, no SOB, no dizziness, no palpitations.  Pt did not wear an even monitor that was ordered 1 year ago, no further palpitations.  Labs are monitored at PCP office. B/P is controlled

## 2023-03-24 ENCOUNTER — APPOINTMENT (OUTPATIENT)
Dept: CARDIOLOGY | Facility: CLINIC | Age: 68
End: 2023-03-24

## 2023-04-24 ENCOUNTER — EMERGENCY (EMERGENCY)
Facility: HOSPITAL | Age: 68
LOS: 0 days | Discharge: HOME | End: 2023-04-25
Attending: EMERGENCY MEDICINE
Payer: MEDICARE

## 2023-04-24 VITALS
SYSTOLIC BLOOD PRESSURE: 150 MMHG | WEIGHT: 192.02 LBS | HEART RATE: 85 BPM | OXYGEN SATURATION: 95 % | TEMPERATURE: 99 F | RESPIRATION RATE: 18 BRPM | DIASTOLIC BLOOD PRESSURE: 79 MMHG | HEIGHT: 65 IN

## 2023-04-24 DIAGNOSIS — Z98.890 OTHER SPECIFIED POSTPROCEDURAL STATES: Chronic | ICD-10-CM

## 2023-04-24 DIAGNOSIS — I10 ESSENTIAL (PRIMARY) HYPERTENSION: ICD-10-CM

## 2023-04-24 DIAGNOSIS — Z98.890 OTHER SPECIFIED POSTPROCEDURAL STATES: ICD-10-CM

## 2023-04-24 DIAGNOSIS — Z98.61 CORONARY ANGIOPLASTY STATUS: Chronic | ICD-10-CM

## 2023-04-24 DIAGNOSIS — Z79.84 LONG TERM (CURRENT) USE OF ORAL HYPOGLYCEMIC DRUGS: ICD-10-CM

## 2023-04-24 DIAGNOSIS — I25.2 OLD MYOCARDIAL INFARCTION: ICD-10-CM

## 2023-04-24 DIAGNOSIS — I25.10 ATHEROSCLEROTIC HEART DISEASE OF NATIVE CORONARY ARTERY WITHOUT ANGINA PECTORIS: ICD-10-CM

## 2023-04-24 DIAGNOSIS — Z95.5 PRESENCE OF CORONARY ANGIOPLASTY IMPLANT AND GRAFT: ICD-10-CM

## 2023-04-24 DIAGNOSIS — R10.10 UPPER ABDOMINAL PAIN, UNSPECIFIED: ICD-10-CM

## 2023-04-24 DIAGNOSIS — K29.80 DUODENITIS WITHOUT BLEEDING: ICD-10-CM

## 2023-04-24 DIAGNOSIS — E11.9 TYPE 2 DIABETES MELLITUS WITHOUT COMPLICATIONS: ICD-10-CM

## 2023-04-24 DIAGNOSIS — Z79.82 LONG TERM (CURRENT) USE OF ASPIRIN: ICD-10-CM

## 2023-04-24 DIAGNOSIS — R11.0 NAUSEA: ICD-10-CM

## 2023-04-24 PROCEDURE — 85025 COMPLETE CBC W/AUTO DIFF WBC: CPT

## 2023-04-24 PROCEDURE — 74177 CT ABD & PELVIS W/CONTRAST: CPT | Mod: MA

## 2023-04-24 PROCEDURE — 93005 ELECTROCARDIOGRAM TRACING: CPT

## 2023-04-24 PROCEDURE — 99285 EMERGENCY DEPT VISIT HI MDM: CPT | Mod: 25

## 2023-04-24 PROCEDURE — 81001 URINALYSIS AUTO W/SCOPE: CPT

## 2023-04-24 PROCEDURE — 83690 ASSAY OF LIPASE: CPT

## 2023-04-24 PROCEDURE — 87186 SC STD MICRODIL/AGAR DIL: CPT

## 2023-04-24 PROCEDURE — 80053 COMPREHEN METABOLIC PANEL: CPT

## 2023-04-24 PROCEDURE — 99285 EMERGENCY DEPT VISIT HI MDM: CPT

## 2023-04-24 PROCEDURE — 87086 URINE CULTURE/COLONY COUNT: CPT

## 2023-04-24 PROCEDURE — 76705 ECHO EXAM OF ABDOMEN: CPT

## 2023-04-24 PROCEDURE — 84484 ASSAY OF TROPONIN QUANT: CPT

## 2023-04-24 PROCEDURE — 96374 THER/PROPH/DIAG INJ IV PUSH: CPT | Mod: XU

## 2023-04-24 PROCEDURE — 96375 TX/PRO/DX INJ NEW DRUG ADDON: CPT

## 2023-04-24 PROCEDURE — 36415 COLL VENOUS BLD VENIPUNCTURE: CPT

## 2023-04-24 PROCEDURE — 71046 X-RAY EXAM CHEST 2 VIEWS: CPT

## 2023-04-24 RX ORDER — MORPHINE SULFATE 50 MG/1
4 CAPSULE, EXTENDED RELEASE ORAL ONCE
Refills: 0 | Status: DISCONTINUED | OUTPATIENT
Start: 2023-04-24 | End: 2023-04-24

## 2023-04-24 RX ORDER — KETOROLAC TROMETHAMINE 30 MG/ML
15 SYRINGE (ML) INJECTION ONCE
Refills: 0 | Status: DISCONTINUED | OUTPATIENT
Start: 2023-04-24 | End: 2023-04-24

## 2023-04-24 RX ORDER — ONDANSETRON 8 MG/1
4 TABLET, FILM COATED ORAL ONCE
Refills: 0 | Status: DISCONTINUED | OUTPATIENT
Start: 2023-04-24 | End: 2023-04-25

## 2023-04-24 RX ORDER — FAMOTIDINE 10 MG/ML
20 INJECTION INTRAVENOUS ONCE
Refills: 0 | Status: COMPLETED | OUTPATIENT
Start: 2023-04-24 | End: 2023-04-24

## 2023-04-24 RX ORDER — SODIUM CHLORIDE 9 MG/ML
1000 INJECTION INTRAMUSCULAR; INTRAVENOUS; SUBCUTANEOUS ONCE
Refills: 0 | Status: COMPLETED | OUTPATIENT
Start: 2023-04-24 | End: 2023-04-24

## 2023-04-24 RX ADMIN — MORPHINE SULFATE 4 MILLIGRAM(S): 50 CAPSULE, EXTENDED RELEASE ORAL at 23:21

## 2023-04-24 RX ADMIN — FAMOTIDINE 20 MILLIGRAM(S): 10 INJECTION INTRAVENOUS at 23:30

## 2023-04-24 RX ADMIN — SODIUM CHLORIDE 1000 MILLILITER(S): 9 INJECTION INTRAMUSCULAR; INTRAVENOUS; SUBCUTANEOUS at 23:30

## 2023-04-24 NOTE — ED PROVIDER NOTE - PROGRESS NOTE DETAILS
Reexam abdomen.  Nontender.  Tolerating p.o.  Will discharge and follow-up with GI as outpatient.  CT finding noted.  Patient has no vomiting and lipase levels normal.  Unlikely pancreatitis.  Patient most likely has duodenitis secondary to duodenal wall thickening on CT.

## 2023-04-24 NOTE — ED PROVIDER NOTE - ATTENDING APP SHARED VISIT CONTRIBUTION OF CARE
Past medical history of hypertension diabetes and CAD with stent 4-day history of right-sided epigastric abdominal pain intermittent but worsened today associated with eating but recurs after 15-20 minutes from eating.  Associated with nausea but no vomiting.  Normal bowel movements no urinary complaints.  Denies any chest pain. Exam shows that there is tenderness in the right upper quadrant epigastric area no rebound or guarding

## 2023-04-24 NOTE — ED PROVIDER NOTE - PATIENT PORTAL LINK FT
You can access the FollowMyHealth Patient Portal offered by [facility] by registering at the following website: [email or web address]. By joining [facility]’s FollowMyHealth portal, you will also be able to view your health information using other applications (apps) compatible with our system.

## 2023-04-24 NOTE — ED PROVIDER NOTE - OBJECTIVE STATEMENT
67 years old female history of hypertension, diabetes, CAD status post stent presenting complaint upper abdominal pain radiating to her back for about 4 days.  Feel more pain tonight so she comes to ED for evaluation.  Pain associated with mild nausea with no vomiting.  Denies similar pain in the past.  Further denies fever/chill/vomiting/diarrhea/constipation and urinary sxs/black or bloody stool. Denies HA/dizziness/chest pain/sob/palpitations.

## 2023-04-24 NOTE — ED PROVIDER NOTE - CLINICAL SUMMARY MEDICAL DECISION MAKING FREE TEXT BOX
patient evaluated for abd pain, ct scan showed duodenitis, lab work was nml. pain controled. patient tolerated po and repeat abd exam non tender on reevaluation. Patient to be discharged from ED. Any available test results were discussed with patient and/or family. Verbal instructions given, including instructions to return to ED immediately for any new, worsening, or concerning symptoms. Patient endorsed understanding. Written discharge instructions additionally given, including follow-up plan.

## 2023-04-24 NOTE — ED PROVIDER NOTE - NSFOLLOWUPINSTRUCTIONS_ED_ALL_ED_FT
Duodenitis    Body outline showing the stomach and the small intestine.  Duodenitis is inflammation of the lining of the first part of the small intestine (duodenum). It is commonly caused by an infection from bacteria, which may also lead to open sores (ulcers) in the intestine.    Duodenitis may develop suddenly and last for a short time (acute), or it may develop gradually and last for months or years (chronic).    What are the causes?  The most common cause of duodenitis is an infection from a type of bacteria called Helicobacter pylori (H. pylori). Other causes of this condition include:  Long-term use of NSAIDs.  Excessive use of alcohol.  An infection of the small intestine caused by the Giardia parasite (giardiasis).  Crohn's disease.  Certain diseases of the body's defense system (immune system).  Certain treatments for cancer.      What increases the risk?  The following factors may make you more likely to develop this condition:  Smoking cigarettes.  Drinking alcohol.  Having a family history of duodenitis.  Taking NSAIDs.  Eating a high-fat diet.  What are the signs or symptoms?  Symptoms of this condition may include:  Gnawing or burning pain in the upper center of the abdomen (epigastric pain). This may get worse when the stomach is empty and may get better after eating.  Abdominal cramps.  Nausea and vomiting.  Bloody vomit.  Stools that are bloody, dark, or look like tar.  Diarrhea.  Weight loss.  Fatigue.    How is this diagnosed?  This condition may be diagnosed based on your medical history and a physical exam. You may also have tests, such as:  Blood tests.  Stool tests.  A test that checks the gases in your breath.  An X-ray that is done after you swallow a liquid (barium) that makes your digestive tract easier to see.  Endoscopy. This is an exam of the duodenum that is done by putting a thin tube with a tiny camera on the end (endoscope) down your throat. A sample of tissue from your duodenum (biopsy) may be removed with the endoscope and examined under a microscope for signs of inflammation and infection.      How is this treated?  Treatment depends on the cause of your condition. Treatment may include:  Antibiotic medicine to treat H. pylori infection.  Stopping your intake of NSAIDs.  Medicine to reduce stomach acids.  Medicines to treat other conditions, such as Crohn's disease or giardiasis.  Surgery to treat severe inflammation that causes scarring or severe bleeding.  Follow these instructions at home:  Medicines    Take over-the-counter and prescription medicines only as told by your health care provider.  If you were prescribed an antibiotic medicine, take it as told by your health care provider. Do not stop taking the antibiotic even if you start to feel better.  Eating and drinking    A sign showing that a person should not drink alcohol.  Eat small, frequent meals.  Do not drink alcohol.  Drink enough water to keep your urine pale yellow.  Follow instructions from your health care provider about eating or drinking restrictions. You may be asked to avoid:  Caffeinated drinks.  Chocolate.  Peppermint or mint-flavored food or drinks.  Garlic or onions.  Spicy foods.  Citrus fruits.  Tomato-based foods.  Fatty or fried foods.  General instructions    Do not use any products that contain nicotine or tobacco, such as cigarettes and e-cigarettes. If you need help quitting, ask your health care provider.  Keep all follow-up visits as told by your health care provider. This is important.    Contact a health care provider if:  You have a fever.  Your symptoms come back, get worse, or do not get better with treatment.    Get help right away if:  You vomit blood.  You have severe abdominal pain.  Your abdomen swells and is painful.  You have a lot of blood in your stool.  You feel dizzy or light-headed.      Summary  Duodenitis is inflammation of the lining of the first part of the small intestine. This part of the small intestine is called the duodenum.  Duodenitis may develop suddenly and last for a short time (acute), or it may develop gradually and last longer (chronic).  The most common cause of duodenitis is an infection from a type of bacteria.  Take over-the-counter and prescription medicines only as told by your health care provider.  This information is not intended to replace advice given to you by your health care provider. Make sure you discuss any questions you have with your health care provider.

## 2023-04-24 NOTE — ED PROVIDER NOTE - CARE PROVIDER_API CALL
Ethan Ness)  Gastroenterology  [address] [place] [address]  Phone: [phone]  Fax: [phone]  Follow Up Time:

## 2023-04-24 NOTE — ED PROVIDER NOTE - PHYSICAL EXAMINATION
CONSTITUTIONAL: Well-appearing; well-nourished; in no apparent distress.   EYES: PERRL; EOM intact.   CARDIOVASCULAR: Normal S1, S2; no murmurs, rubs, or gallops.   RESPIRATORY: Normal chest excursion with respiration; breath sounds clear and equal bilaterally; no wheezes, rhonchi, or rales.  GI/: Epigastric/RUQ tenderness normal bowel sounds; non-distended; no palpable organomegaly.   MS: No calf swelling and tenderness.  SKIN: Normal for age and race; warm; dry; good turgor; no apparent lesions or exudate.   NEURO/PSYCH: A & O x 4; grossly unremarkable.

## 2023-04-25 VITALS
RESPIRATION RATE: 18 BRPM | DIASTOLIC BLOOD PRESSURE: 68 MMHG | SYSTOLIC BLOOD PRESSURE: 155 MMHG | HEART RATE: 82 BPM | OXYGEN SATURATION: 96 % | TEMPERATURE: 98 F

## 2023-04-25 LAB
ALBUMIN SERPL ELPH-MCNC: 4.6 G/DL — SIGNIFICANT CHANGE UP (ref 3.5–5.2)
ALP SERPL-CCNC: 96 U/L — SIGNIFICANT CHANGE UP (ref 30–115)
ALT FLD-CCNC: 18 U/L — SIGNIFICANT CHANGE UP (ref 0–41)
ANION GAP SERPL CALC-SCNC: 12 MMOL/L — SIGNIFICANT CHANGE UP (ref 7–14)
APPEARANCE UR: CLEAR — SIGNIFICANT CHANGE UP
AST SERPL-CCNC: 24 U/L — SIGNIFICANT CHANGE UP (ref 0–41)
BACTERIA # UR AUTO: NEGATIVE — SIGNIFICANT CHANGE UP
BASOPHILS # BLD AUTO: 0.05 K/UL — SIGNIFICANT CHANGE UP (ref 0–0.2)
BASOPHILS NFR BLD AUTO: 0.8 % — SIGNIFICANT CHANGE UP (ref 0–1)
BILIRUB SERPL-MCNC: 0.3 MG/DL — SIGNIFICANT CHANGE UP (ref 0.2–1.2)
BILIRUB UR-MCNC: NEGATIVE — SIGNIFICANT CHANGE UP
BUN SERPL-MCNC: 20 MG/DL — SIGNIFICANT CHANGE UP (ref 10–20)
CALCIUM SERPL-MCNC: 9.8 MG/DL — SIGNIFICANT CHANGE UP (ref 8.4–10.5)
CHLORIDE SERPL-SCNC: 97 MMOL/L — LOW (ref 98–110)
CO2 SERPL-SCNC: 24 MMOL/L — SIGNIFICANT CHANGE UP (ref 17–32)
COLOR SPEC: YELLOW — SIGNIFICANT CHANGE UP
CREAT SERPL-MCNC: 1 MG/DL — SIGNIFICANT CHANGE UP (ref 0.7–1.5)
DIFF PNL FLD: ABNORMAL
EGFR: 61 ML/MIN/1.73M2 — SIGNIFICANT CHANGE UP
EOSINOPHIL # BLD AUTO: 0.13 K/UL — SIGNIFICANT CHANGE UP (ref 0–0.7)
EOSINOPHIL NFR BLD AUTO: 2.2 % — SIGNIFICANT CHANGE UP (ref 0–8)
EPI CELLS # UR: 3 /HPF — SIGNIFICANT CHANGE UP (ref 0–5)
GLUCOSE SERPL-MCNC: 167 MG/DL — HIGH (ref 70–99)
GLUCOSE UR QL: NEGATIVE — SIGNIFICANT CHANGE UP
HCT VFR BLD CALC: 37.7 % — SIGNIFICANT CHANGE UP (ref 37–47)
HGB BLD-MCNC: 12.1 G/DL — SIGNIFICANT CHANGE UP (ref 12–16)
HYALINE CASTS # UR AUTO: 1 /LPF — SIGNIFICANT CHANGE UP (ref 0–7)
IMM GRANULOCYTES NFR BLD AUTO: 0.2 % — SIGNIFICANT CHANGE UP (ref 0.1–0.3)
KETONES UR-MCNC: NEGATIVE — SIGNIFICANT CHANGE UP
LEUKOCYTE ESTERASE UR-ACNC: ABNORMAL
LIDOCAIN IGE QN: 61 U/L — HIGH (ref 7–60)
LYMPHOCYTES # BLD AUTO: 2.5 K/UL — SIGNIFICANT CHANGE UP (ref 1.2–3.4)
LYMPHOCYTES # BLD AUTO: 41.9 % — SIGNIFICANT CHANGE UP (ref 20.5–51.1)
MCHC RBC-ENTMCNC: 27.5 PG — SIGNIFICANT CHANGE UP (ref 27–31)
MCHC RBC-ENTMCNC: 32.1 G/DL — SIGNIFICANT CHANGE UP (ref 32–37)
MCV RBC AUTO: 85.7 FL — SIGNIFICANT CHANGE UP (ref 81–99)
MONOCYTES # BLD AUTO: 0.49 K/UL — SIGNIFICANT CHANGE UP (ref 0.1–0.6)
MONOCYTES NFR BLD AUTO: 8.2 % — SIGNIFICANT CHANGE UP (ref 1.7–9.3)
NEUTROPHILS # BLD AUTO: 2.79 K/UL — SIGNIFICANT CHANGE UP (ref 1.4–6.5)
NEUTROPHILS NFR BLD AUTO: 46.7 % — SIGNIFICANT CHANGE UP (ref 42.2–75.2)
NITRITE UR-MCNC: NEGATIVE — SIGNIFICANT CHANGE UP
NRBC # BLD: 0 /100 WBCS — SIGNIFICANT CHANGE UP (ref 0–0)
PH UR: 6 — SIGNIFICANT CHANGE UP (ref 5–8)
PLATELET # BLD AUTO: 343 K/UL — SIGNIFICANT CHANGE UP (ref 130–400)
PMV BLD: 9.2 FL — SIGNIFICANT CHANGE UP (ref 7.4–10.4)
POTASSIUM SERPL-MCNC: 4.4 MMOL/L — SIGNIFICANT CHANGE UP (ref 3.5–5)
POTASSIUM SERPL-SCNC: 4.4 MMOL/L — SIGNIFICANT CHANGE UP (ref 3.5–5)
PROT SERPL-MCNC: 8 G/DL — SIGNIFICANT CHANGE UP (ref 6–8)
PROT UR-MCNC: ABNORMAL
RBC # BLD: 4.4 M/UL — SIGNIFICANT CHANGE UP (ref 4.2–5.4)
RBC # FLD: 14.5 % — SIGNIFICANT CHANGE UP (ref 11.5–14.5)
RBC CASTS # UR COMP ASSIST: 3 /HPF — SIGNIFICANT CHANGE UP (ref 0–4)
SODIUM SERPL-SCNC: 133 MMOL/L — LOW (ref 135–146)
SP GR SPEC: 1.01 — SIGNIFICANT CHANGE UP (ref 1.01–1.03)
TROPONIN T SERPL-MCNC: <0.01 NG/ML — SIGNIFICANT CHANGE UP
UROBILINOGEN FLD QL: SIGNIFICANT CHANGE UP
WBC # BLD: 5.97 K/UL — SIGNIFICANT CHANGE UP (ref 4.8–10.8)
WBC # FLD AUTO: 5.97 K/UL — SIGNIFICANT CHANGE UP (ref 4.8–10.8)
WBC UR QL: 3 /HPF — SIGNIFICANT CHANGE UP (ref 0–5)

## 2023-04-25 PROCEDURE — 71046 X-RAY EXAM CHEST 2 VIEWS: CPT | Mod: 26

## 2023-04-25 PROCEDURE — 93010 ELECTROCARDIOGRAM REPORT: CPT

## 2023-04-25 PROCEDURE — 76705 ECHO EXAM OF ABDOMEN: CPT | Mod: 26

## 2023-04-25 PROCEDURE — 74177 CT ABD & PELVIS W/CONTRAST: CPT | Mod: 26,MA

## 2023-04-25 RX ORDER — SUCRALFATE 1 G
1 TABLET ORAL
Qty: 90 | Refills: 0
Start: 2023-04-25

## 2023-04-25 RX ORDER — PANTOPRAZOLE SODIUM 20 MG/1
1 TABLET, DELAYED RELEASE ORAL
Qty: 30 | Refills: 0
Start: 2023-04-25

## 2023-04-27 LAB
-  AMIKACIN: SIGNIFICANT CHANGE UP
-  AMOXICILLIN/CLAVULANIC ACID: SIGNIFICANT CHANGE UP
-  AMPICILLIN/SULBACTAM: SIGNIFICANT CHANGE UP
-  AMPICILLIN: SIGNIFICANT CHANGE UP
-  AZTREONAM: SIGNIFICANT CHANGE UP
-  CEFAZOLIN: SIGNIFICANT CHANGE UP
-  CEFEPIME: SIGNIFICANT CHANGE UP
-  CEFTRIAXONE: SIGNIFICANT CHANGE UP
-  CEFUROXIME: SIGNIFICANT CHANGE UP
-  CIPROFLOXACIN: SIGNIFICANT CHANGE UP
-  ERTAPENEM: SIGNIFICANT CHANGE UP
-  GENTAMICIN: SIGNIFICANT CHANGE UP
-  IMIPENEM: SIGNIFICANT CHANGE UP
-  LEVOFLOXACIN: SIGNIFICANT CHANGE UP
-  MEROPENEM: SIGNIFICANT CHANGE UP
-  NITROFURANTOIN: SIGNIFICANT CHANGE UP
-  PIPERACILLIN/TAZOBACTAM: SIGNIFICANT CHANGE UP
-  TOBRAMYCIN: SIGNIFICANT CHANGE UP
-  TRIMETHOPRIM/SULFAMETHOXAZOLE: SIGNIFICANT CHANGE UP
CULTURE RESULTS: SIGNIFICANT CHANGE UP
METHOD TYPE: SIGNIFICANT CHANGE UP
ORGANISM # SPEC MICROSCOPIC CNT: SIGNIFICANT CHANGE UP
ORGANISM # SPEC MICROSCOPIC CNT: SIGNIFICANT CHANGE UP
SPECIMEN SOURCE: SIGNIFICANT CHANGE UP

## 2023-05-01 RX ORDER — NITROFURANTOIN MACROCRYSTAL 50 MG
1 CAPSULE ORAL
Qty: 14 | Refills: 0
Start: 2023-05-01 | End: 2023-05-07

## 2023-07-01 ENCOUNTER — INPATIENT (INPATIENT)
Facility: HOSPITAL | Age: 68
LOS: 1 days | Discharge: HOME HEALTH | DRG: 439 | End: 2023-07-03
Attending: STUDENT IN AN ORGANIZED HEALTH CARE EDUCATION/TRAINING PROGRAM | Admitting: STUDENT IN AN ORGANIZED HEALTH CARE EDUCATION/TRAINING PROGRAM
Payer: MEDICARE

## 2023-07-01 VITALS
OXYGEN SATURATION: 98 % | TEMPERATURE: 98 F | WEIGHT: 192.02 LBS | HEART RATE: 91 BPM | RESPIRATION RATE: 22 BRPM | DIASTOLIC BLOOD PRESSURE: 67 MMHG | HEIGHT: 66 IN | SYSTOLIC BLOOD PRESSURE: 152 MMHG

## 2023-07-01 DIAGNOSIS — Z98.61 CORONARY ANGIOPLASTY STATUS: Chronic | ICD-10-CM

## 2023-07-01 DIAGNOSIS — Z98.890 OTHER SPECIFIED POSTPROCEDURAL STATES: Chronic | ICD-10-CM

## 2023-07-01 LAB
APPEARANCE UR: CLEAR — SIGNIFICANT CHANGE UP
BASOPHILS # BLD AUTO: 0.07 K/UL — SIGNIFICANT CHANGE UP (ref 0–0.2)
BASOPHILS NFR BLD AUTO: 0.9 % — SIGNIFICANT CHANGE UP (ref 0–1)
BILIRUB UR-MCNC: NEGATIVE — SIGNIFICANT CHANGE UP
COLOR SPEC: SIGNIFICANT CHANGE UP
DIFF PNL FLD: ABNORMAL
EOSINOPHIL # BLD AUTO: 0.11 K/UL — SIGNIFICANT CHANGE UP (ref 0–0.7)
EOSINOPHIL NFR BLD AUTO: 1.4 % — SIGNIFICANT CHANGE UP (ref 0–8)
GLUCOSE UR QL: SIGNIFICANT CHANGE UP
HCT VFR BLD CALC: 37.5 % — SIGNIFICANT CHANGE UP (ref 37–47)
HGB BLD-MCNC: 12.4 G/DL — SIGNIFICANT CHANGE UP (ref 12–16)
IMM GRANULOCYTES NFR BLD AUTO: 0.3 % — SIGNIFICANT CHANGE UP (ref 0.1–0.3)
KETONES UR-MCNC: NEGATIVE — SIGNIFICANT CHANGE UP
LEUKOCYTE ESTERASE UR-ACNC: ABNORMAL
LYMPHOCYTES # BLD AUTO: 2.84 K/UL — SIGNIFICANT CHANGE UP (ref 1.2–3.4)
LYMPHOCYTES # BLD AUTO: 35.6 % — SIGNIFICANT CHANGE UP (ref 20.5–51.1)
MCHC RBC-ENTMCNC: 27.7 PG — SIGNIFICANT CHANGE UP (ref 27–31)
MCHC RBC-ENTMCNC: 33.1 G/DL — SIGNIFICANT CHANGE UP (ref 32–37)
MCV RBC AUTO: 83.9 FL — SIGNIFICANT CHANGE UP (ref 81–99)
MONOCYTES # BLD AUTO: 0.46 K/UL — SIGNIFICANT CHANGE UP (ref 0.1–0.6)
MONOCYTES NFR BLD AUTO: 5.8 % — SIGNIFICANT CHANGE UP (ref 1.7–9.3)
NEUTROPHILS # BLD AUTO: 4.47 K/UL — SIGNIFICANT CHANGE UP (ref 1.4–6.5)
NEUTROPHILS NFR BLD AUTO: 56 % — SIGNIFICANT CHANGE UP (ref 42.2–75.2)
NITRITE UR-MCNC: NEGATIVE — SIGNIFICANT CHANGE UP
NRBC # BLD: 0 /100 WBCS — SIGNIFICANT CHANGE UP (ref 0–0)
PH UR: 5.5 — SIGNIFICANT CHANGE UP (ref 5–8)
PLATELET # BLD AUTO: 301 K/UL — SIGNIFICANT CHANGE UP (ref 130–400)
PMV BLD: 9.6 FL — SIGNIFICANT CHANGE UP (ref 7.4–10.4)
PROT UR-MCNC: SIGNIFICANT CHANGE UP
RBC # BLD: 4.47 M/UL — SIGNIFICANT CHANGE UP (ref 4.2–5.4)
RBC # FLD: 13.8 % — SIGNIFICANT CHANGE UP (ref 11.5–14.5)
SP GR SPEC: 1.01 — SIGNIFICANT CHANGE UP (ref 1.01–1.03)
UROBILINOGEN FLD QL: SIGNIFICANT CHANGE UP
WBC # BLD: 7.97 K/UL — SIGNIFICANT CHANGE UP (ref 4.8–10.8)
WBC # FLD AUTO: 7.97 K/UL — SIGNIFICANT CHANGE UP (ref 4.8–10.8)

## 2023-07-01 PROCEDURE — 93010 ELECTROCARDIOGRAM REPORT: CPT

## 2023-07-01 PROCEDURE — 99285 EMERGENCY DEPT VISIT HI MDM: CPT

## 2023-07-01 RX ORDER — SODIUM CHLORIDE 9 MG/ML
1000 INJECTION, SOLUTION INTRAVENOUS ONCE
Refills: 0 | Status: COMPLETED | OUTPATIENT
Start: 2023-07-01 | End: 2023-07-01

## 2023-07-01 RX ORDER — FAMOTIDINE 10 MG/ML
20 INJECTION INTRAVENOUS ONCE
Refills: 0 | Status: COMPLETED | OUTPATIENT
Start: 2023-07-01 | End: 2023-07-01

## 2023-07-01 RX ORDER — MORPHINE SULFATE 50 MG/1
4 CAPSULE, EXTENDED RELEASE ORAL ONCE
Refills: 0 | Status: DISCONTINUED | OUTPATIENT
Start: 2023-07-01 | End: 2023-07-01

## 2023-07-01 RX ORDER — ONDANSETRON 8 MG/1
4 TABLET, FILM COATED ORAL ONCE
Refills: 0 | Status: COMPLETED | OUTPATIENT
Start: 2023-07-01 | End: 2023-07-01

## 2023-07-01 RX ADMIN — FAMOTIDINE 20 MILLIGRAM(S): 10 INJECTION INTRAVENOUS at 23:32

## 2023-07-01 RX ADMIN — MORPHINE SULFATE 4 MILLIGRAM(S): 50 CAPSULE, EXTENDED RELEASE ORAL at 23:32

## 2023-07-01 RX ADMIN — SODIUM CHLORIDE 1000 MILLILITER(S): 9 INJECTION, SOLUTION INTRAVENOUS at 23:32

## 2023-07-01 RX ADMIN — ONDANSETRON 4 MILLIGRAM(S): 8 TABLET, FILM COATED ORAL at 23:32

## 2023-07-01 NOTE — ED PROVIDER NOTE - CLINICAL SUMMARY MEDICAL DECISION MAKING FREE TEXT BOX
67 years old female history of hypertension, diabetes, CAD s/p PCI, Diagnosed with pancreatitis/duodenitis on recent CT imaging in April as well as IV urine culture with ESBL presenting today with bilateral flank pain similar to previous episode that began around 6 PM proxy 1 hour after patient ate pizza.  Denies fevers, denies vomiting or diarrhea. denies dysuria. Labs with elevated lipase, imaging with new hepatic lesion, worsening dilatation of her pancreatitic duct. patient requiring multiple doses of opiates for pain management. will admit for intractable pain. UA with uti however patient afebrile, denies dysuria. will defer abx at this time.

## 2023-07-01 NOTE — ED PROVIDER NOTE - OBJECTIVE STATEMENT
67-year-old female with past medical history of pancreatitis, CAD (x2 stent, on Plavix and ASA 81 mg), hypercholesterolemia, HTN, DM presents with complaints of bilateral flank pain.  Patient states pain began at 6 PM today, reports having pizza at 5PM but does not believe pain is related to eating. Reports symptoms are similar to what she felt at end of April when she was found to have pancreatitis and urine culture positive for ESBL.  Patient denies fever, endorses mild chills.  Denies chest pain, shortness of breath, abdominal pain, nausea/vomiting/diarrhea, dysuria/frequency/urgency/hematuria, numbness/tingling, melena/hematochezia.

## 2023-07-01 NOTE — ED ADULT TRIAGE NOTE - CHIEF COMPLAINT QUOTE
We were here in April with the same issue, they gave her medicine and she was fine . Now she has the pain (bilateral flank) and I give her the medicine but the pain not go - daughter  Patient diagnosed with duodenitis, given Sucralfate

## 2023-07-01 NOTE — ED PROVIDER NOTE - ATTENDING APP SHARED VISIT CONTRIBUTION OF CARE
67 years old female history of hypertension, diabetes, CAD s/p PCI, Diagnosed with pancreatitis/duodenitis on recent CT imaging in April as well as IV urine culture with ESBL presenting today with bilateral flank pain similar to previous episode that began around 6 PM proxy 1 hour after patient ate pizza.  Denies fevers, denies vomiting or diarrhea. denies dysuria.     CONSTITUTIONAL: Well-developed; well-nourished; in no acute distress.   SKIN: warm, dry  HEAD: Normocephalic; atraumatic.  EYES: PERRL, EOMI, no conjunctival erythema  ENT: No nasal discharge; airway clear.  NECK: Supple; non tender.  CARD: S1, S2 normal; no murmurs, gallops, or rubs. Regular rate and rhythm.   RESP: No wheezes, rales or rhonchi.  ABD: soft nondistended, tenderness in the epigastric region, +right cva tenderness.   EXT: Normal ROM.  No clubbing, cyanosis or edema.   NEURO: Alert, oriented, grossly unremarkable.  PSYCH: Cooperative, appropriate.

## 2023-07-01 NOTE — ED PROVIDER NOTE - PHYSICAL EXAMINATION
N/A
Physical Exam    Vital Signs: I have reviewed the initial vital signs.  Constitutional: appears stated age, no acute distress  Eyes: Sclera clear, EOMI.  Cardiovascular: S1 and S2, regular rate, regular rhythm, well-perfused extremities, radial pulses equal and 2+, pedal pulses 2+ and equal  Respiratory: unlabored respiratory effort, clear to auscultation bilaterally no wheezing, rales and rhonchi  Gastrointestinal: soft, abdomen ttp at epigastrium, negative oconnor's sign, no pulsatile mass, normal bowl sounds, (+) CVA ttp bilaterally  Musculoskeletal: supple neck, no lower extremity edema, no midline tenderness  Integumentary: warm, dry, no rash  Neurologic: awake, alert, oriented x3, extremities’ motor and sensory functions grossly intact  Psychiatric: appropriate mood, appropriate affect

## 2023-07-02 DIAGNOSIS — K85.90 ACUTE PANCREATITIS WITHOUT NECROSIS OR INFECTION, UNSPECIFIED: ICD-10-CM

## 2023-07-02 LAB
ALBUMIN SERPL ELPH-MCNC: 3.8 G/DL — SIGNIFICANT CHANGE UP (ref 3.5–5.2)
ALBUMIN SERPL ELPH-MCNC: 4.7 G/DL — SIGNIFICANT CHANGE UP (ref 3.5–5.2)
ALP SERPL-CCNC: 101 U/L — SIGNIFICANT CHANGE UP (ref 30–115)
ALP SERPL-CCNC: 88 U/L — SIGNIFICANT CHANGE UP (ref 30–115)
ALT FLD-CCNC: 12 U/L — SIGNIFICANT CHANGE UP (ref 0–41)
ALT FLD-CCNC: 15 U/L — SIGNIFICANT CHANGE UP (ref 0–41)
ANION GAP SERPL CALC-SCNC: 12 MMOL/L — SIGNIFICANT CHANGE UP (ref 7–14)
ANION GAP SERPL CALC-SCNC: 14 MMOL/L — SIGNIFICANT CHANGE UP (ref 7–14)
AST SERPL-CCNC: 16 U/L — SIGNIFICANT CHANGE UP (ref 0–41)
AST SERPL-CCNC: 23 U/L — SIGNIFICANT CHANGE UP (ref 0–41)
BACTERIA # UR AUTO: NEGATIVE — SIGNIFICANT CHANGE UP
BASOPHILS # BLD AUTO: 0.05 K/UL — SIGNIFICANT CHANGE UP (ref 0–0.2)
BASOPHILS NFR BLD AUTO: 1.1 % — HIGH (ref 0–1)
BILIRUB SERPL-MCNC: 0.2 MG/DL — SIGNIFICANT CHANGE UP (ref 0.2–1.2)
BILIRUB SERPL-MCNC: 0.4 MG/DL — SIGNIFICANT CHANGE UP (ref 0.2–1.2)
BUN SERPL-MCNC: 16 MG/DL — SIGNIFICANT CHANGE UP (ref 10–20)
BUN SERPL-MCNC: 23 MG/DL — HIGH (ref 10–20)
CALCIUM SERPL-MCNC: 9 MG/DL — SIGNIFICANT CHANGE UP (ref 8.4–10.5)
CALCIUM SERPL-MCNC: 9.8 MG/DL — SIGNIFICANT CHANGE UP (ref 8.4–10.5)
CHLORIDE SERPL-SCNC: 100 MMOL/L — SIGNIFICANT CHANGE UP (ref 98–110)
CHLORIDE SERPL-SCNC: 99 MMOL/L — SIGNIFICANT CHANGE UP (ref 98–110)
CO2 SERPL-SCNC: 24 MMOL/L — SIGNIFICANT CHANGE UP (ref 17–32)
CO2 SERPL-SCNC: 24 MMOL/L — SIGNIFICANT CHANGE UP (ref 17–32)
CREAT SERPL-MCNC: 0.9 MG/DL — SIGNIFICANT CHANGE UP (ref 0.7–1.5)
CREAT SERPL-MCNC: 1 MG/DL — SIGNIFICANT CHANGE UP (ref 0.7–1.5)
EGFR: 61 ML/MIN/1.73M2 — SIGNIFICANT CHANGE UP
EGFR: 70 ML/MIN/1.73M2 — SIGNIFICANT CHANGE UP
EOSINOPHIL # BLD AUTO: 0.08 K/UL — SIGNIFICANT CHANGE UP (ref 0–0.7)
EOSINOPHIL NFR BLD AUTO: 1.8 % — SIGNIFICANT CHANGE UP (ref 0–8)
EPI CELLS # UR: 1 /HPF — SIGNIFICANT CHANGE UP (ref 0–5)
GLUCOSE BLDC GLUCOMTR-MCNC: 123 MG/DL — HIGH (ref 70–99)
GLUCOSE BLDC GLUCOMTR-MCNC: 147 MG/DL — HIGH (ref 70–99)
GLUCOSE BLDC GLUCOMTR-MCNC: 158 MG/DL — HIGH (ref 70–99)
GLUCOSE BLDC GLUCOMTR-MCNC: 180 MG/DL — HIGH (ref 70–99)
GLUCOSE SERPL-MCNC: 158 MG/DL — HIGH (ref 70–99)
GLUCOSE SERPL-MCNC: 180 MG/DL — HIGH (ref 70–99)
HCT VFR BLD CALC: 33.4 % — LOW (ref 37–47)
HGB BLD-MCNC: 10.7 G/DL — LOW (ref 12–16)
HYALINE CASTS # UR AUTO: 0 /LPF — SIGNIFICANT CHANGE UP (ref 0–7)
IMM GRANULOCYTES NFR BLD AUTO: 0.2 % — SIGNIFICANT CHANGE UP (ref 0.1–0.3)
LACTATE SERPL-SCNC: 2 MMOL/L — SIGNIFICANT CHANGE UP (ref 0.7–2)
LIDOCAIN IGE QN: 240 U/L — HIGH (ref 7–60)
LYMPHOCYTES # BLD AUTO: 2.27 K/UL — SIGNIFICANT CHANGE UP (ref 1.2–3.4)
LYMPHOCYTES # BLD AUTO: 49.8 % — SIGNIFICANT CHANGE UP (ref 20.5–51.1)
MAGNESIUM SERPL-MCNC: 1.2 MG/DL — LOW (ref 1.8–2.4)
MCHC RBC-ENTMCNC: 27.1 PG — SIGNIFICANT CHANGE UP (ref 27–31)
MCHC RBC-ENTMCNC: 32 G/DL — SIGNIFICANT CHANGE UP (ref 32–37)
MCV RBC AUTO: 84.6 FL — SIGNIFICANT CHANGE UP (ref 81–99)
MONOCYTES # BLD AUTO: 0.45 K/UL — SIGNIFICANT CHANGE UP (ref 0.1–0.6)
MONOCYTES NFR BLD AUTO: 9.9 % — HIGH (ref 1.7–9.3)
NEUTROPHILS # BLD AUTO: 1.7 K/UL — SIGNIFICANT CHANGE UP (ref 1.4–6.5)
NEUTROPHILS NFR BLD AUTO: 37.2 % — LOW (ref 42.2–75.2)
NRBC # BLD: 0 /100 WBCS — SIGNIFICANT CHANGE UP (ref 0–0)
PLATELET # BLD AUTO: 244 K/UL — SIGNIFICANT CHANGE UP (ref 130–400)
PMV BLD: 9.6 FL — SIGNIFICANT CHANGE UP (ref 7.4–10.4)
POTASSIUM SERPL-MCNC: 4.3 MMOL/L — SIGNIFICANT CHANGE UP (ref 3.5–5)
POTASSIUM SERPL-MCNC: 4.7 MMOL/L — SIGNIFICANT CHANGE UP (ref 3.5–5)
POTASSIUM SERPL-SCNC: 4.3 MMOL/L — SIGNIFICANT CHANGE UP (ref 3.5–5)
POTASSIUM SERPL-SCNC: 4.7 MMOL/L — SIGNIFICANT CHANGE UP (ref 3.5–5)
PROT SERPL-MCNC: 6.4 G/DL — SIGNIFICANT CHANGE UP (ref 6–8)
PROT SERPL-MCNC: 7.6 G/DL — SIGNIFICANT CHANGE UP (ref 6–8)
RBC # BLD: 3.95 M/UL — LOW (ref 4.2–5.4)
RBC # FLD: 13.8 % — SIGNIFICANT CHANGE UP (ref 11.5–14.5)
RBC CASTS # UR COMP ASSIST: 1 /HPF — SIGNIFICANT CHANGE UP (ref 0–4)
SODIUM SERPL-SCNC: 136 MMOL/L — SIGNIFICANT CHANGE UP (ref 135–146)
SODIUM SERPL-SCNC: 137 MMOL/L — SIGNIFICANT CHANGE UP (ref 135–146)
TRIGL SERPL-MCNC: 193 MG/DL — HIGH
TROPONIN T SERPL-MCNC: <0.01 NG/ML — SIGNIFICANT CHANGE UP
URATE CRY FLD QL MICRO: SIGNIFICANT CHANGE UP
WBC # BLD: 4.56 K/UL — LOW (ref 4.8–10.8)
WBC # FLD AUTO: 4.56 K/UL — LOW (ref 4.8–10.8)
WBC UR QL: 9 /HPF — HIGH (ref 0–5)

## 2023-07-02 PROCEDURE — 71045 X-RAY EXAM CHEST 1 VIEW: CPT | Mod: 26

## 2023-07-02 PROCEDURE — 36415 COLL VENOUS BLD VENIPUNCTURE: CPT

## 2023-07-02 PROCEDURE — 99223 1ST HOSP IP/OBS HIGH 75: CPT

## 2023-07-02 PROCEDURE — 82962 GLUCOSE BLOOD TEST: CPT

## 2023-07-02 PROCEDURE — 85025 COMPLETE CBC W/AUTO DIFF WBC: CPT

## 2023-07-02 PROCEDURE — 83735 ASSAY OF MAGNESIUM: CPT

## 2023-07-02 PROCEDURE — 74177 CT ABD & PELVIS W/CONTRAST: CPT | Mod: 26,MA

## 2023-07-02 PROCEDURE — 80053 COMPREHEN METABOLIC PANEL: CPT

## 2023-07-02 PROCEDURE — 83036 HEMOGLOBIN GLYCOSYLATED A1C: CPT

## 2023-07-02 RX ORDER — DEXTROSE 50 % IN WATER 50 %
12.5 SYRINGE (ML) INTRAVENOUS ONCE
Refills: 0 | Status: DISCONTINUED | OUTPATIENT
Start: 2023-07-02 | End: 2023-07-03

## 2023-07-02 RX ORDER — ACETAMINOPHEN 500 MG
650 TABLET ORAL EVERY 6 HOURS
Refills: 0 | Status: DISCONTINUED | OUTPATIENT
Start: 2023-07-02 | End: 2023-07-03

## 2023-07-02 RX ORDER — GLUCAGON INJECTION, SOLUTION 0.5 MG/.1ML
1 INJECTION, SOLUTION SUBCUTANEOUS ONCE
Refills: 0 | Status: DISCONTINUED | OUTPATIENT
Start: 2023-07-02 | End: 2023-07-03

## 2023-07-02 RX ORDER — ENOXAPARIN SODIUM 100 MG/ML
40 INJECTION SUBCUTANEOUS EVERY 24 HOURS
Refills: 0 | Status: DISCONTINUED | OUTPATIENT
Start: 2023-07-02 | End: 2023-07-03

## 2023-07-02 RX ORDER — PANTOPRAZOLE SODIUM 20 MG/1
40 TABLET, DELAYED RELEASE ORAL
Refills: 0 | Status: DISCONTINUED | OUTPATIENT
Start: 2023-07-02 | End: 2023-07-03

## 2023-07-02 RX ORDER — MORPHINE SULFATE 50 MG/1
4 CAPSULE, EXTENDED RELEASE ORAL ONCE
Refills: 0 | Status: DISCONTINUED | OUTPATIENT
Start: 2023-07-02 | End: 2023-07-02

## 2023-07-02 RX ORDER — DIPHENHYDRAMINE HCL 50 MG
50 CAPSULE ORAL ONCE
Refills: 0 | Status: DISCONTINUED | OUTPATIENT
Start: 2023-07-02 | End: 2023-07-03

## 2023-07-02 RX ORDER — SODIUM CHLORIDE 9 MG/ML
1000 INJECTION, SOLUTION INTRAVENOUS
Refills: 0 | Status: DISCONTINUED | OUTPATIENT
Start: 2023-07-02 | End: 2023-07-03

## 2023-07-02 RX ORDER — GLIMEPIRIDE 1 MG
1 TABLET ORAL
Qty: 0 | Refills: 0 | DISCHARGE

## 2023-07-02 RX ORDER — HYDROMORPHONE HYDROCHLORIDE 2 MG/ML
0.5 INJECTION INTRAMUSCULAR; INTRAVENOUS; SUBCUTANEOUS EVERY 6 HOURS
Refills: 0 | Status: DISCONTINUED | OUTPATIENT
Start: 2023-07-02 | End: 2023-07-03

## 2023-07-02 RX ORDER — CLOPIDOGREL BISULFATE 75 MG/1
75 TABLET, FILM COATED ORAL DAILY
Refills: 0 | Status: DISCONTINUED | OUTPATIENT
Start: 2023-07-02 | End: 2023-07-02

## 2023-07-02 RX ORDER — ASPIRIN/CALCIUM CARB/MAGNESIUM 324 MG
81 TABLET ORAL DAILY
Refills: 0 | Status: DISCONTINUED | OUTPATIENT
Start: 2023-07-02 | End: 2023-07-03

## 2023-07-02 RX ORDER — INSULIN LISPRO 100/ML
VIAL (ML) SUBCUTANEOUS
Refills: 0 | Status: DISCONTINUED | OUTPATIENT
Start: 2023-07-02 | End: 2023-07-03

## 2023-07-02 RX ORDER — ONDANSETRON 8 MG/1
4 TABLET, FILM COATED ORAL EVERY 8 HOURS
Refills: 0 | Status: DISCONTINUED | OUTPATIENT
Start: 2023-07-02 | End: 2023-07-03

## 2023-07-02 RX ORDER — DEXTROSE 50 % IN WATER 50 %
25 SYRINGE (ML) INTRAVENOUS ONCE
Refills: 0 | Status: DISCONTINUED | OUTPATIENT
Start: 2023-07-02 | End: 2023-07-03

## 2023-07-02 RX ORDER — DEXTROSE 50 % IN WATER 50 %
15 SYRINGE (ML) INTRAVENOUS ONCE
Refills: 0 | Status: DISCONTINUED | OUTPATIENT
Start: 2023-07-02 | End: 2023-07-03

## 2023-07-02 RX ORDER — SODIUM CHLORIDE 9 MG/ML
1000 INJECTION, SOLUTION INTRAVENOUS ONCE
Refills: 0 | Status: COMPLETED | OUTPATIENT
Start: 2023-07-02 | End: 2023-07-02

## 2023-07-02 RX ORDER — SITAGLIPTIN AND METFORMIN HYDROCHLORIDE 500; 50 MG/1; MG/1
1 TABLET, FILM COATED ORAL
Qty: 0 | Refills: 0 | DISCHARGE

## 2023-07-02 RX ORDER — LISINOPRIL 2.5 MG/1
20 TABLET ORAL DAILY
Refills: 0 | Status: DISCONTINUED | OUTPATIENT
Start: 2023-07-02 | End: 2023-07-03

## 2023-07-02 RX ORDER — ATORVASTATIN CALCIUM 80 MG/1
1 TABLET, FILM COATED ORAL
Qty: 0 | Refills: 0 | DISCHARGE

## 2023-07-02 RX ORDER — HYDROMORPHONE HYDROCHLORIDE 2 MG/ML
0.5 INJECTION INTRAMUSCULAR; INTRAVENOUS; SUBCUTANEOUS ONCE
Refills: 0 | Status: DISCONTINUED | OUTPATIENT
Start: 2023-07-02 | End: 2023-07-02

## 2023-07-02 RX ORDER — ATORVASTATIN CALCIUM 80 MG/1
80 TABLET, FILM COATED ORAL AT BEDTIME
Refills: 0 | Status: DISCONTINUED | OUTPATIENT
Start: 2023-07-02 | End: 2023-07-03

## 2023-07-02 RX ADMIN — Medication 150 MILLIGRAM(S): at 17:19

## 2023-07-02 RX ADMIN — HYDROMORPHONE HYDROCHLORIDE 0.5 MILLIGRAM(S): 2 INJECTION INTRAMUSCULAR; INTRAVENOUS; SUBCUTANEOUS at 20:08

## 2023-07-02 RX ADMIN — SODIUM CHLORIDE 1000 MILLILITER(S): 9 INJECTION, SOLUTION INTRAVENOUS at 05:32

## 2023-07-02 RX ADMIN — SODIUM CHLORIDE 150 MILLILITER(S): 9 INJECTION, SOLUTION INTRAVENOUS at 20:08

## 2023-07-02 RX ADMIN — ATORVASTATIN CALCIUM 80 MILLIGRAM(S): 80 TABLET, FILM COATED ORAL at 21:17

## 2023-07-02 RX ADMIN — Medication 81 MILLIGRAM(S): at 11:42

## 2023-07-02 RX ADMIN — SODIUM CHLORIDE 150 MILLILITER(S): 9 INJECTION, SOLUTION INTRAVENOUS at 06:40

## 2023-07-02 RX ADMIN — Medication 2: at 11:42

## 2023-07-02 RX ADMIN — HYDROMORPHONE HYDROCHLORIDE 0.5 MILLIGRAM(S): 2 INJECTION INTRAMUSCULAR; INTRAVENOUS; SUBCUTANEOUS at 07:39

## 2023-07-02 RX ADMIN — ENOXAPARIN SODIUM 40 MILLIGRAM(S): 100 INJECTION SUBCUTANEOUS at 05:36

## 2023-07-02 RX ADMIN — PANTOPRAZOLE SODIUM 40 MILLIGRAM(S): 20 TABLET, DELAYED RELEASE ORAL at 05:39

## 2023-07-02 RX ADMIN — MORPHINE SULFATE 4 MILLIGRAM(S): 50 CAPSULE, EXTENDED RELEASE ORAL at 02:19

## 2023-07-02 RX ADMIN — Medication 650 MILLIGRAM(S): at 11:22

## 2023-07-02 RX ADMIN — Medication 2: at 17:19

## 2023-07-02 RX ADMIN — MORPHINE SULFATE 4 MILLIGRAM(S): 50 CAPSULE, EXTENDED RELEASE ORAL at 02:23

## 2023-07-02 RX ADMIN — Medication 150 MILLIGRAM(S): at 05:33

## 2023-07-02 RX ADMIN — Medication 650 MILLIGRAM(S): at 10:22

## 2023-07-02 RX ADMIN — HYDROMORPHONE HYDROCHLORIDE 0.5 MILLIGRAM(S): 2 INJECTION INTRAMUSCULAR; INTRAVENOUS; SUBCUTANEOUS at 06:39

## 2023-07-02 RX ADMIN — LISINOPRIL 20 MILLIGRAM(S): 2.5 TABLET ORAL at 05:39

## 2023-07-02 NOTE — PATIENT PROFILE ADULT - FALL HARM RISK - RISK INTERVENTIONS
Assistance OOB with selected safe patient handling equipment/Assistance with ambulation/Communicate Fall Risk and Risk Factors to all staff, patient, and family/Discuss with provider need for PT consult/Monitor gait and stability/Provide patient with walking aids - walker, cane, crutches/Reinforce activity limits and safety measures with patient and family/Sit up slowly, dangle for a short time, stand at bedside before walking/Visual Cue: Yellow wristband/Bed in lowest position, wheels locked, appropriate side rails in place/Call bell, personal items and telephone in reach/Instruct patient to call for assistance before getting out of bed or chair/Non-slip footwear when patient is out of bed/[place] to call system/Physically safe environment - no spills, clutter or unnecessary equipment/Purposeful Proactive Rounding/Room/bathroom lighting operational, light cord in reach

## 2023-07-02 NOTE — CONSULT NOTE ADULT - SUBJECTIVE AND OBJECTIVE BOX
Gastroenterology Consultation:    Patient is a 67y old  Female who presents with a chief complaint of Pancreatitis (02 Jul 2023 04:24)    Admitted on: 07-02-23    HPI:  67-year-old female with past medical history of pancreatitis, CAD (x2 stent, on Plavix and ASA 81 mg), hypercholesterolemia, HTN, DM presented to the ED  with complaints of bilateral flank pain which began at 6 pm and progressed until she decided to come to the ED at 9pm.  Reports symptoms are similar to what she felt at end of April when she was found to have pancreatitis and UTI.  Patient denies fever, endorses mild chills.  Denies chest pain, shortness of breath, abdominal pain, nausea/vomiting/diarrhea, dysuria/frequency/urgency/hematuria, numbness/tingling, melena/hematochezia.    In the ED labs remarkable for lipase 240   vitals stable   CXR unremarkable   ct abd pelvis with iv contrast   Since 4/25/2023, new 2 cm indeterminate hypodensity in the right hepatic lobe.  Stable left uncinate process pancreatic hypodensity/cyst with slightly increased mild dilation of the proximal pancreatic duct, 5 mm. A contrast-enhanced MRI can be obtained for further evaluation. (02 Jul 2023 04:24)        Prior EGD:    Prior Colonoscopy:      PAST MEDICAL & SURGICAL HISTORY:  HTN (hypertension)      DM (diabetes mellitus)      CAD (coronary artery disease)      ST elevation myocardial infarction (STEMI), unspecified artery      S/p bilateral carpal tunnel release      S/P excision of lipoma  shoulder      H/O coronary angioplasty  with 2 stents            FAMILY HISTORY:  No pertinent family history in first degree relatives        Social History:  Tobacco:  Alcohol:  Drugs:    Home Medications:  aspirin 81 mg oral tablet: 1 tab(s) orally once a day (17 May 2019 07:13)  ezetimibe 10 mg oral tablet: 1 tab(s) orally once a day (17 May 2019 07:13)  Lipitor 80 mg oral tablet: 1 orally once a day (at bedtime) (02 Jul 2023 04:35)  lisinopril 20 mg oral tablet: 1 tab(s) orally once a day (23 Jul 2021 04:00)  Lyrica 150 mg oral capsule: 1 orally 2 times a day (02 Jul 2023 04:39)  MetFORMIN (Eqv-Fortamet) 1000 mg oral tablet, extended release: 1 orally 2 times a day (02 Jul 2023 04:36)  Metoprolol Tartrate 25 mg oral tablet: 1 tab(s) orally 2 times a day (17 May 2019 07:13)  Plavix 75 mg oral tablet: 1 tab(s) orally once a day (17 May 2019 07:13)  Rybelsus 3 mg oral tablet: 1 orally once a day (02 Jul 2023 04:37)        MEDICATIONS  (STANDING):  aspirin 81 milliGRAM(s) Oral daily  atorvastatin 80 milliGRAM(s) Oral at bedtime  clopidogrel Tablet 75 milliGRAM(s) Oral daily  dextrose 5%. 1000 milliLiter(s) (50 mL/Hr) IV Continuous <Continuous>  dextrose 5%. 1000 milliLiter(s) (100 mL/Hr) IV Continuous <Continuous>  dextrose 50% Injectable 25 Gram(s) IV Push once  dextrose 50% Injectable 25 Gram(s) IV Push once  dextrose 50% Injectable 12.5 Gram(s) IV Push once  diphenhydrAMINE 50 milliGRAM(s) Oral once  enoxaparin Injectable 40 milliGRAM(s) SubCutaneous every 24 hours  glucagon  Injectable 1 milliGRAM(s) IntraMuscular once  insulin lispro (ADMELOG) corrective regimen sliding scale   SubCutaneous three times a day before meals  lactated ringers. 1000 milliLiter(s) (150 mL/Hr) IV Continuous <Continuous>  lisinopril 20 milliGRAM(s) Oral daily  pantoprazole    Tablet 40 milliGRAM(s) Oral before breakfast  pregabalin 150 milliGRAM(s) Oral two times a day    MEDICATIONS  (PRN):  acetaminophen     Tablet .. 650 milliGRAM(s) Oral every 6 hours PRN Temp greater or equal to 38C (100.4F), Mild Pain (1 - 3)  dextrose Oral Gel 15 Gram(s) Oral once PRN Blood Glucose LESS THAN 70 milliGRAM(s)/deciliter  HYDROmorphone  Injectable 0.5 milliGRAM(s) IV Push every 6 hours PRN Severe Pain (7 - 10)  ondansetron Injectable 4 milliGRAM(s) IV Push every 8 hours PRN Nausea and/or Vomiting      Allergies  No Known Allergies      Review of Systems:   Constitutional:  No Fever, No Chills  ENT/Mouth:  No Hearing Changes,  No Difficulty Swallowing  Eyes:  No Eye Pain, No Vision Changes  Cardiovascular:  No Chest Pain, No Palpitations  Respiratory:  No Cough, No Dyspnea  Gastrointestinal:  As described in HPI  Musculoskeletal:  No Joint Swelling, No Back Pain  Skin:  No Skin Lesions, No Jaundice  Neuro:  No Syncope, No Dizziness  Heme/Lymph:  No Bruising, No Bleeding.          Physical Examination:  T(C): 36.1 (07-02-23 @ 08:53), Max: 36.9 (07-02-23 @ 03:30)  HR: 82 (07-02-23 @ 08:53) (78 - 91)  BP: 123/58 (07-02-23 @ 08:53) (122/74 - 152/67)  RR: 18 (07-02-23 @ 08:53) (17 - 22)  SpO2: 96% (07-02-23 @ 03:30) (96% - 98%)  Height (cm): 167.6 (07-01-23 @ 22:25)  Weight (kg): 87.1 (07-01-23 @ 22:25)        GENERAL: AAOx3, no acute distress.  HEAD:  Atraumatic, Normocephalic  EYES: conjunctiva and sclera clear  NECK: Supple, no JVD or thyromegaly  CHEST/LUNG: Clear to auscultation bilaterally; No wheeze, rhonchi, or rales  HEART: Regular rate and rhythm; normal S1, S2, No murmurs.  ABDOMEN: Soft, nontender, nondistended; Bowel sounds present  NEUROLOGY: No asterixis or tremor.   SKIN: Intact, no jaundice        Data:                        10.7   4.56  )-----------( 244      ( 02 Jul 2023 11:24 )             33.4     Hgb Trend:  10.7  07-02-23 @ 11:24  12.4  07-01-23 @ 23:45      07-02    136  |  100  |  16  ----------------------------<  180<H>  4.3   |  24  |  0.9    Ca    9.0      02 Jul 2023 11:24  Mg     1.2     07-02    TPro  6.4  /  Alb  3.8  /  TBili  0.4  /  DBili  x   /  AST  16  /  ALT  12  /  AlkPhos  88  07-02    Liver panel trend:  TBili 0.4   /   AST 16   /   ALT 12   /   AlkP 88   /   Tptn 6.4   /   Alb 3.8    /   DBili --      07-02  TBili 0.2   /   AST 23   /   ALT 15   /   AlkP 101   /   Tptn 7.6   /   Alb 4.7    /   DBili --      07-01              Radiology:  CT Abdomen and Pelvis w/ IV Cont:   ACC: [id number] EXAM:  CT ABDOMEN AND PELVIS IC   ORDERED BY: LICO MAGALLON     PROCEDURE DATE:  07/02/2023          INTERPRETATION:  CLINICAL STATEMENT: Abdominal pain, bilateral flank pain.    TECHNIQUE: Contiguous axial CT images were obtainedfrom the lower chest   to the pubic symphysis following administration of 100cc Omnipaque 350   intravenous contrast.  Oral contrast was not administered.  Reformatted   images in the coronal and sagittal planes were acquired.    COMPARISON CT: CT abdomen pelvis 4/25/2023      FINDINGS:    LOWER CHEST: Subsegmental atelectasis. Coronary artery calcifications.    HEPATOBILIARY: New, ill-defined indeterminate left hepatic lobe segment   IVb hypodensity measuring approximately 2 cm (4-119). New dilated CBD   measuring up to 8 mm (previously 4 mm).    SPLEEN: Unremarkable.    PANCREAS: Slightly increased prominent/mild dilation of the proximal   pancreatic duct, 6 mm. Stable 1.3 cm hypodensity/cyst at the uncinate   process..    ADRENAL GLANDS: Unremarkable.    KIDNEYS: Symmetric renal enhancement. No hydronephrosis. No perinephric   stranding. Stable partially atrophic left kidney. Stable left upper pole   hypodensities, too small to further characterize. Retroaortic left renal   vein, normal variant    ABDOMINOPELVIC NODES: Unremarkable.    PELVIC ORGANS: Urinary bladder is unremarkable.    PERITONEUM/MESENTERY/BOWEL: No evidence of bowel obstruction, free air,   or ascites. The appendix is not definitively visualized, although there   are no inflammatory changes in the right lower quadrant.    BONES/SOFT TISSUES: Degenerative changes of the spine. No acute osseous   abnormality.      IMPRESSION:  1.  No evidence of acute abdominopelvic pathology. Specifically no CT   evidence of acute pancreatitis.  2.  Since 4/25/2023, new CBD dilation up to 8 mm (previously 4 mm) and   pancreatic duct dilation up to 6 mm with new hepatic segment IVb   ill-defined hypodensity. Stable uncinate process 1.3 cm hypodense cystic   lesion. Pancreatic protocol MRI without and with contrast recommended on   an outpatient basis to further assess these findings.        --- End of Report ---          RAYNE JAMIL MD; Resident Radiologist  This document has been electronically signed.  LAINE LEWIS MD; Attending Radiologist  This document has been electronically signed. Jul 2 2023  5:02AM (07-02-23 @ 00:49)       Gastroenterology Consultation:    Patient is a 67y old  Female who presents with a chief complaint of abdominal pain  (02 Jul 2023 04:24)    Admitted on: 07-02-23    HPI:  67-year-old female with past medical history of pancreatitis, CAD (x2 stent, on Plavix and ASA 81 mg), hypercholesterolemia, HTN, DM presented to the ED  with complaints of bilateral flank pain which began yesterday and progressed until she decided to come to the ED last night.  Reports symptoms are similar to what she felt at end of April when she was found to have pancreatitis and UTI.  Patient denies fever.  Denies nausea , vomiting, diarrhea, melena, hematochezia. In the ED labs remarkable for lipase 240 CT showing   new 2 cm indeterminate hypodensity in the right hepatic lobe. Stable left uncinate process pancreatic hypodensity/cyst with slightly increased mild dilation of the proximal pancreatic duct, 5 mm.    Prior EGD/ Colonoscopy:  no previous egd and colonoscopy     PAST MEDICAL & SURGICAL HISTORY:  HTN (hypertension)      DM (diabetes mellitus)      CAD (coronary artery disease)      ST elevation myocardial infarction (STEMI), unspecified artery      S/p bilateral carpal tunnel release      S/P excision of lipoma  shoulder      H/O coronary angioplasty  with 2 stents            FAMILY HISTORY:  No pertinent family history in first degree relatives  no fh of gi can[facility]       Social History:  Tobacco: denies   Alcohol: denies   Drugs: denies     Home Medications:  aspirin 81 mg oral tablet: 1 tab(s) orally once a day (17 May 2019 07:13)  ezetimibe 10 mg oral tablet: 1 tab(s) orally once a day (17 May 2019 07:13)  Lipitor 80 mg oral tablet: 1 orally once a day (at bedtime) (02 Jul 2023 04:35)  lisinopril 20 mg oral tablet: 1 tab(s) orally once a day (23 Jul 2021 04:00)  Lyrica 150 mg oral capsule: 1 orally 2 times a day (02 Jul 2023 04:39)  MetFORMIN (Eqv-Fortamet) 1000 mg oral tablet, extended release: 1 orally 2 times a day (02 Jul 2023 04:36)  Metoprolol Tartrate 25 mg oral tablet: 1 tab(s) orally 2 times a day (17 May 2019 07:13)  Plavix 75 mg oral tablet: 1 tab(s) orally once a day (17 May 2019 07:13)  Rybelsus 3 mg oral tablet: 1 orally once a day (02 Jul 2023 04:37)        MEDICATIONS  (STANDING):  aspirin 81 milliGRAM(s) Oral daily  atorvastatin 80 milliGRAM(s) Oral at bedtime  clopidogrel Tablet 75 milliGRAM(s) Oral daily  dextrose 5%. 1000 milliLiter(s) (50 mL/Hr) IV Continuous <Continuous>  dextrose 5%. 1000 milliLiter(s) (100 mL/Hr) IV Continuous <Continuous>  dextrose 50% Injectable 25 Gram(s) IV Push once  dextrose 50% Injectable 25 Gram(s) IV Push once  dextrose 50% Injectable 12.5 Gram(s) IV Push once  diphenhydrAMINE 50 milliGRAM(s) Oral once  enoxaparin Injectable 40 milliGRAM(s) SubCutaneous every 24 hours  glucagon  Injectable 1 milliGRAM(s) IntraMuscular once  insulin lispro (ADMELOG) corrective regimen sliding scale   SubCutaneous three times a day before meals  lactated ringers. 1000 milliLiter(s) (150 mL/Hr) IV Continuous <Continuous>  lisinopril 20 milliGRAM(s) Oral daily  pantoprazole    Tablet 40 milliGRAM(s) Oral before breakfast  pregabalin 150 milliGRAM(s) Oral two times a day    MEDICATIONS  (PRN):  acetaminophen     Tablet .. 650 milliGRAM(s) Oral every 6 hours PRN Temp greater or equal to 38C (100.4F), Mild Pain (1 - 3)  dextrose Oral Gel 15 Gram(s) Oral once PRN Blood Glucose LESS THAN 70 milliGRAM(s)/deciliter  HYDROmorphone  Injectable 0.5 milliGRAM(s) IV Push every 6 hours PRN Severe Pain (7 - 10)  ondansetron Injectable 4 milliGRAM(s) IV Push every 8 hours PRN Nausea and/or Vomiting      Allergies  No Known Allergies      Review of Systems:   Constitutional:  No Fever, No Chills  ENT/Mouth:  No Hearing Changes,  No Difficulty Swallowing  Eyes:  No Eye Pain, No Vision Changes  Cardiovascular:  No Chest Pain, No Palpitations  Respiratory:  No Cough, No Dyspnea  Gastrointestinal:  As described in HPI  Musculoskeletal:  No Joint Swelling, No Back Pain  Skin:  No Skin Lesions, No Jaundice  Neuro:  No Syncope, No Dizziness  Heme/Lymph:  No Bruising, No Bleeding.          Physical Examination:  T(C): 36.1 (07-02-23 @ 08:53), Max: 36.9 (07-02-23 @ 03:30)  HR: 82 (07-02-23 @ 08:53) (78 - 91)  BP: 123/58 (07-02-23 @ 08:53) (122/74 - 152/67)  RR: 18 (07-02-23 @ 08:53) (17 - 22)  SpO2: 96% (07-02-23 @ 03:30) (96% - 98%)  Height (cm): 167.6 (07-01-23 @ 22:25)  Weight (kg): 87.1 (07-01-23 @ 22:25)        GENERAL: AAOx3, no acute distress.  HEAD:  Atraumatic, Normocephalic  EYES: conjunctiva and sclera clear  NECK: Supple, no JVD or thyromegaly  CHEST/LUNG: Clear to auscultation bilaterally; No wheeze, rhonchi, or rales  HEART: Regular rate and rhythm; normal S1, S2, No murmurs.  ABDOMEN: Soft, nontender, nondistended; Bowel sounds present  NEUROLOGY: No asterixis or tremor.   SKIN: Intact, no jaundice        Data:                        10.7   4.56  )-----------( 244      ( 02 Jul 2023 11:24 )             33.4     Hgb Trend:  10.7  07-02-23 @ 11:24  12.4  07-01-23 @ 23:45      07-02    136  |  100  |  16  ----------------------------<  180<H>  4.3   |  24  |  0.9    Ca    9.0      02 Jul 2023 11:24  Mg     1.2     07-02    TPro  6.4  /  Alb  3.8  /  TBili  0.4  /  DBili  x   /  AST  16  /  ALT  12  /  AlkPhos  88  07-02    Liver panel trend:  TBili 0.4   /   AST 16   /   ALT 12   /   AlkP 88   /   Tptn 6.4   /   Alb 3.8    /   DBili --      07-02  TBili 0.2   /   AST 23   /   ALT 15   /   AlkP 101   /   Tptn 7.6   /   Alb 4.7    /   DBili --      07-01              Radiology:  CT Abdomen and Pelvis w/ IV Cont:   ACC: [id number] EXAM:  CT ABDOMEN AND PELVIS IC   ORDERED BY: LICO MAGALLON     PROCEDURE DATE:  07/02/2023          INTERPRETATION:  CLINICAL STATEMENT: Abdominal pain, bilateral flank pain.    TECHNIQUE: Contiguous axial CT images were obtainedfrom the lower chest   to the pubic symphysis following administration of 100cc Omnipaque 350   intravenous contrast.  Oral contrast was not administered.  Reformatted   images in the coronal and sagittal planes were acquired.    COMPARISON CT: CT abdomen pelvis 4/25/2023      FINDINGS:    LOWER CHEST: Subsegmental atelectasis. Coronary artery calcifications.    HEPATOBILIARY: New, ill-defined indeterminate left hepatic lobe segment   IVb hypodensity measuring approximately 2 cm (4-119). New dilated CBD   measuring up to 8 mm (previously 4 mm).    SPLEEN: Unremarkable.    PANCREAS: Slightly increased prominent/mild dilation of the proximal   pancreatic duct, 6 mm. Stable 1.3 cm hypodensity/cyst at the uncinate   process..    ADRENAL GLANDS: Unremarkable.    KIDNEYS: Symmetric renal enhancement. No hydronephrosis. No perinephric   stranding. Stable partially atrophic left kidney. Stable left upper pole   hypodensities, too small to further characterize. Retroaortic left renal   vein, normal variant    ABDOMINOPELVIC NODES: Unremarkable.    PELVIC ORGANS: Urinary bladder is unremarkable.    PERITONEUM/MESENTERY/BOWEL: No evidence of bowel obstruction, free air,   or ascites. The appendix is not definitively visualized, although there   are no inflammatory changes in the right lower quadrant.    BONES/SOFT TISSUES: Degenerative changes of the spine. No acute osseous   abnormality.      IMPRESSION:  1.  No evidence of acute abdominopelvic pathology. Specifically no CT   evidence of acute pancreatitis.  2.  Since 4/25/2023, new CBD dilation up to 8 mm (previously 4 mm) and   pancreatic duct dilation up to 6 mm with new hepatic segment IVb   ill-defined hypodensity. Stable uncinate process 1.3 cm hypodense cystic   lesion. Pancreatic protocol MRI without and with contrast recommended on   an outpatient basis to further assess these findings.        --- End of Report ---          RAYNE JAMIL MD; Resident Radiologist  This document has been electronically signed.  LAINE LEWIS MD; Attending Radiologist  This document has been electronically signed. Jul 2 2023  5:02AM (07-02-23 @ 00:49)

## 2023-07-02 NOTE — H&P ADULT - ASSESSMENT
67-year-old female with past medical history of pancreatitis, CAD (x2 stent, on Plavix and ASA 81 mg), hypercholesterolemia, HTN, DM presented to the ED  with complaints of progressively worsening bilateral flank pain found to have lipase of 240 and ct imaging concerning for pancreatitis.     #pancreatitis, acute   - patient with ed visit in april '23 with similar pain, had ct at the time which suggestive of pancreatitis   -now with lipase 240 and ct imaging concerning for same   -LR @ 150 cc/hr   -patient reports feeling chills after  morphine->pain control with dilauded 0.5mg q6prn give firs dose stat   -advanced gi for pancreatic duct dilation on ct     #HTN   -monitor bp  -continue home meds   #DM   -check a1c   -start sliding scale insulin and will convert to basal/bolus after 24 hours   #CAD  -had 2 stents prior, continue asa and plavix   #HLD   -continue home lipitor 80mg        #diet: advance as tolerated carb consist  #dvt ppx lovenox  #gi ppx protonix   #activity: increase as tolerated   # dispo; admit to medicine  67-year-old female with past medical history of pancreatitis, CAD (x2 stent, on Plavix and ASA 81 mg), hypercholesterolemia, HTN, DM presented to the ED  with complaints of progressively worsening bilateral flank pain found to have lipase of 240 and ct imaging concerning for pancreatitis.     #pancreatitis, acute   - patient with ed visit in april '23 with similar pain, had ct at the time which suggestive of pancreatitis   -now with lipase 240 and ct imaging concerning for same   -LR @ 150 cc/hr   -patient reports feeling chills after  morphine->pain control with dilauded 0.5mg q6prn give firs dose stat   -advanced gi for pancreatic duct dilation on ct     #HTN   -monitor bp  -continue home meds   #DM   -check a1c   -start sliding scale insulin and will convert to basal/bolus after 24 hours   #CAD  -had 2 stents prior, continue asa and plavix   #HLD   -continue home lipitor 80mg      #incidental liver density   -ct imaging with  New, ill-defined indeterminate left hepatic lobe segment IVb hypodensity measuring approximately 2 cm (4-119). New dilated CBD measuring up to 8 mm (previously 4 mm).  -op hepatology f/u      #diet: advance as tolerated carb consist  #dvt ppx lovenox  #gi ppx protonix   #activity: increase as tolerated   # dispo; admit to medicine

## 2023-07-02 NOTE — CONSULT NOTE ADULT - ASSESSMENT
67-year-old female with past medical history of pancreatitis, CAD (x2 stent, on Plavix and ASA 81 mg), hypercholesterolemia, HTN, DM presented to the ED  with complaints of bilateral flank pain which began yesterday and progressed until she decided to come to the ED last night. GI consulted for dilated PD/CBD and pancreatic cyst.     # Pancreatic cyst with Dilated PD and new CBD dilation   # Hepatic Hypodensity  - ED visit in April with similar symptoms  - lipase 240   - Reviewed CT and RUQ US   - LFT Wnl   - 1.3 cm pancreatic cyst     PLAN   Will plan for EUS and FNA   Hold Plavix for 5 days prior to the procedure   MR liver protocol as outpatient   IV hydration   Regular Diet   CEA level  CA 19-9   Will follow

## 2023-07-02 NOTE — ED ADULT NURSE NOTE - OBJECTIVE STATEMENT
Pt is a 67 yr F c/o b/l flank pain x 1 day. pt's daughter denies any N/V/D or fevers. Pt has a hx of duodenitis.

## 2023-07-02 NOTE — H&P ADULT - HISTORY OF PRESENT ILLNESS
67-year-old female with past medical history of pancreatitis, CAD (x2 stent, on Plavix and ASA 81 mg), hypercholesterolemia, HTN, DM presented to the ED  with complaints of bilateral flank pain which began at 6pm and progressed until she decided to come to the ED at 9pm.  Reports symptoms are similar to what she felt at end of April when she was found to have pancreatitis and urine culture positive for ESBL.  Patient denies fever, endorses mild chills.  Denies chest pain, shortness of breath, abdominal pain, nausea/vomiting/diarrhea, dysuria/frequency/urgency/hematuria, numbness/tingling, melena/hematochezia.    In the ED labs remarkable for lipase 240   vitals stable   CXR unremarkable   ct abd pelvis with iv contrast   Since 4/25/2023, new 2 cm indeterminate hypodensity in the right hepatic lobe.  Stable left uncinate process pancreatic hypodensity/cyst with slightly increased mild dilation of the proximal pancreatic duct, 5 mm. A contrast-enhanced MRI can be obtained for further evaluation.

## 2023-07-02 NOTE — H&P ADULT - NSHPREVIEWOFSYSTEMS_GEN_ALL_CORE
REVIEW OF SYSTEMS:    CONSTITUTIONAL: No weakness, fevers +chills started only after she came to the hospital which is made worse with movement   EYES/ENT: No visual changes;  No vertigo or throat pain   NECK: No pain or stiffness  RESPIRATORY: No cough, wheezing, hemoptysis; No shortness of breath  CARDIOVASCULAR: No chest pain or palpitations  GASTROINTESTINAL: No abdominal or epigastric pain. No nausea, vomiting, or hematemesis; No diarrhea or constipation. No melena or hematochezia.  GENITOURINARY: No dysuria, frequency or hematuria  NEUROLOGICAL: No numbness or weakness  SKIN: No itching, rashes

## 2023-07-02 NOTE — H&P ADULT - ATTENDING COMMENTS
67-year-old female with past medical history of pancreatitis, CAD (x2 stent, on Plavix and ASA 81 mg), hypercholesterolemia, HTN, DM presented to the ED  with complaints of progressively worsening bilateral flank pain found to have lipase of 240 and ct imaging concerning for pancreatitis.     #pancreatitis, acute   - patient with ed visit in april '23 with similar pain, had ct at the time which suggestive of pancreatitis   -now with lipase 240 and ct imaging increased cbd dilation to 8mm   -LR @ 150 cc/hr   -pain control with dilauded 0.5mg q6prn give firs dose stat   -GI consulted - recs are pending     #HTN   -monitor bp  -continue home meds     #DM   -H a1c pending  - FS and sliding scale in place    #CAD  -had 2 stents prior, continue asa and plavix     #HLD   -continue home lipitor 80mg      #incidental liver density   -ct imaging with  New, ill-defined indeterminate left hepatic lobe segment IVb hypodensity measuring approximately 2 cm (4-119). New dilated CBD measuring up to 8 mm (previously 4 mm).  -op hepatology f/u      #diet: advance as tolerated carb consist  #dvt ppx lovenox  #gi ppx protonix   #activity: increase as tolerated   # dispo; admit to medicine   # Pending: gi recs, AIC, pain control

## 2023-07-02 NOTE — H&P ADULT - NSHPPHYSICALEXAM_GEN_ALL_CORE
LOS:     VITALS:   T(C): 36.9 (07-02-23 @ 03:30), Max: 36.9 (07-02-23 @ 03:30)  HR: 89 (07-02-23 @ 03:30) (89 - 91)  BP: 126/62 (07-02-23 @ 03:30) (126/62 - 152/67)  RR: 17 (07-02-23 @ 03:30) (17 - 22)  SpO2: 96% (07-02-23 @ 03:30) (96% - 98%)    GENERAL: NAD, lying in bed comfortably  HEAD:  Atraumatic, Normocephalic  EYES: EOMI, PERRLA, conjunctiva and sclera clear  ENT: Moist mucous membranes  NECK: Supple, No JVD  CHEST/LUNG: Clear to auscultation bilaterally; No rales, rhonchi, wheezing, or rubs. Unlabored respirations  HEART: Regular rate and rhythm; No murmurs, rubs, or gallops  ABDOMEN: BSx4; Soft, nontender, nondistended  EXTREMITIES:  2+ Peripheral Pulses, brisk capillary refill. No clubbing, cyanosis, or edema  NERVOUS SYSTEM:  A&Ox3, no focal deficits   SKIN: No rashes or lesions

## 2023-07-02 NOTE — H&P ADULT - NSHPLABSRESULTS_GEN_ALL_CORE
Vitals:  ============  T(F): 98.5 (02 Jul 2023 03:30), Max: 98.5 (02 Jul 2023 03:30)  HR: 89 (02 Jul 2023 03:30)  BP: 126/62 (02 Jul 2023 03:30)  RR: 17 (02 Jul 2023 03:30)  SpO2: 96% (02 Jul 2023 03:30) (96% - 98%)  temp max in last 48H T(F): , Max: 98.5 (07-02-23 @ 03:30)    =======================================================  Current Antibiotics:    Other medications:  diphenhydrAMINE 50 milliGRAM(s) Oral once      =======================================================  Labs:                        12.4   7.97  )-----------( 301      ( 01 Jul 2023 23:45 )             37.5     07-01    137  |  99  |  23<H>  ----------------------------<  158<H>  4.7   |  24  |  1.0    Ca    9.8      01 Jul 2023 23:45    TPro  7.6  /  Alb  4.7  /  TBili  0.2  /  DBili  x   /  AST  23  /  ALT  15  /  AlkPhos  101  07-01      Creatinine: 1.0 mg/dL (07-01-23 @ 23:45)              WBC Count: 7.97 K/uL (07-01-23 @ 23:45)        Alkaline Phosphatase: 101 U/L (07-01-23 @ 23:45)  Alanine Aminotransferase (ALT/SGPT): 15 U/L (07-01-23 @ 23:45)  Aspartate Aminotransferase (AST/SGOT): 23 U/L (07-01-23 @ 23:45)  Bilirubin Total: 0.2 mg/dL (07-01-23 @ 23:45)

## 2023-07-02 NOTE — ED ADULT NURSE NOTE - NSFALLUNIVINTERV_ED_ALL_ED
Bed/Stretcher in lowest position, wheels locked, appropriate side rails in place/Call bell, personal items and telephone in reach/Instruct patient to call for assistance before getting out of bed/chair/stretcher/Non-slip footwear applied when patient is off stretcher/[place] to call system/Physically safe environment - no spills, clutter or unnecessary equipment/Purposeful proactive rounding/Room/bathroom lighting operational, light cord in reach

## 2023-07-03 ENCOUNTER — TRANSCRIPTION ENCOUNTER (OUTPATIENT)
Age: 68
End: 2023-07-03

## 2023-07-03 VITALS
DIASTOLIC BLOOD PRESSURE: 61 MMHG | HEART RATE: 81 BPM | SYSTOLIC BLOOD PRESSURE: 123 MMHG | TEMPERATURE: 98 F | RESPIRATION RATE: 18 BRPM | OXYGEN SATURATION: 96 %

## 2023-07-03 LAB
A1C WITH ESTIMATED AVERAGE GLUCOSE RESULT: 9 % — HIGH (ref 4–5.6)
A1C WITH ESTIMATED AVERAGE GLUCOSE RESULT: 9.1 % — HIGH (ref 4–5.6)
ALBUMIN SERPL ELPH-MCNC: 4 G/DL — SIGNIFICANT CHANGE UP (ref 3.5–5.2)
ALP SERPL-CCNC: 84 U/L — SIGNIFICANT CHANGE UP (ref 30–115)
ALT FLD-CCNC: 11 U/L — SIGNIFICANT CHANGE UP (ref 0–41)
ANION GAP SERPL CALC-SCNC: 13 MMOL/L — SIGNIFICANT CHANGE UP (ref 7–14)
AST SERPL-CCNC: 18 U/L — SIGNIFICANT CHANGE UP (ref 0–41)
BASOPHILS # BLD AUTO: 0.05 K/UL — SIGNIFICANT CHANGE UP (ref 0–0.2)
BASOPHILS NFR BLD AUTO: 1 % — SIGNIFICANT CHANGE UP (ref 0–1)
BILIRUB SERPL-MCNC: 0.5 MG/DL — SIGNIFICANT CHANGE UP (ref 0.2–1.2)
BUN SERPL-MCNC: 9 MG/DL — LOW (ref 10–20)
CALCIUM SERPL-MCNC: 9.5 MG/DL — SIGNIFICANT CHANGE UP (ref 8.4–10.4)
CHLORIDE SERPL-SCNC: 100 MMOL/L — SIGNIFICANT CHANGE UP (ref 98–110)
CO2 SERPL-SCNC: 25 MMOL/L — SIGNIFICANT CHANGE UP (ref 17–32)
CREAT SERPL-MCNC: 0.8 MG/DL — SIGNIFICANT CHANGE UP (ref 0.7–1.5)
EGFR: 80 ML/MIN/1.73M2 — SIGNIFICANT CHANGE UP
EOSINOPHIL # BLD AUTO: 0.11 K/UL — SIGNIFICANT CHANGE UP (ref 0–0.7)
EOSINOPHIL NFR BLD AUTO: 2.3 % — SIGNIFICANT CHANGE UP (ref 0–8)
ESTIMATED AVERAGE GLUCOSE: 212 MG/DL — HIGH (ref 68–114)
ESTIMATED AVERAGE GLUCOSE: 214 MG/DL — HIGH (ref 68–114)
GLUCOSE BLDC GLUCOMTR-MCNC: 151 MG/DL — HIGH (ref 70–99)
GLUCOSE BLDC GLUCOMTR-MCNC: 170 MG/DL — HIGH (ref 70–99)
GLUCOSE SERPL-MCNC: 187 MG/DL — HIGH (ref 70–99)
HCT VFR BLD CALC: 34.1 % — LOW (ref 37–47)
HGB BLD-MCNC: 11.2 G/DL — LOW (ref 12–16)
IMM GRANULOCYTES NFR BLD AUTO: 0.2 % — SIGNIFICANT CHANGE UP (ref 0.1–0.3)
LYMPHOCYTES # BLD AUTO: 2.32 K/UL — SIGNIFICANT CHANGE UP (ref 1.2–3.4)
LYMPHOCYTES # BLD AUTO: 48.5 % — SIGNIFICANT CHANGE UP (ref 20.5–51.1)
MAGNESIUM SERPL-MCNC: 1.2 MG/DL — LOW (ref 1.8–2.4)
MCHC RBC-ENTMCNC: 27.8 PG — SIGNIFICANT CHANGE UP (ref 27–31)
MCHC RBC-ENTMCNC: 32.8 G/DL — SIGNIFICANT CHANGE UP (ref 32–37)
MCV RBC AUTO: 84.6 FL — SIGNIFICANT CHANGE UP (ref 81–99)
MONOCYTES # BLD AUTO: 0.41 K/UL — SIGNIFICANT CHANGE UP (ref 0.1–0.6)
MONOCYTES NFR BLD AUTO: 8.6 % — SIGNIFICANT CHANGE UP (ref 1.7–9.3)
NEUTROPHILS # BLD AUTO: 1.88 K/UL — SIGNIFICANT CHANGE UP (ref 1.4–6.5)
NEUTROPHILS NFR BLD AUTO: 39.4 % — LOW (ref 42.2–75.2)
NRBC # BLD: 0 /100 WBCS — SIGNIFICANT CHANGE UP (ref 0–0)
PLATELET # BLD AUTO: 250 K/UL — SIGNIFICANT CHANGE UP (ref 130–400)
PMV BLD: 9.6 FL — SIGNIFICANT CHANGE UP (ref 7.4–10.4)
POTASSIUM SERPL-MCNC: 4.4 MMOL/L — SIGNIFICANT CHANGE UP (ref 3.5–5)
POTASSIUM SERPL-SCNC: 4.4 MMOL/L — SIGNIFICANT CHANGE UP (ref 3.5–5)
PROT SERPL-MCNC: 6.5 G/DL — SIGNIFICANT CHANGE UP (ref 6–8)
RBC # BLD: 4.03 M/UL — LOW (ref 4.2–5.4)
RBC # FLD: 13.7 % — SIGNIFICANT CHANGE UP (ref 11.5–14.5)
SODIUM SERPL-SCNC: 138 MMOL/L — SIGNIFICANT CHANGE UP (ref 135–146)
WBC # BLD: 4.78 K/UL — LOW (ref 4.8–10.8)
WBC # FLD AUTO: 4.78 K/UL — LOW (ref 4.8–10.8)

## 2023-07-03 PROCEDURE — 99233 SBSQ HOSP IP/OBS HIGH 50: CPT

## 2023-07-03 PROCEDURE — 99239 HOSP IP/OBS DSCHRG MGMT >30: CPT

## 2023-07-03 RX ORDER — CLOPIDOGREL BISULFATE 75 MG/1
1 TABLET, FILM COATED ORAL
Qty: 0 | Refills: 0 | DISCHARGE

## 2023-07-03 RX ORDER — MAGNESIUM SULFATE 500 MG/ML
2 VIAL (ML) INJECTION
Refills: 0 | Status: COMPLETED | OUTPATIENT
Start: 2023-07-03 | End: 2023-07-03

## 2023-07-03 RX ADMIN — Medication 25 GRAM(S): at 14:14

## 2023-07-03 RX ADMIN — Medication 2: at 11:21

## 2023-07-03 RX ADMIN — SODIUM CHLORIDE 150 MILLILITER(S): 9 INJECTION, SOLUTION INTRAVENOUS at 06:03

## 2023-07-03 RX ADMIN — Medication 150 MILLIGRAM(S): at 06:02

## 2023-07-03 RX ADMIN — Medication 650 MILLIGRAM(S): at 07:30

## 2023-07-03 RX ADMIN — Medication 81 MILLIGRAM(S): at 11:21

## 2023-07-03 RX ADMIN — Medication 25 GRAM(S): at 12:15

## 2023-07-03 RX ADMIN — Medication 25 GRAM(S): at 10:23

## 2023-07-03 RX ADMIN — PANTOPRAZOLE SODIUM 40 MILLIGRAM(S): 20 TABLET, DELAYED RELEASE ORAL at 06:02

## 2023-07-03 RX ADMIN — LISINOPRIL 20 MILLIGRAM(S): 2.5 TABLET ORAL at 06:02

## 2023-07-03 RX ADMIN — Medication 2: at 07:29

## 2023-07-03 RX ADMIN — ENOXAPARIN SODIUM 40 MILLIGRAM(S): 100 INJECTION SUBCUTANEOUS at 06:02

## 2023-07-03 RX ADMIN — Medication 650 MILLIGRAM(S): at 08:10

## 2023-07-03 NOTE — DISCHARGE NOTE PROVIDER - ATTENDING DISCHARGE PHYSICAL EXAMINATION:
Vital Signs Last 24 Hrs  T(F): 98.6 (03 Jul 2023 07:08), Max: 98.6 (03 Jul 2023 07:08)  HR: 92 (03 Jul 2023 07:08) (72 - 92)  BP: 121/58 (03 Jul 2023 07:08) (117/57 - 138/64)  RR: 18 (03 Jul 2023 07:08) (18 - 18)  SpO2: 93% (03 Jul 2023 07:08) (93% - 93%)    PHYSICAL EXAM:  GENERAL: NAD, well-groomed, well-developed  HEAD:  Atraumatic, Normocephalic  EYES: EOMI, conjunctiva and sclera clear  ENMT: Moist mucous membranes, Good dentition, no thrush  NECK: Supple, No JVD  CHEST/LUNG: Clear to auscultation bilaterally, good air entry, non-labored breathing  HEART: RRR; S1/S2, No murmur  ABDOMEN: Soft, Nontender, Nondistended; obese  VASCULAR: Normal pulses, Normal capillary refill  EXTREMITIES: No calf tenderness, No cyanosis, No edema  LYMPH: Normal; No lymphadenopathy noted  SKIN: Warm, Intact  PSYCH: Normal mood, Normal affect  NERVOUS SYSTEM:  A/O x3, Good concentration; CN 2-12 intact, No focal deficits

## 2023-07-03 NOTE — DISCHARGE NOTE PROVIDER - NSDCFUADDINST_GEN_ALL_CORE_FT
If you have difficulty eating or drinking due to pain, nausea, or vomiting, please return to the hospital as soon as possibile to be seen.

## 2023-07-03 NOTE — PROGRESS NOTE ADULT - SUBJECTIVE AND OBJECTIVE BOX
Gastroenterology progress note:     Patient is a 67y old  Female who presents with a chief complaint of Pancreatic cyst (02 Jul 2023 15:22)       Admitted on: 07-02-23    We are following the patient for pancreatic cyst      Interval History:  denies any abdominal pain, nausea, vomiting   currently tolerating liquid diet        PAST MEDICAL & SURGICAL HISTORY:  HTN (hypertension)      DM (diabetes mellitus)      CAD (coronary artery disease)      ST elevation myocardial infarction (STEMI), unspecified artery      S/p bilateral carpal tunnel release      S/P excision of lipoma  shoulder      H/O coronary angioplasty  with 2 stents          MEDICATIONS  (STANDING):  aspirin 81 milliGRAM(s) Oral daily  atorvastatin 80 milliGRAM(s) Oral at bedtime  dextrose 5%. 1000 milliLiter(s) (50 mL/Hr) IV Continuous <Continuous>  dextrose 5%. 1000 milliLiter(s) (100 mL/Hr) IV Continuous <Continuous>  dextrose 50% Injectable 25 Gram(s) IV Push once  dextrose 50% Injectable 25 Gram(s) IV Push once  dextrose 50% Injectable 12.5 Gram(s) IV Push once  diphenhydrAMINE 50 milliGRAM(s) Oral once  enoxaparin Injectable 40 milliGRAM(s) SubCutaneous every 24 hours  glucagon  Injectable 1 milliGRAM(s) IntraMuscular once  insulin lispro (ADMELOG) corrective regimen sliding scale   SubCutaneous three times a day before meals  lactated ringers. 1000 milliLiter(s) (150 mL/Hr) IV Continuous <Continuous>  lisinopril 20 milliGRAM(s) Oral daily  pantoprazole    Tablet 40 milliGRAM(s) Oral before breakfast  pregabalin 150 milliGRAM(s) Oral two times a day    MEDICATIONS  (PRN):  acetaminophen     Tablet .. 650 milliGRAM(s) Oral every 6 hours PRN Temp greater or equal to 38C (100.4F), Mild Pain (1 - 3)  dextrose Oral Gel 15 Gram(s) Oral once PRN Blood Glucose LESS THAN 70 milliGRAM(s)/deciliter  HYDROmorphone  Injectable 0.5 milliGRAM(s) IV Push every 6 hours PRN Severe Pain (7 - 10)  ondansetron Injectable 4 milliGRAM(s) IV Push every 8 hours PRN Nausea and/or Vomiting      Allergies  No Known Allergies      Review of Systems:   Cardiovascular:  No Chest Pain, No Palpitations  Respiratory:  No Cough, No Dyspnea  Gastrointestinal:  As described in HPI    Physical Examination:  T(C): 37 (07-03-23 @ 07:08), Max: 37 (07-03-23 @ 07:08)  HR: 92 (07-03-23 @ 07:08) (72 - 92)  BP: 121/58 (07-03-23 @ 07:08) (117/57 - 138/64)  RR: 18 (07-03-23 @ 07:08) (18 - 18)  SpO2: 93% (07-03-23 @ 07:08) (93% - 93%)      07-02-23 @ 07:01  -  07-03-23 @ 07:00  --------------------------------------------------------  IN: 2265 mL / OUT: 300 mL / NET: 1965 mL      Constitutional: No acute distress.  Respiratory:  No signs of respiratory distress. Lung sounds are clear bilaterally.  Cardiovascular:  S1 S2, Regular rate and rhythm.  Abdominal: Abdomen is soft, symmetric, and non-tender without distention.        Data:                        10.7   4.56  )-----------( 244      ( 02 Jul 2023 11:24 )             33.4     Hgb trend:  10.7  07-02-23 @ 11:24  12.4  07-01-23 @ 23:45        07-02    136  |  100  |  16  ----------------------------<  180<H>  4.3   |  24  |  0.9    Ca    9.0      02 Jul 2023 11:24  Mg     1.2     07-02    TPro  6.4  /  Alb  3.8  /  TBili  0.4  /  DBili  x   /  AST  16  /  ALT  12  /  AlkPhos  88  07-02    Liver panel trend:  TBili 0.4   /   AST 16   /   ALT 12   /   AlkP 88   /   Tptn 6.4   /   Alb 3.8    /   DBili --      07-02  TBili 0.2   /   AST 23   /   ALT 15   /   AlkP 101   /   Tptn 7.6   /   Alb 4.7    /   DBili --      07-01             Radiology:       Gastroenterology progress note:     Patient is a 67y old  Female who presents with a chief complaint of Pancreatic cyst (02 Jul 2023 15:22)       Admitted on: 07-02-23    We are following the patient for pancreatic cyst      Interval History:  denies any abdominal pain, nausea, vomiting   currently tolerating liquid diet        PAST MEDICAL & SURGICAL HISTORY:  HTN (hypertension)      DM (diabetes mellitus)      CAD (coronary artery disease)      ST elevation myocardial infarction (STEMI), unspecified artery      S/p bilateral carpal tunnel release      S/P excision of lipoma  shoulder      H/O coronary angioplasty  with 2 stents          MEDICATIONS  (STANDING):  aspirin 81 milliGRAM(s) Oral daily  atorvastatin 80 milliGRAM(s) Oral at bedtime  dextrose 5%. 1000 milliLiter(s) (50 mL/Hr) IV Continuous <Continuous>  dextrose 5%. 1000 milliLiter(s) (100 mL/Hr) IV Continuous <Continuous>  dextrose 50% Injectable 25 Gram(s) IV Push once  dextrose 50% Injectable 25 Gram(s) IV Push once  dextrose 50% Injectable 12.5 Gram(s) IV Push once  diphenhydrAMINE 50 milliGRAM(s) Oral once  enoxaparin Injectable 40 milliGRAM(s) SubCutaneous every 24 hours  glucagon  Injectable 1 milliGRAM(s) IntraMuscular once  insulin lispro (ADMELOG) corrective regimen sliding scale   SubCutaneous three times a day before meals  lactated ringers. 1000 milliLiter(s) (150 mL/Hr) IV Continuous <Continuous>  lisinopril 20 milliGRAM(s) Oral daily  pantoprazole    Tablet 40 milliGRAM(s) Oral before breakfast  pregabalin 150 milliGRAM(s) Oral two times a day    MEDICATIONS  (PRN):  acetaminophen     Tablet .. 650 milliGRAM(s) Oral every 6 hours PRN Temp greater or equal to 38C (100.4F), Mild Pain (1 - 3)  dextrose Oral Gel 15 Gram(s) Oral once PRN Blood Glucose LESS THAN 70 milliGRAM(s)/deciliter  HYDROmorphone  Injectable 0.5 milliGRAM(s) IV Push every 6 hours PRN Severe Pain (7 - 10)  ondansetron Injectable 4 milliGRAM(s) IV Push every 8 hours PRN Nausea and/or Vomiting      Allergies  No Known Allergies      Review of Systems:   Cardiovascular:  No Chest Pain, No Palpitations  Respiratory:  No Cough, No Dyspnea  Gastrointestinal:  As described in HPI    Physical Examination:  T(C): 37 (07-03-23 @ 07:08), Max: 37 (07-03-23 @ 07:08)  HR: 92 (07-03-23 @ 07:08) (72 - 92)  BP: 121/58 (07-03-23 @ 07:08) (117/57 - 138/64)  RR: 18 (07-03-23 @ 07:08) (18 - 18)  SpO2: 93% (07-03-23 @ 07:08) (93% - 93%)      07-02-23 @ 07:01  -  07-03-23 @ 07:00  --------------------------------------------------------  IN: 2265 mL / OUT: 300 mL / NET: 1965 mL      Constitutional: No acute distress.  Respiratory:  No signs of respiratory distress. Lung sounds are clear bilaterally.  Cardiovascular:  S1 S2, Regular rate and rhythm.  Abdominal: Abdomen is soft, symmetric, and non-tender without distention.        Data:                        10.7   4.56  )-----------( 244      ( 02 Jul 2023 11:24 )             33.4     Hgb trend:  10.7  07-02-23 @ 11:24  12.4  07-01-23 @ 23:45        07-02    136  |  100  |  16  ----------------------------<  180<H>  4.3   |  24  |  0.9    Ca    9.0      02 Jul 2023 11:24  Mg     1.2     07-02    TPro  6.4  /  Alb  3.8  /  TBili  0.4  /  DBili  x   /  AST  16  /  ALT  12  /  AlkPhos  88  07-02    Liver panel trend:  TBili 0.4   /   AST 16   /   ALT 12   /   AlkP 88   /   Tptn 6.4   /   Alb 3.8    /   DBili --      07-02  TBili 0.2   /   AST 23   /   ALT 15   /   AlkP 101   /   Tptn 7.6   /   Alb 4.7    /   DBili --      07-01             Radiology:    < from: CT Abdomen and Pelvis w/ IV Cont (07.02.23 @ 00:49) >  FINDINGS:    LOWER CHEST: Subsegmental atelectasis. Coronary artery calcifications.    HEPATOBILIARY: New, ill-defined indeterminate left hepatic lobe segment   IVb hypodensity measuring approximately 2 cm (4-119). New dilated CBD   measuring up to 8 mm (previously 4 mm).    SPLEEN: Unremarkable.    PANCREAS: Slightly increased prominent/mild dilation of the proximal   pancreatic duct, 6 mm. Stable 1.3 cm hypodensity/cyst at the uncinate   process..    ADRENAL GLANDS: Unremarkable.    KIDNEYS: Symmetric renal enhancement. No hydronephrosis. No perinephric   stranding. Stable partially atrophic left kidney. Stable left upper pole   hypodensities, too small to further characterize. Retroaortic left renal   vein, normal variant    ABDOMINOPELVIC NODES: Unremarkable.    PELVIC ORGANS: Urinary bladder is unremarkable.    PERITONEUM/MESENTERY/BOWEL: No evidence of bowel obstruction, free air,   or ascites. The appendix is not definitively visualized, although there   are no inflammatory changes in the right lower quadrant.    BONES/SOFT TISSUES: Degenerative changes of the spine. No acute osseous   abnormality.      IMPRESSION:  1.  No evidence of acute abdominopelvic pathology. Specifically no CT   evidence of acute pancreatitis.  2.  Since 4/25/2023, new CBD dilation up to 8 mm (previously 4 mm) and   pancreatic duct dilation up to 6 mm with new hepatic segment IVb   ill-defined hypodensity. Stable uncinate process 1.3 cm hypodense cystic   lesion. Pancreatic protocol MRI without and with contrast recommended on   an outpatient basis to further assess these findings.    < end of copied text >    < from: CT Abdomen and Pelvis w/ IV Cont (04.25.23 @ 02:13) >  FINDINGS:    LOWER CHEST: Nofocal consolidation or pleural effusion at the lung   bases. Multivessel dense coronary artery calcifications.    LIVER: Unremarkable.    SPLEEN: Unremarkable.    PANCREAS: Stable 1.3 cm hypodensity/cyst in the uncinate process. No main   duct dilatation. There is mild haziness/fat stranding in the region of   the pancreaticoduodenal groove.    GALLBLADDER AND BILIARY TREE: Unremarkable CT appearance of the   gallbladder. No biliary ductal dilatation.    ADRENALS: Unremarkable.    KIDNEYS: Symmetric pattern of renal enhancement. No hydronephrosis.   Partially atrophic left kidney. Subcentimeter left renal hypodensity too   small to catheterize.    LYMPH NODES: There are no enlarged abdominal or pelvic lymph nodes.    VASCULATURE: Normal caliber abdominal aorta with atherosclerotic changes.    BOWEL: No bowel obstruction. Mild duodenal wall thickening.    PERITONEUM/RETROPERITONEUM/MESENTERY: There is no ascites or   pneumoperitoneum.    PELVIC VISCERA: Unremarkable.    BONES AND SOFT TISSUES: No acute osseous abnormality is noted.      IMPRESSION:    Inflammation centered in the pancreaticoduodenal groove. Favor   pancreatitis, correlate clinically. No peripancreatic fluid collections.   Suspect reactive duodenal wall thickening.    < end of copied text >    < from: US Abdomen Upper Quadrant Right (04.25.23 @ 01:49) >  FINDINGS:  Liver: Within normal limits.  Bile ducts: Normal caliber. Common bile duct measures 7 mm which is upper   limits but normal for age..  Gallbladder: Within normal limits.  Pancreas: Visualized portionsare within normal limits.  Right kidney: 10.5 cm. No hydronephrosis.  Ascites: None.  IVC: Visualized portions are within normal limits.    IMPRESSION:  Unremarkable right upper quadrant abdominal ultrasound.    < end of copied text >    < from: CT Angio Abd Aorta w/run-off w/ IV Cont (07.22.21 @ 22:41) >  FINDINGS:    VASCULATURE: No evidence of aortic dissection, intramural hematoma or aneurysmal dilation. Mixed plaque throughout aorta and its major branches. Patent mesenteric visceral arteries. Coronary artery calcifications. No evidence of central pulmonary embolism.    LUNGS, PLEURA, AIRWAYS: No lobar consolidation, mass, effusion, or pneumothorax. No evidence of central endobronchial obstruction. No bronchiectasis or honeycombing. No suspicious pulmonary nodule.    THORACIC NODES: No mediastinal, hilar, supraclavicular, or axillary lymphadenopathy.    MEDIASTINUM: No pericardial effusion. Heart size is within normal limits. No adenopathy or mass.    HEPATOBILIARY: Unremarkable.    SPLEEN: Unremarkable.    PANCREAS: Pancreatic uncinate process 1 cm hypodensity, possible side branch IPMN (series 3, image 244).    ADRENAL GLANDS: Unremarkable.    KIDNEYS: Symmetric pattern of renal enhancement. No hydronephrosis bilaterally.    ABDOMINAL NODES: No lymphadenopathy.    VISUALIZED PERITONEUM /MESENTERY/BOWEL/PELVIS: Colonic diverticulosis. No bowel obstruction. No ascites. Urinary bladder partially distended. Uterus unremarkable on CT. Post appendectomy.    BONES/SOFT TISSUES: Degenerative change of spine.    < end of copied text >

## 2023-07-03 NOTE — DISCHARGE NOTE NURSING/CASE MANAGEMENT/SOCIAL WORK - NSDCPEFALRISK_GEN_ALL_CORE
For information on Fall & Injury Prevention, visit: [email or web address] OR  [email or web address] OR  https://www.cdc.gov/steadi/patient.html

## 2023-07-03 NOTE — DISCHARGE NOTE PROVIDER - HOSPITAL COURSE
67-year-old female with past medical history of pancreatitis, CAD (x2 stent, on Plavix and ASA 81 mg), hypercholesterolemia, HTN, DM presented to the ED  with complaints of bilateral flank pain which began at 6pm and progressed until she decided to come to the ED at 9pm.  Reports symptoms are similar to what she felt at end of April when she was found to have pancreatitis and urine culture positive for ESBL.  Patient denies fever, endorses mild chills.  Denies chest pain, shortness of breath, abdominal pain, nausea/vomiting/diarrhea, dysuria/frequency/urgency/hematuria, numbness/tingling, melena/hematochezia.    Significant hospital course:     Patient presented to the ED with 3 hours of bilateral flank pain but with no nausea or vomiting. Patient was found to have elevated lipase of 240 and CT imaging showed new CBD dilation up to 8 mm (previously 4 mm) and pancreatic duct dilation up to 6 mm with new hepatic segment IVb ill-defined hypodensity. Stable uncinate process 1.3 cm hypodense cystic lesion. Patient was given appropriate pain medication (dilauded 0.5mg) and IV fluids to manage her pancreatitis. GI was consulted and recommended EUS and FNA outpatient and MRI liver as well. She was found to have a low level of magnesium of 1.2, thus was given magnesium supplement. Patient's pain was well controlled and patient was able to proceed to small solid food, such as cookie. Patient and her family member preferred to be discharged home as soon as possible, and thus with clear return instruction (any worsening pain, inability to tolerate fluids), was planned for discharge.      67-year-old female with past medical history of pancreatitis, CAD (x2 stent, on Plavix and ASA 81 mg), hypercholesterolemia, HTN, DM presented to the ED  with complaints of bilateral flank pain which began at 6pm and progressed until she decided to come to the ED at 9pm.  Reports symptoms are similar to what she felt at end of April when she was found to have pancreatitis and urine culture positive for ESBL.  Patient denies fever, endorses mild chills.  Denies chest pain, shortness of breath, abdominal pain, nausea/vomiting/diarrhea, dysuria/frequency/urgency/hematuria, numbness/tingling, melena/hematochezia.    Significant hospital course:     Patient presented to the ED with 3 hours of bilateral flank pain but with no nausea or vomiting. Patient was found to have elevated lipase of 240 and CT imaging showed new CBD dilation up to 8 mm (previously 4 mm) and pancreatic duct dilation up to 6 mm with new hepatic segment IVb ill-defined hypodensity. Stable uncinate process 1.3 cm hypodense cystic lesion. Patient was given appropriate pain medication (dilauded 0.5mg) and IV fluids to manage her pancreatitis. GI was consulted and recommended EUS and FNA outpatient and MRI liver as well. She was found to have a low level of magnesium of 1.2, thus was given magnesium supplement. Patient's pain was well controlled and patient was able to proceed to small solid food, such as cookie. Patient and her family member preferred to be discharged home as soon as possible, and thus with clear return instruction (any worsening pain, inability to tolerate fluids), was planned for discharge.     Discussed case the gastroenterology and Dr. Nam states his GI group agrees that discharge today is gene.  Patient to have outpatient EUS and FNA.      Patient discharged home.

## 2023-07-03 NOTE — DISCHARGE NOTE PROVIDER - NSDCMRMEDTOKEN_GEN_ALL_CORE_FT
aspirin 81 mg oral tablet: 1 tab(s) orally once a day  ezetimibe 10 mg oral tablet: 1 tab(s) orally once a day  Lipitor 80 mg oral tablet: 1 orally once a day (at bedtime)  lisinopril 20 mg oral tablet: 1 tab(s) orally once a day  Lyrica 150 mg oral capsule: 1 orally 2 times a day  MetFORMIN (Eqv-Fortamet) 1000 mg oral tablet, extended release: 1 orally 2 times a day  Metoprolol Tartrate 25 mg oral tablet: 1 tab(s) orally 2 times a day  pantoprazole 40 mg oral delayed release tablet: 1 tab(s) orally once a day (at bedtime)  Rybelsus 3 mg oral tablet: 1 orally once a day

## 2023-07-03 NOTE — PROGRESS NOTE ADULT - ASSESSMENT
67-year-old female with past medical history of ?pancreatitis sec to unknown etiology, CAD (x2 stent, on Plavix last dose  and ASA 81 mg), hypercholesterolemia, HTN, DM presented to the ED  with complaints of bilateral flank pain which began yesterday and progressed until she decided to come to the ED last night. GI consulted for dilated PD/CBD and pancreatic cyst.     # Pancreatic cyst with Dilated PD and new CBD dilation   # Hepatic Hypodensity  - ED visit in April with similar symptoms  - lipase 240   - Reviewed CT and RUQ US   - LFT Wnl   - 1.3 cm pancreatic cyst       CEA level  CA 19-9   Will follow  67-year-old female with past medical history of ?pancreatitis sec to unknown etiology, CAD (x2 stent, on Plavix last dose  and ASA 81 mg), hypercholesterolemia, HTN, DM presented to the ED  with complaints of bilateral flank pain which began yesterday and progressed until she decided to come to the ED last night. GI consulted for dilated PD/CBD and pancreatic cyst.     #)Pancreatic cyst with Dilated PD and new CBD dilation   #)New ill defined hypodensity in the liver of 2 cm   #)Flank pain/?abdominal pain doubt pancreatitis (pain not typical and CT negative, lipase 240)   DD: r/o pancreatic malignancy given double duct sign vs IPMN vs pancreatic pseudocyst less likely  -Hemodynamically stable  -Normal LFT   -CT Abdomen showed Since 4/25/2023, new CBD dilation up to 8 mm (previously 4 mm) and pancreatic duct dilation up to 6 mm with new hepatic segment IVb ill-defined hypodensity. Stable uncinate process 1.3 cm hypodense cystic   lesion.   -CT abdomen 4/25/2023 showed 1.3 cm hypodensity/cyst in the uncinate process. No main duct dilatation. There is mild haziness/fat stranding in the region of the pancreaticoduodenal groove. AT that time in april 2023 pain not typical and lipase 61  -US in 4/2023 showed CBD 7mm, no gall stones   -CTA 2021 showed 1 cm pancreatic uncinate process hypodensity likely IPMN  -denies any family h/o pancreatic cancer     Recs:   low fat diet as tolerated   taper IV fluids   trend LFT  check tumor markers CEA, CA 19-9  Hold plavix for 5 days for EUS   67-year-old female with past medical history of ?pancreatitis sec to unknown etiology, CAD (x2 stent, on Plavix last dose  and ASA 81 mg), hypercholesterolemia, HTN, DM presented to the ED  with complaints of bilateral flank pain which began yesterday and progressed until she decided to come to the ED last night. GI consulted for dilated PD/CBD and pancreatic cyst.     #)Pancreatic cyst with Dilated PD and new CBD dilation   #)New ill defined hypodensity in the liver of 2 cm   #)Flank pain/?abdominal pain ?pancreatitis (pain not typical and CT negative, lipase 240) etiology likely due to medication semaglutide (no alcohol, no gall stones, )   DD: r/o pancreatic malignancy given double duct sign vs IPMN vs pancreatic pseudocyst less likely  -Hemodynamically stable  -Normal LFT   -CT Abdomen showed Since 4/25/2023, new CBD dilation up to 8 mm (previously 4 mm) and pancreatic duct dilation up to 6 mm with new hepatic segment IVb ill-defined hypodensity. Stable uncinate process 1.3 cm hypodense cystic   lesion.   -CT abdomen 4/25/2023 showed 1.3 cm hypodensity/cyst in the uncinate process. No main duct dilatation. There is mild haziness/fat stranding in the region of the pancreaticoduodenal groove. AT that time in april 2023 pain not typical and lipase 61  -US in 4/2023 showed CBD 7mm, no gall stones   -CTA 2021 showed 1 cm pancreatic uncinate process hypodensity likely IPMN  -denies any family h/o pancreatic cancer     Recs:   low fat diet as tolerated   taper IV fluids   trend LFT  check tumor markers CEA, CA 19-9  hold plavix   will schedule for EUS as an OP this wednesday or thursday    67-year-old female with past medical history of ?pancreatitis sec to unknown etiology, CAD (x2 stent, on Plavix last dose  and ASA 81 mg), hypercholesterolemia, HTN, DM presented to the ED  with complaints of bilateral flank pain which began yesterday and progressed until she decided to come to the ED last night. GI consulted for dilated PD/CBD and pancreatic cyst.     no fam hx of panc ca or other GI cancers  no hx of tobacco  no recent meds but was on janumet and recently started semaglutide PO (after her prior presentation w/ ? pancreatitis)  no OTC or herbal meds  no significant unintentional weight loss    #)Pancreatic cyst with Dilated PD and new CBD dilation   #)New ill defined hypodensity in the liver of 2 cm   #)Flank pain/?abdominal pain ?pancreatitis (pain not typical and CT negative, lipase 240)   ddx includes microlithiasis (not seen on RUQ US or CT) vs panc divisum vs medication semaglutide (no alcohol, no gall stones, ) vs hyperTG vs autoimmune vs ampullary or panc head mass (unlikely as pt had dilation on CT from 2021)   DD: r/o pancreatic malignancy given double duct sign vs IPMN vs pancreatic pseudocyst less likely  -Hemodynamically stable  -Normal LFT   -CT Abdomen showed Since 4/25/2023, new CBD dilation up to 8 mm (previously 4 mm) and pancreatic duct dilation up to 6 mm with new hepatic segment IVb ill-defined hypodensity. Stable uncinate process 1.3 cm hypodense cystic lesion.   -CT abdomen 4/25/2023 showed 1.3 cm hypodensity/cyst in the uncinate process. No main duct dilatation. There is mild haziness/fat stranding in the region of the pancreaticoduodenal groove. AT that time in april 2023 pain not typical, but similar to current pain and lipase 61  -US in 4/2023 showed CBD 7mm, no gall stones   -CTA 2021 showed 1 cm pancreatic uncinate process hypodensity likely IPMN - PD was dilated as well on that exam    Recs:   low fat diet as tolerated   taper IV fluids   trend LFT  check tumor markers CEA, CA 19-9  hold plavix   will schedule for EUS as an OP this wednesday or thursday   pt will need outpatient MRI liver protocol for further eval of liver lesion

## 2023-07-03 NOTE — PROGRESS NOTE ADULT - ATTENDING COMMENTS
I edited the note.   Time-based billing (NON-critical care).   54 minutes spent on total encounter; more than 50% of the visit was spent counseling and / or coordinating care by the attending physician.  The necessity of the time spent during the encounter on this date of service was due to: Coordination of care.

## 2023-07-03 NOTE — DISCHARGE NOTE PROVIDER - NSDCCPCAREPLAN_GEN_ALL_CORE_FT
PRINCIPAL DISCHARGE DIAGNOSIS  Diagnosis: Pancreatitis  Assessment and Plan of Treatment: With the abdominal pain, increased lab value (lipse), and imaging (CT abdomen), you were diagnosed with pancreatitis. Your pain was controlled with pain medication and gastroenterology team met with you to discuss about endoscopy and biopsy. Please return to the hospital if you have any worsening pain or inability to drink or eat anything, nausea or vomiting.      SECONDARY DISCHARGE DIAGNOSES  Diagnosis: Hepatic lesion  Assessment and Plan of Treatment:     Diagnosis: Intractable abdominal pain  Assessment and Plan of Treatment:

## 2023-07-03 NOTE — DISCHARGE NOTE PROVIDER - CARE PROVIDER_API CALL
Peng Leos  Gastroenterology  [address] [place] [id number]  Phone: [phone]  Fax: [phone]  Follow Up Time:

## 2023-07-03 NOTE — DISCHARGE NOTE NURSING/CASE MANAGEMENT/SOCIAL WORK - PATIENT PORTAL LINK FT
You can access the FollowMyHealth Patient Portal offered by [facility] by registering at the following website: [email or web address]. By joining [employer]’s FollowMyHealth portal, you will also be able to view your health information using other applications (apps) compatible with our system.

## 2023-07-04 LAB
CULTURE RESULTS: SIGNIFICANT CHANGE UP
SPECIMEN SOURCE: SIGNIFICANT CHANGE UP

## 2023-07-05 ENCOUNTER — TRANSCRIPTION ENCOUNTER (OUTPATIENT)
Age: 68
End: 2023-07-05

## 2023-07-06 ENCOUNTER — OUTPATIENT (OUTPATIENT)
Dept: INPATIENT UNIT | Facility: HOSPITAL | Age: 68
LOS: 1 days | Discharge: HOME | End: 2023-07-06
Payer: MEDICARE

## 2023-07-06 ENCOUNTER — TRANSCRIPTION ENCOUNTER (OUTPATIENT)
Age: 68
End: 2023-07-06

## 2023-07-06 ENCOUNTER — RESULT REVIEW (OUTPATIENT)
Age: 68
End: 2023-07-06

## 2023-07-06 VITALS
HEART RATE: 72 BPM | HEIGHT: 66 IN | RESPIRATION RATE: 18 BRPM | DIASTOLIC BLOOD PRESSURE: 75 MMHG | WEIGHT: 192.02 LBS | TEMPERATURE: 97 F | SYSTOLIC BLOOD PRESSURE: 144 MMHG

## 2023-07-06 VITALS
HEART RATE: 73 BPM | DIASTOLIC BLOOD PRESSURE: 66 MMHG | RESPIRATION RATE: 22 BRPM | OXYGEN SATURATION: 96 % | SYSTOLIC BLOOD PRESSURE: 146 MMHG

## 2023-07-06 DIAGNOSIS — Z98.890 OTHER SPECIFIED POSTPROCEDURAL STATES: Chronic | ICD-10-CM

## 2023-07-06 DIAGNOSIS — Z98.61 CORONARY ANGIOPLASTY STATUS: Chronic | ICD-10-CM

## 2023-07-06 DIAGNOSIS — R10.9 UNSPECIFIED ABDOMINAL PAIN: ICD-10-CM

## 2023-07-06 PROCEDURE — 43239 EGD BIOPSY SINGLE/MULTIPLE: CPT | Mod: XU

## 2023-07-06 PROCEDURE — 88312 SPECIAL STAINS GROUP 1: CPT

## 2023-07-06 PROCEDURE — 88312 SPECIAL STAINS GROUP 1: CPT | Mod: 26

## 2023-07-06 PROCEDURE — 88305 TISSUE EXAM BY PATHOLOGIST: CPT

## 2023-07-06 PROCEDURE — 43237 ENDOSCOPIC US EXAM ESOPH: CPT

## 2023-07-06 PROCEDURE — 88305 TISSUE EXAM BY PATHOLOGIST: CPT | Mod: 26

## 2023-07-06 NOTE — ASU PREOP CHECKLIST - ALLERGY BAND ON
2019     Haresh Murphy (:  1945) is a 75 y.o. female, here for evaluation of the following medical concerns:      Chief Complaint   Patient presents with    Atrial Flutter     Pt states she has a fluttering in her chest since she woke up this morning         HPI      Heart flutter and CP since this morning   Woke up this normal with it, around 7 am   SOB is worse today but chronic , since pt has lung cancer   States O2 sat has been around 83 at home   States she had crackles last week, diagnosed by palliative Care, and started on doxy by the NP         Review of Systems   Constitutional: Negative for activity change, appetite change, chills, fatigue and fever. HENT: Negative for congestion. Respiratory: Positive for cough and shortness of breath. Negative for chest tightness, wheezing and stridor. Cardiovascular: Positive for chest pain and palpitations. Negative for leg swelling. Prior to Visit Medications    Medication Sig Taking? Authorizing Provider   ipratropium-albuterol (DUONEB) 0.5-2.5 (3) MG/3ML SOLN nebulizer solution Inhale 1 vial into the lungs every 6 hours as needed for Shortness of Breath Yes Historical Provider, MD   doxycycline hyclate (VIBRAMYCIN) 100 MG capsule Take 1 capsule by mouth 2 times daily for 10 days Yes KRISSY Lowery CNP   predniSONE (DELTASONE) 10 MG tablet Take 1 tablet by mouth daily for 12 days Take 4 tabs x 3 days then 3 tabs x 3 days then 2 tabs x 3 days then 1 tab x 3 days. Yes KRISSY Lowery CNP   HYDROcodone-acetaminophen (NORCO) 5-325 MG per tablet Take 1 tablet by mouth every 6 hours as needed for Pain (or sob) for up to 30 days.  Yes KRISSY Lowery CNP   polyethylene glycol (GLYCOLAX) powder Take 17 g by mouth daily Yes Historical Provider, MD   amLODIPine (NORVASC) 5 MG tablet Take 1 tablet by mouth daily Yes DERECK Puente   OXYGEN Inhale 4 L into the lungs daily Yes Historical Provider, MD   aspirin 81 MG chewable tablet Take 1 tablet by mouth daily Yes Vidhi Alonzo MD   guaiFENesin (MUCINEX) 600 MG extended release tablet Take 1 tablet by mouth 2 times daily Yes Vidhi Alonzo MD   pantoprazole (PROTONIX) 40 MG tablet Take 1 tablet by mouth daily Yes Vidhi Alonzo MD   SENNA PO Take 2 tablets by mouth nightly  Yes Historical Provider, MD   albuterol (PROVENTIL) (2.5 MG/3ML) 0.083% nebulizer solution Take 3 mLs by nebulization every 6 hours as needed for Wheezing Yes Renuka Han, DO   magnesium hydroxide (MILK OF MAGNESIA) 400 MG/5ML suspension Take 30 mLs by mouth daily as needed for Constipation Yes Historical Provider, MD   folic acid (FOLVITE) 1 MG tablet Take 1 tablet by mouth daily Yes Sai Combs, DO   Multiple Vitamins-Minerals (CENTRUM SILVER 50+WOMEN) TABS Take 1 tablet by mouth daily Yes Historical Provider, MD   tiotropium (SPIRIVA RESPIMAT) 2.5 MCG/ACT AERS inhaler Inhale 2 puffs into the lungs daily Yes Dajuan Lopez MD        Social History     Tobacco Use    Smoking status: Former Smoker     Packs/day: 1.00     Years: 60.00     Pack years: 60.00     Types: Cigarettes     Start date: 1/10/1964     Last attempt to quit: 2019     Years since quittin.4    Smokeless tobacco: Never Used    Tobacco comment: quit in 2019   Substance Use Topics    Alcohol use: Not Currently     Alcohol/week: 9.0 oz     Types: 15 Cans of beer per week     Comment: per pt has not had any since Dec 28 2018        Vitals:    19 1442   BP: 104/60   Site: Right Upper Arm   Position: Sitting   Cuff Size: Medium Adult   Pulse: 112   Temp: 98.1 °F (36.7 °C)   TempSrc: Oral   SpO2: 93%  Comment: on 4 L   Weight: Comment: Unable to weigh today   Height: 5' (1.524 m)     Estimated body mass index is 19.92 kg/m² as calculated from the following:    Height as of this encounter: 5' (1.524 m). Weight as of 19: 102 lb (46.3 kg).     Physical Exam   Constitutional: She no known allergies

## 2023-07-06 NOTE — ASU DISCHARGE PLAN (ADULT/PEDIATRIC) - NS MD DC FALL RISK RISK
For information on Fall & Injury Prevention, visit: [email or web address] OR  [email or web address] OR  https://www.cdc.gov/steadi/patient.html

## 2023-07-07 LAB — SURGICAL PATHOLOGY STUDY: SIGNIFICANT CHANGE UP

## 2023-07-10 DIAGNOSIS — K83.8 OTHER SPECIFIED DISEASES OF BILIARY TRACT: ICD-10-CM

## 2023-07-10 DIAGNOSIS — E78.00 PURE HYPERCHOLESTEROLEMIA, UNSPECIFIED: ICD-10-CM

## 2023-07-10 DIAGNOSIS — E11.9 TYPE 2 DIABETES MELLITUS WITHOUT COMPLICATIONS: ICD-10-CM

## 2023-07-10 DIAGNOSIS — I25.10 ATHEROSCLEROTIC HEART DISEASE OF NATIVE CORONARY ARTERY WITHOUT ANGINA PECTORIS: ICD-10-CM

## 2023-07-10 DIAGNOSIS — K85.90 ACUTE PANCREATITIS WITHOUT NECROSIS OR INFECTION, UNSPECIFIED: ICD-10-CM

## 2023-07-10 DIAGNOSIS — K29.80 DUODENITIS WITHOUT BLEEDING: ICD-10-CM

## 2023-07-10 DIAGNOSIS — K76.9 LIVER DISEASE, UNSPECIFIED: ICD-10-CM

## 2023-07-10 DIAGNOSIS — Z79.01 LONG TERM (CURRENT) USE OF ANTICOAGULANTS: ICD-10-CM

## 2023-07-10 DIAGNOSIS — E83.42 HYPOMAGNESEMIA: ICD-10-CM

## 2023-07-10 DIAGNOSIS — R10.9 UNSPECIFIED ABDOMINAL PAIN: ICD-10-CM

## 2023-07-10 DIAGNOSIS — K29.50 UNSPECIFIED CHRONIC GASTRITIS WITHOUT BLEEDING: ICD-10-CM

## 2023-07-10 DIAGNOSIS — I10 ESSENTIAL (PRIMARY) HYPERTENSION: ICD-10-CM

## 2023-07-10 DIAGNOSIS — Z95.5 PRESENCE OF CORONARY ANGIOPLASTY IMPLANT AND GRAFT: ICD-10-CM

## 2023-07-10 DIAGNOSIS — Z79.82 LONG TERM (CURRENT) USE OF ASPIRIN: ICD-10-CM

## 2023-07-10 DIAGNOSIS — Z79.84 LONG TERM (CURRENT) USE OF ORAL HYPOGLYCEMIC DRUGS: ICD-10-CM

## 2023-07-10 DIAGNOSIS — Z56.0 UNEMPLOYMENT, UNSPECIFIED: ICD-10-CM

## 2023-07-10 DIAGNOSIS — K44.9 DIAPHRAGMATIC HERNIA WITHOUT OBSTRUCTION OR GANGRENE: ICD-10-CM

## 2023-07-10 DIAGNOSIS — K86.2 CYST OF PANCREAS: ICD-10-CM

## 2023-07-10 DIAGNOSIS — I25.2 OLD MYOCARDIAL INFARCTION: ICD-10-CM

## 2023-07-10 SDOH — ECONOMIC STABILITY - INCOME SECURITY: UNEMPLOYMENT, UNSPECIFIED: Z56.0

## 2023-07-11 ENCOUNTER — APPOINTMENT (OUTPATIENT)
Dept: SURGERY | Facility: CLINIC | Age: 68
End: 2023-07-11
Payer: MEDICARE

## 2023-07-11 VITALS
TEMPERATURE: 97 F | HEIGHT: 65 IN | HEART RATE: 79 BPM | SYSTOLIC BLOOD PRESSURE: 118 MMHG | DIASTOLIC BLOOD PRESSURE: 76 MMHG | OXYGEN SATURATION: 97 % | WEIGHT: 201 LBS | BODY MASS INDEX: 33.49 KG/M2

## 2023-07-11 PROCEDURE — 99205 OFFICE O/P NEW HI 60 MIN: CPT

## 2023-07-12 ENCOUNTER — NON-APPOINTMENT (OUTPATIENT)
Age: 68
End: 2023-07-12

## 2023-07-17 ENCOUNTER — APPOINTMENT (OUTPATIENT)
Dept: CARDIOLOGY | Facility: CLINIC | Age: 68
End: 2023-07-17
Payer: MEDICARE

## 2023-07-17 ENCOUNTER — RESULT CHARGE (OUTPATIENT)
Age: 68
End: 2023-07-17

## 2023-07-17 ENCOUNTER — OUTPATIENT (OUTPATIENT)
Dept: OUTPATIENT SERVICES | Facility: HOSPITAL | Age: 68
LOS: 1 days | End: 2023-07-17
Payer: MEDICARE

## 2023-07-17 VITALS
SYSTOLIC BLOOD PRESSURE: 121 MMHG | TEMPERATURE: 98 F | HEART RATE: 73 BPM | HEIGHT: 66 IN | WEIGHT: 192.02 LBS | DIASTOLIC BLOOD PRESSURE: 67 MMHG | OXYGEN SATURATION: 98 % | RESPIRATION RATE: 15 BRPM

## 2023-07-17 VITALS
HEART RATE: 76 BPM | DIASTOLIC BLOOD PRESSURE: 68 MMHG | SYSTOLIC BLOOD PRESSURE: 108 MMHG | BODY MASS INDEX: 33.49 KG/M2 | HEIGHT: 65 IN | WEIGHT: 201 LBS

## 2023-07-17 DIAGNOSIS — Z98.61 CORONARY ANGIOPLASTY STATUS: Chronic | ICD-10-CM

## 2023-07-17 DIAGNOSIS — E78.5 HYPERLIPIDEMIA, UNSPECIFIED: ICD-10-CM

## 2023-07-17 DIAGNOSIS — I10 ESSENTIAL (PRIMARY) HYPERTENSION: ICD-10-CM

## 2023-07-17 DIAGNOSIS — I25.10 ATHEROSCLEROTIC HEART DISEASE OF NATIVE CORONARY ARTERY W/OUT ANGINA PECTORIS: ICD-10-CM

## 2023-07-17 DIAGNOSIS — Z01.810 ENCOUNTER FOR PREPROCEDURAL CARDIOVASCULAR EXAMINATION: ICD-10-CM

## 2023-07-17 DIAGNOSIS — E11.59 TYPE 2 DIABETES MELLITUS WITH OTHER CIRCULATORY COMPLICATIONS: ICD-10-CM

## 2023-07-17 DIAGNOSIS — K85.10 BILIARY ACUTE PANCREATITIS WITHOUT NECROSIS OR INFECTION: ICD-10-CM

## 2023-07-17 DIAGNOSIS — Z98.890 OTHER SPECIFIED POSTPROCEDURAL STATES: Chronic | ICD-10-CM

## 2023-07-17 DIAGNOSIS — Z01.818 ENCOUNTER FOR OTHER PREPROCEDURAL EXAMINATION: ICD-10-CM

## 2023-07-17 DIAGNOSIS — Z98.61 ATHEROSCLEROTIC HEART DISEASE OF NATIVE CORONARY ARTERY W/OUT ANGINA PECTORIS: ICD-10-CM

## 2023-07-17 LAB
A1C WITH ESTIMATED AVERAGE GLUCOSE RESULT: 8.9 % — HIGH (ref 4–5.6)
ALBUMIN SERPL ELPH-MCNC: 4.9 G/DL — SIGNIFICANT CHANGE UP (ref 3.5–5.2)
ALP SERPL-CCNC: 109 U/L — SIGNIFICANT CHANGE UP (ref 30–115)
ALT FLD-CCNC: 19 U/L — SIGNIFICANT CHANGE UP (ref 0–41)
ANION GAP SERPL CALC-SCNC: 17 MMOL/L — HIGH (ref 7–14)
APTT BLD: 30.3 SEC — SIGNIFICANT CHANGE UP (ref 27–39.2)
AST SERPL-CCNC: 27 U/L — SIGNIFICANT CHANGE UP (ref 0–41)
BASOPHILS # BLD AUTO: 0.07 K/UL — SIGNIFICANT CHANGE UP (ref 0–0.2)
BASOPHILS NFR BLD AUTO: 1.4 % — HIGH (ref 0–1)
BILIRUB SERPL-MCNC: 0.3 MG/DL — SIGNIFICANT CHANGE UP (ref 0.2–1.2)
BUN SERPL-MCNC: 18 MG/DL — SIGNIFICANT CHANGE UP (ref 10–20)
CALCIUM SERPL-MCNC: 10.1 MG/DL — SIGNIFICANT CHANGE UP (ref 8.4–10.5)
CHLORIDE SERPL-SCNC: 103 MMOL/L — SIGNIFICANT CHANGE UP (ref 98–110)
CO2 SERPL-SCNC: 21 MMOL/L — SIGNIFICANT CHANGE UP (ref 17–32)
CREAT SERPL-MCNC: 1.1 MG/DL — SIGNIFICANT CHANGE UP (ref 0.7–1.5)
EGFR: 55 ML/MIN/1.73M2 — LOW
EOSINOPHIL # BLD AUTO: 0.11 K/UL — SIGNIFICANT CHANGE UP (ref 0–0.7)
EOSINOPHIL NFR BLD AUTO: 2.2 % — SIGNIFICANT CHANGE UP (ref 0–8)
ESTIMATED AVERAGE GLUCOSE: 209 MG/DL — HIGH (ref 68–114)
GLUCOSE SERPL-MCNC: 112 MG/DL — HIGH (ref 70–99)
HCT VFR BLD CALC: 39.7 % — SIGNIFICANT CHANGE UP (ref 37–47)
HGB BLD-MCNC: 13 G/DL — SIGNIFICANT CHANGE UP (ref 12–16)
IMM GRANULOCYTES NFR BLD AUTO: 0 % — LOW (ref 0.1–0.3)
INR BLD: 0.93 RATIO — SIGNIFICANT CHANGE UP (ref 0.65–1.3)
LYMPHOCYTES # BLD AUTO: 3.05 K/UL — SIGNIFICANT CHANGE UP (ref 1.2–3.4)
LYMPHOCYTES # BLD AUTO: 61.6 % — HIGH (ref 20.5–51.1)
MCHC RBC-ENTMCNC: 27.1 PG — SIGNIFICANT CHANGE UP (ref 27–31)
MCHC RBC-ENTMCNC: 32.7 G/DL — SIGNIFICANT CHANGE UP (ref 32–37)
MCV RBC AUTO: 82.9 FL — SIGNIFICANT CHANGE UP (ref 81–99)
MONOCYTES # BLD AUTO: 0.35 K/UL — SIGNIFICANT CHANGE UP (ref 0.1–0.6)
MONOCYTES NFR BLD AUTO: 7.1 % — SIGNIFICANT CHANGE UP (ref 1.7–9.3)
NEUTROPHILS # BLD AUTO: 1.37 K/UL — LOW (ref 1.4–6.5)
NEUTROPHILS NFR BLD AUTO: 27.7 % — LOW (ref 42.2–75.2)
NRBC # BLD: 0 /100 WBCS — SIGNIFICANT CHANGE UP (ref 0–0)
PLATELET # BLD AUTO: 356 K/UL — SIGNIFICANT CHANGE UP (ref 130–400)
PMV BLD: 9.2 FL — SIGNIFICANT CHANGE UP (ref 7.4–10.4)
POTASSIUM SERPL-MCNC: 4.8 MMOL/L — SIGNIFICANT CHANGE UP (ref 3.5–5)
POTASSIUM SERPL-SCNC: 4.8 MMOL/L — SIGNIFICANT CHANGE UP (ref 3.5–5)
PROT SERPL-MCNC: 8.1 G/DL — HIGH (ref 6–8)
PROTHROM AB SERPL-ACNC: 10.6 SEC — SIGNIFICANT CHANGE UP (ref 9.95–12.87)
RBC # BLD: 4.79 M/UL — SIGNIFICANT CHANGE UP (ref 4.2–5.4)
RBC # FLD: 13.7 % — SIGNIFICANT CHANGE UP (ref 11.5–14.5)
SODIUM SERPL-SCNC: 141 MMOL/L — SIGNIFICANT CHANGE UP (ref 135–146)
WBC # BLD: 4.95 K/UL — SIGNIFICANT CHANGE UP (ref 4.8–10.8)
WBC # FLD AUTO: 4.95 K/UL — SIGNIFICANT CHANGE UP (ref 4.8–10.8)

## 2023-07-17 PROCEDURE — 99214 OFFICE O/P EST MOD 30 MIN: CPT | Mod: 25

## 2023-07-17 PROCEDURE — 80053 COMPREHEN METABOLIC PANEL: CPT

## 2023-07-17 PROCEDURE — 85610 PROTHROMBIN TIME: CPT

## 2023-07-17 PROCEDURE — 36415 COLL VENOUS BLD VENIPUNCTURE: CPT

## 2023-07-17 PROCEDURE — 85025 COMPLETE CBC W/AUTO DIFF WBC: CPT

## 2023-07-17 PROCEDURE — 93005 ELECTROCARDIOGRAM TRACING: CPT

## 2023-07-17 PROCEDURE — 85730 THROMBOPLASTIN TIME PARTIAL: CPT

## 2023-07-17 PROCEDURE — 83036 HEMOGLOBIN GLYCOSYLATED A1C: CPT

## 2023-07-17 PROCEDURE — 93010 ELECTROCARDIOGRAM REPORT: CPT

## 2023-07-17 PROCEDURE — 93000 ELECTROCARDIOGRAM COMPLETE: CPT | Mod: NC

## 2023-07-17 RX ORDER — PANTOPRAZOLE 40 MG/1
40 TABLET, DELAYED RELEASE ORAL DAILY
Refills: 0 | Status: ACTIVE | COMMUNITY

## 2023-07-17 RX ORDER — ORAL SEMAGLUTIDE 3 MG/1
3 TABLET ORAL
Refills: 0 | Status: ACTIVE | COMMUNITY
Start: 2023-07-17

## 2023-07-17 RX ORDER — METFORMIN HYDROCHLORIDE 1000 MG/1
1000 TABLET, COATED ORAL
Refills: 0 | Status: ACTIVE | COMMUNITY
Start: 2023-07-17

## 2023-07-17 NOTE — HISTORY OF PRESENT ILLNESS
[FreeTextEntry1] : 66 y/o female with history of DM, HTN, DL, CAD, s/p STEMI, PCI or RCA, subsequent PCI of D1, presents for f/u.  She reports compliance with medical therapy.  LV function was preserved.  Pt denies chest pain,  SOB with exertion, no dizziness, no palpitations.  Pt did not wear an even monitor that was ordered 1 year ago, no further palpitations.  Labs are monitored at PCP office. B/P is controlled.  As per pt she is taking Atorvastatin 80mg.  As per pt she is mostly sedentary due to B Knee pain.   S/p hospitalization due to cholecystitis and pancreatitis \par 07/3/23 HgbA1C 9.1 Pt needs pre-op clearance for cholecystectomy

## 2023-07-17 NOTE — H&P PST ADULT - HISTORY OF PRESENT ILLNESS
PT PRESENTS TO PAST WITH NO SOB, CP, PALPITATIONS, DYSURIA, UTI OR URI AT PRESENT.   PT ABLE TO WALK UP 2-3 FLIGHTS OF STEPS WITH NO SOB-SLOWLY.   AS PER THE PT, THIS IS HIS/HER COMPLETE MEDICAL AND SURGICAL HX, INCLUDING MEDICATIONS PRESCRIBED AND OVER THE COUNTER  pt denies any covid s/s, YES  3 YRS AGO-   tested positive in the past  pt advised self quarantine till day of procedure  denies travel outside the [place] in the past 30 days  Anesthesia Alert  NO--Difficult Airway  NO--History of neck surgery or radiation  NO--Limited ROM of neck  NO--History of Malignant hyperthermia  NO--Personal or family history of Pseudocholinesterase deficiency  NO--Prior Anesthesia Complication  NO--Latex Allergy  NO--Loose teeth  NO--History of Rheumatoid Arthritis  NO--FARZANA  NO BLEEDING RISK  NO--Other_____

## 2023-07-17 NOTE — ASSESSMENT
[FreeTextEntry1] : CAD, s/p PCI.\par Dyslipidemia, diabetes.\par C/w medical therapy.\par Diet, weight loss discussed. Continued wt. loss encouraged.\par If stable will continue with secondary prevention, DM control. FS are normal as per daughter - f/u HgA1c with the PMD.\par No cardiac contraindications for the planned surgery.\par \par Patient advised to continue with her current DM regimen and to see her PMD to repeat labs\par \par Pre-op forms filled out.\par \par \par \par F/u in 6 months.

## 2023-07-17 NOTE — H&P PST ADULT - REASON FOR ADMISSION
roceduralist: Abram Chow  Procedure: ROBOTIC POSSIBLE OPEN CHOLECYSTECTOMY WITH INTRAOPERATIVE CHOLANGIOGRAM  Procedure: 90 Minutes  Anesthesia Type: General  PT STATES--I HAVE HAD PAIN FOR A FEW MONTHS.   THE PAIN IS 4/5. RECENTLY THE PAIN HAS INCREASED.  PT POINTS TO HER RIGHT UPPER QUADRANT, STATING THIS IS WHERE THE PAIN IS.  NOTHING HELPS WITH THE PAIN.

## 2023-07-17 NOTE — H&P PST ADULT - NSICDXPASTMEDICALHX_GEN_ALL_CORE_FT
PAST MEDICAL HISTORY:  CAD (coronary artery disease)     DM (diabetes mellitus)     H/O: obesity     HTN (hypertension)     Hypercholesterolemia     ST elevation myocardial infarction (STEMI), unspecified artery

## 2023-07-18 DIAGNOSIS — Z01.818 ENCOUNTER FOR OTHER PREPROCEDURAL EXAMINATION: ICD-10-CM

## 2023-07-18 DIAGNOSIS — K85.10 BILIARY ACUTE PANCREATITIS WITHOUT NECROSIS OR INFECTION: ICD-10-CM

## 2023-07-21 ENCOUNTER — EMERGENCY (EMERGENCY)
Facility: HOSPITAL | Age: 68
LOS: 0 days | Discharge: HOME | End: 2023-07-22
Attending: STUDENT IN AN ORGANIZED HEALTH CARE EDUCATION/TRAINING PROGRAM
Payer: MEDICARE

## 2023-07-21 VITALS
HEART RATE: 85 BPM | SYSTOLIC BLOOD PRESSURE: 119 MMHG | OXYGEN SATURATION: 98 % | TEMPERATURE: 98 F | WEIGHT: 192.02 LBS | RESPIRATION RATE: 20 BRPM | HEIGHT: 66 IN | DIASTOLIC BLOOD PRESSURE: 61 MMHG

## 2023-07-21 DIAGNOSIS — Z79.02 LONG TERM (CURRENT) USE OF ANTITHROMBOTICS/ANTIPLATELETS: ICD-10-CM

## 2023-07-21 DIAGNOSIS — Z98.61 CORONARY ANGIOPLASTY STATUS: Chronic | ICD-10-CM

## 2023-07-21 DIAGNOSIS — R10.11 RIGHT UPPER QUADRANT PAIN: ICD-10-CM

## 2023-07-21 DIAGNOSIS — E11.9 TYPE 2 DIABETES MELLITUS WITHOUT COMPLICATIONS: ICD-10-CM

## 2023-07-21 DIAGNOSIS — R11.0 NAUSEA: ICD-10-CM

## 2023-07-21 DIAGNOSIS — I25.2 OLD MYOCARDIAL INFARCTION: ICD-10-CM

## 2023-07-21 DIAGNOSIS — Z79.82 LONG TERM (CURRENT) USE OF ASPIRIN: ICD-10-CM

## 2023-07-21 DIAGNOSIS — R10.13 EPIGASTRIC PAIN: ICD-10-CM

## 2023-07-21 DIAGNOSIS — K52.9 NONINFECTIVE GASTROENTERITIS AND COLITIS, UNSPECIFIED: ICD-10-CM

## 2023-07-21 DIAGNOSIS — Z98.890 OTHER SPECIFIED POSTPROCEDURAL STATES: Chronic | ICD-10-CM

## 2023-07-21 DIAGNOSIS — E78.00 PURE HYPERCHOLESTEROLEMIA, UNSPECIFIED: ICD-10-CM

## 2023-07-21 DIAGNOSIS — Z79.84 LONG TERM (CURRENT) USE OF ORAL HYPOGLYCEMIC DRUGS: ICD-10-CM

## 2023-07-21 DIAGNOSIS — Z95.5 PRESENCE OF CORONARY ANGIOPLASTY IMPLANT AND GRAFT: ICD-10-CM

## 2023-07-21 DIAGNOSIS — I10 ESSENTIAL (PRIMARY) HYPERTENSION: ICD-10-CM

## 2023-07-21 DIAGNOSIS — I25.10 ATHEROSCLEROTIC HEART DISEASE OF NATIVE CORONARY ARTERY WITHOUT ANGINA PECTORIS: ICD-10-CM

## 2023-07-21 DIAGNOSIS — Z87.39 PERSONAL HISTORY OF OTHER DISEASES OF THE MUSCULOSKELETAL SYSTEM AND CONNECTIVE TISSUE: ICD-10-CM

## 2023-07-21 DIAGNOSIS — Z90.49 ACQUIRED ABSENCE OF OTHER SPECIFIED PARTS OF DIGESTIVE TRACT: ICD-10-CM

## 2023-07-21 PROCEDURE — 81001 URINALYSIS AUTO W/SCOPE: CPT

## 2023-07-21 PROCEDURE — 96375 TX/PRO/DX INJ NEW DRUG ADDON: CPT

## 2023-07-21 PROCEDURE — 99285 EMERGENCY DEPT VISIT HI MDM: CPT | Mod: 25

## 2023-07-21 PROCEDURE — 99285 EMERGENCY DEPT VISIT HI MDM: CPT

## 2023-07-21 PROCEDURE — 76705 ECHO EXAM OF ABDOMEN: CPT

## 2023-07-21 PROCEDURE — 84484 ASSAY OF TROPONIN QUANT: CPT

## 2023-07-21 PROCEDURE — 36415 COLL VENOUS BLD VENIPUNCTURE: CPT

## 2023-07-21 PROCEDURE — 80053 COMPREHEN METABOLIC PANEL: CPT

## 2023-07-21 PROCEDURE — 85025 COMPLETE CBC W/AUTO DIFF WBC: CPT

## 2023-07-21 PROCEDURE — 74177 CT ABD & PELVIS W/CONTRAST: CPT | Mod: MA

## 2023-07-21 PROCEDURE — 93005 ELECTROCARDIOGRAM TRACING: CPT

## 2023-07-21 PROCEDURE — 96374 THER/PROPH/DIAG INJ IV PUSH: CPT

## 2023-07-21 PROCEDURE — 93010 ELECTROCARDIOGRAM REPORT: CPT

## 2023-07-21 PROCEDURE — 83690 ASSAY OF LIPASE: CPT

## 2023-07-21 RX ORDER — SODIUM CHLORIDE 9 MG/ML
1000 INJECTION INTRAMUSCULAR; INTRAVENOUS; SUBCUTANEOUS ONCE
Refills: 0 | Status: COMPLETED | OUTPATIENT
Start: 2023-07-21 | End: 2023-07-21

## 2023-07-21 RX ORDER — KETOROLAC TROMETHAMINE 30 MG/ML
30 SYRINGE (ML) INJECTION ONCE
Refills: 0 | Status: DISCONTINUED | OUTPATIENT
Start: 2023-07-21 | End: 2023-07-21

## 2023-07-21 RX ORDER — ONDANSETRON 8 MG/1
4 TABLET, FILM COATED ORAL ONCE
Refills: 0 | Status: COMPLETED | OUTPATIENT
Start: 2023-07-21 | End: 2023-07-21

## 2023-07-21 RX ADMIN — ONDANSETRON 4 MILLIGRAM(S): 8 TABLET, FILM COATED ORAL at 23:45

## 2023-07-21 RX ADMIN — Medication 30 MILLIGRAM(S): at 23:45

## 2023-07-21 RX ADMIN — SODIUM CHLORIDE 1000 MILLILITER(S): 9 INJECTION INTRAMUSCULAR; INTRAVENOUS; SUBCUTANEOUS at 23:45

## 2023-07-21 NOTE — ED ADULT NURSE NOTE - NSFALLUNIVINTERV_ED_ALL_ED
Bed/Stretcher in lowest position, wheels locked, appropriate side rails in place/Call bell, personal items and telephone in reach/Instruct patient to call for assistance before getting out of bed/chair/stretcher/Non-slip footwear applied when patient is off stretcher/[place] to call system/Physically safe environment - no spills, clutter or unnecessary equipment/Purposeful proactive rounding/Room/bathroom lighting operational, light cord in reach

## 2023-07-21 NOTE — ED CLERICAL - NS ED CLERK NOTE PRE-ARRIVAL INFORMATION; ADDITIONAL PRE-ARRIVAL INFORMATION
This patient is enrolled in the STARS readmission reduction initiative and has active care navigation. This patient can be followed up by the care navigation team within 24 hours.  To arrange close follow-up or to obtain additional clinical information, please call the contact number above.     The on call [facility] Hospitalist has been notified and will coordinate care in concert with the ED Physician including consults as necessary. Call [phone] (North), [phone] (South) to reach the hospitalist. You may also call the Hospitalist Division at  at either site.     Consider CDU for management per guideline

## 2023-07-21 NOTE — ED ADULT NURSE NOTE - AS PAIN REST
Ongoing SW/CM Assessment/Plan of Care Note     See SW/CM flowsheets for goals and other objective data.    Progress note:  MSW facilitated pt's family meeting with pt and pt's father, Marcelino.    Pt shared that he is feeling better than when he arrived, feels his depression and anxiety are more stable and denies SI/HI/AV hallucinations. Pt has been compliant with medications and attending selective groups. Pt showed MSW some work sheets from group that he has filled out that talked about mental health maintenance.   Pt's father reports that he feels the pt is doing better and looks better after visitation yesterday. Pt's father is happy that the pt has been compliant with medications and reports being happy that the pt is no longer taking Klonopin. Father discussed the importance of more communication and in person visits to make sure the pt is doing well. Pt was agreeable to this. Pt's father has no safety concerns.     MSW educated pt and pt's father to call 911 or go to the ER in the event of a mental health crisis.  MSW went over discharge plan- pt's father will pick pt up. Pt has follow up appointment with Dr. Tolbert on 6/28/23 at 5 pm.      Discharge plan:  Home, follow up appointment with Dr. Tolbert 6/28/23 at 5 pm.    CM/SW team to continue to follow for discharge needs.        
 Ongoing SW/CM Assessment/Plan of Care Note     See SW/CM flowsheets for goals and other objective data.    Progress note:  MSW met with pt with psychiatrist and RN. Pt appearing flat, anxious and depressed. Pt shared that he feels he is doing \"better\". Pt shared that he had written down several ideas on how to better understand himself. Pt talked about how he engages in psychological self destruction. When asked if he physically harms himself he smiled and said no. Pt denies SI/HI at this time. Pt has been compliant with medications and is participating in groups.    MSW obtained pt's follow up appointment with Dr. Tolbert. Information in AVS.     MSW spoke with pt's father, Marcelino. MSW provided update.     Discharge plan:  Return home, follow up appointment    CM/SW team to continue to follow for discharge needs.        
 Ongoing SW/CM Assessment/Plan of Care Note     See SW/CM flowsheets for goals and other objective data.    Progress note:  MSW met with pt with psychiatrist and staff. Pt remains very anxious, delusional and focused on discharging from the hospital. Pt went into depth about how he self hypnotizes himself while masturbating and helps people with their problems on a web site called [employer]. Pt repeatedly asked if he could discharge so that he could walk outside and come to the hospital each day to take his medications and drive home.    MSW received phone call from pt's father, Tanner [phone]. Tanner reports that at visitation the pt was in good spirits and smiling. Tanner was wondering whether this could have been due to the change in medications. MSW went over pt's medication with father. Father reports the pt called him to asking him to help him leave. Father was concerned that pt could sign himself out. MSW assured father that pt cannot leave and that discharge is directed by MD.     Discharge plan:  Return home, outpatient follow up appointments.    CM/SW team to continue to follow for discharge needs.        
 Ongoing SW/CM Assessment/Plan of Care Note     See SW/CM flowsheets for goals and other objective data.    Progress note:  MSW met with pt with psychiatrist. Pt continues to appear flat and anxious. Pt reports he feels he is doing \"better\" and the medications are working well.Per RN pt is focused on feeling that other people on the unit are talking about him. Pt has been compliant with medications and attending select groups. MD is making medication adjustments.  Family meeting is scheduled for Monday 6/26/23 at 10 am with pt's father, Marcelino.  Pt has follow up appointment with Dr. Tolbert in [facility].    Discharge plan:  Return home, outpatient follow up appointment.    CM/SW team to continue to follow for discharge needs.        
 Ongoing SW/CM Assessment/Plan of Care Note     See SW/CM flowsheets for goals and other objective data.    Progress note:  MSW met with pt with psychiatrist. Pt remains flat, anxious and depressed. Pt shared that he requested Haldol from RN due to having active SI thoughts. Pt reports feeling very anxious and having ruminating thoughts. Pt shared that he wants to focus on getting out of his room and going to groups so that he doesn't focus on being here on the unit and not being able to leave.    MSW will assist in obtaining outpatient follow up appointment.    Discharge plan:  TBD    CM/SW team to continue to follow for discharge needs.        
 Ongoing SW/CM Assessment/Plan of Care Note     See SW/CM flowsheets for goals and other objective data.    Progress note:  MSW met with pt with psychiatrist. Pt remains flat, depressed and withdrawn. Pt was laying in bed with eyes closed when speaking with MSW and MD.     MSW contacted pt's father, Tanner [phone].  Tanner reports the pt is diagnosed with Asperger's at 9 years old and is anti-social. Tanner tries to keep in touch a couple times per week but the pt never calls him. Tanner reports the pt called him and said \"I am in trouble and I did hypnosis and the voices are telling me to die\". Pt came to father's house and stayed in the driveway and asked the father not to come out. Father reports the pt was taking Xanax and then he started to take more medication without discussing with his MD. Father is concerned with the pt's eating habits and feels the pt lost up to 100 lbs. The pt mostly stays in his apartment and does not have friends. The pt is estranged from his mother and sister and his father is only support system. Father is happy to hear pt is safe and getting help.     Discharge plan:  TBD    CM/SW team to continue to follow for discharge needs.        
 Ongoing SW/CM Assessment/Plan of Care Note     See SW/CM flowsheets for goals and other objective data.    Progress note:  MSW met with pt. Pt continues to be withdrawn, anxious and depressed. Pt shared that he went up to the nursing station today and told the RN he was feeling suicidal and wanted to smash his face against his mirror. Pt then reported that he was not suicidal and was seeking attention because he wants to be discharged. MSW educated pt that he needs to focus on attending groups and finding better coping skills.    MSW scheduled pt's follow up appointment with Dr. Tolbert. Information in AVS.    Discharge plan:  Return home, outpatient follow up appointments.    CM/SW team to continue to follow for discharge needs.        
 Ongoing SW/CM Assessment/Plan of Care Note     See SW/CM flowsheets for goals and other objective data.    Progress note:  MSW received phone call from pt's father, Marcelino [phone].  Pt's father, Marcelino reports that he is very concerned for the pt as the pt has been calling him making strange statements similar to when he was hospitalized. Father is concerned that the pt's outpatient psychiatrist put him back on Xanax.     MSW provided support and encouraged father to reach out to pt's outpatient provider to discuss concerns. MSW encouraged encouraged father that to call 911 if he has any safety concerns for the pt.       CM/SW team to continue to follow for discharge needs.        
SW/CM Discharge Plan  Anticipate that patient will be medically cleared for discharge today. Patient’s discharge destination is Home. Patient to be picked up by  Father, Marcelino. Patient/family aware and agreeable to discharge plan.  Discharge plan communicated to RN.     MSW met with pt prior to discharge. Pt states he is doing well. Pt reports his depression and anxiety have improved. Pt denies SI/HI/AV hallucinations. Pt had family meeting with father who had no safety concerns. Pt's father, Marcelino will pick pt up at 11 am.    Addendum 11: 00am  MSW met with pt with RN. Pt asked RN to dispose of remaining Xanax that was stored for him on the unit.  
3 (mild pain)

## 2023-07-22 PROBLEM — Z86.39 PERSONAL HISTORY OF OTHER ENDOCRINE, NUTRITIONAL AND METABOLIC DISEASE: Chronic | Status: ACTIVE | Noted: 2023-07-17

## 2023-07-22 PROBLEM — E78.00 PURE HYPERCHOLESTEROLEMIA, UNSPECIFIED: Chronic | Status: ACTIVE | Noted: 2023-07-17

## 2023-07-22 LAB
ALBUMIN SERPL ELPH-MCNC: 4.5 G/DL — SIGNIFICANT CHANGE UP (ref 3.5–5.2)
ALP SERPL-CCNC: 96 U/L — SIGNIFICANT CHANGE UP (ref 30–115)
ALT FLD-CCNC: 14 U/L — SIGNIFICANT CHANGE UP (ref 0–41)
ANION GAP SERPL CALC-SCNC: 13 MMOL/L — SIGNIFICANT CHANGE UP (ref 7–14)
APPEARANCE UR: CLEAR — SIGNIFICANT CHANGE UP
AST SERPL-CCNC: 21 U/L — SIGNIFICANT CHANGE UP (ref 0–41)
BACTERIA # UR AUTO: NEGATIVE — SIGNIFICANT CHANGE UP
BASOPHILS # BLD AUTO: 0.04 K/UL — SIGNIFICANT CHANGE UP (ref 0–0.2)
BASOPHILS NFR BLD AUTO: 1.1 % — HIGH (ref 0–1)
BILIRUB SERPL-MCNC: 0.3 MG/DL — SIGNIFICANT CHANGE UP (ref 0.2–1.2)
BILIRUB UR-MCNC: NEGATIVE — SIGNIFICANT CHANGE UP
BUN SERPL-MCNC: 20 MG/DL — SIGNIFICANT CHANGE UP (ref 10–20)
CALCIUM SERPL-MCNC: 9.6 MG/DL — SIGNIFICANT CHANGE UP (ref 8.4–10.5)
CHLORIDE SERPL-SCNC: 101 MMOL/L — SIGNIFICANT CHANGE UP (ref 98–110)
CO2 SERPL-SCNC: 23 MMOL/L — SIGNIFICANT CHANGE UP (ref 17–32)
COLOR SPEC: SIGNIFICANT CHANGE UP
CREAT SERPL-MCNC: 1.2 MG/DL — SIGNIFICANT CHANGE UP (ref 0.7–1.5)
DIFF PNL FLD: ABNORMAL
EGFR: 49 ML/MIN/1.73M2 — LOW
EOSINOPHIL # BLD AUTO: 0.07 K/UL — SIGNIFICANT CHANGE UP (ref 0–0.7)
EOSINOPHIL NFR BLD AUTO: 1.9 % — SIGNIFICANT CHANGE UP (ref 0–8)
EPI CELLS # UR: 4 /HPF — SIGNIFICANT CHANGE UP (ref 0–5)
GLUCOSE SERPL-MCNC: 121 MG/DL — HIGH (ref 70–99)
GLUCOSE UR QL: NEGATIVE — SIGNIFICANT CHANGE UP
HCT VFR BLD CALC: 36.9 % — LOW (ref 37–47)
HGB BLD-MCNC: 12.1 G/DL — SIGNIFICANT CHANGE UP (ref 12–16)
HYALINE CASTS # UR AUTO: 2 /LPF — SIGNIFICANT CHANGE UP (ref 0–7)
IMM GRANULOCYTES NFR BLD AUTO: 0.3 % — SIGNIFICANT CHANGE UP (ref 0.1–0.3)
KETONES UR-MCNC: NEGATIVE — SIGNIFICANT CHANGE UP
LEUKOCYTE ESTERASE UR-ACNC: ABNORMAL
LIDOCAIN IGE QN: 59 U/L — SIGNIFICANT CHANGE UP (ref 7–60)
LYMPHOCYTES # BLD AUTO: 1.42 K/UL — SIGNIFICANT CHANGE UP (ref 1.2–3.4)
LYMPHOCYTES # BLD AUTO: 38.4 % — SIGNIFICANT CHANGE UP (ref 20.5–51.1)
MCHC RBC-ENTMCNC: 27.4 PG — SIGNIFICANT CHANGE UP (ref 27–31)
MCHC RBC-ENTMCNC: 32.8 G/DL — SIGNIFICANT CHANGE UP (ref 32–37)
MCV RBC AUTO: 83.7 FL — SIGNIFICANT CHANGE UP (ref 81–99)
MONOCYTES # BLD AUTO: 0.23 K/UL — SIGNIFICANT CHANGE UP (ref 0.1–0.6)
MONOCYTES NFR BLD AUTO: 6.2 % — SIGNIFICANT CHANGE UP (ref 1.7–9.3)
NEUTROPHILS # BLD AUTO: 1.93 K/UL — SIGNIFICANT CHANGE UP (ref 1.4–6.5)
NEUTROPHILS NFR BLD AUTO: 52.1 % — SIGNIFICANT CHANGE UP (ref 42.2–75.2)
NITRITE UR-MCNC: NEGATIVE — SIGNIFICANT CHANGE UP
NRBC # BLD: 0 /100 WBCS — SIGNIFICANT CHANGE UP (ref 0–0)
PH UR: 6 — SIGNIFICANT CHANGE UP (ref 5–8)
PLATELET # BLD AUTO: 287 K/UL — SIGNIFICANT CHANGE UP (ref 130–400)
PMV BLD: 9.1 FL — SIGNIFICANT CHANGE UP (ref 7.4–10.4)
POTASSIUM SERPL-MCNC: 4.1 MMOL/L — SIGNIFICANT CHANGE UP (ref 3.5–5)
POTASSIUM SERPL-SCNC: 4.1 MMOL/L — SIGNIFICANT CHANGE UP (ref 3.5–5)
PROT SERPL-MCNC: 7.1 G/DL — SIGNIFICANT CHANGE UP (ref 6–8)
PROT UR-MCNC: SIGNIFICANT CHANGE UP
RBC # BLD: 4.41 M/UL — SIGNIFICANT CHANGE UP (ref 4.2–5.4)
RBC # FLD: 13.7 % — SIGNIFICANT CHANGE UP (ref 11.5–14.5)
RBC CASTS # UR COMP ASSIST: 1 /HPF — SIGNIFICANT CHANGE UP (ref 0–4)
SODIUM SERPL-SCNC: 137 MMOL/L — SIGNIFICANT CHANGE UP (ref 135–146)
SP GR SPEC: 1.02 — SIGNIFICANT CHANGE UP (ref 1.01–1.03)
TROPONIN T SERPL-MCNC: <0.01 NG/ML — SIGNIFICANT CHANGE UP
UROBILINOGEN FLD QL: SIGNIFICANT CHANGE UP
WBC # BLD: 3.7 K/UL — LOW (ref 4.8–10.8)
WBC # FLD AUTO: 3.7 K/UL — LOW (ref 4.8–10.8)
WBC UR QL: 17 /HPF — HIGH (ref 0–5)

## 2023-07-22 PROCEDURE — 76705 ECHO EXAM OF ABDOMEN: CPT | Mod: 26

## 2023-07-22 PROCEDURE — 74177 CT ABD & PELVIS W/CONTRAST: CPT | Mod: 26,MA

## 2023-07-22 NOTE — ED PROVIDER NOTE - PROGRESS NOTE DETAILS
BF: CT showing jejunal enteritis. US not concerning for acute cholecystitis on my interpretation. Will reassess BF: CT showing jejunal enteritis. US not concerning for acute cholecystitis on my interpretation. No urinary sxs, so will wait for culture to determine need for abx. Will reassess pain and PO challenge

## 2023-07-22 NOTE — ED PROVIDER NOTE - PHYSICAL EXAMINATION
VITAL SIGNS: I have reviewed nursing notes and confirm.  CONSTITUTIONAL: well-appearing, non-toxic, NAD  SKIN: Warm dry, normal skin turgor  HEAD: NCAT  EYES: EOMI, PERRLA, no scleral icterus  ENT: Moist mucous membranes, normal pharynx with no erythema or exudates  NECK: Supple; non tender. Full ROM.   CARD: RRR, no murmurs, rubs or gallops  RESP: clear to ausculation b/l.  No rales, rhonchi, or wheezing.  ABD: soft, + BS, (+) RUQ and epigastric tenderness, non-distended, no rebound or guarding. (+) right CVA tenderness  EXT: Full ROM, no bony tenderness, no pedal edema, no calf tenderness  PSYCH: Cooperative, appropriate.

## 2023-07-22 NOTE — ED PROVIDER NOTE - OBJECTIVE STATEMENT
Patient is a 67-year-old female with past medical history of diabetes, hypertension, 2 cardiac stents (placed 5 to 6 years ago) presenting for evaluation of right upper quadrant and epigastric pain that radiates into the right shoulder.  Patient is scheduled to have cholecystectomy on 7/28/2023 by Dr. Tompkins.  Today she said at 10 AM she began having worsening pain associated with the right flank pain and nausea.  She denies dysuria, hematuria, diarrhea, constipation, chest pain, shortness of breath.  Patient took dicyclomine 20 mg at 1 PM with mild improvement of symptoms. Patient is a 67-year-old female with past medical history of diabetes, hypertension, 2 cardiac stents (placed 5 to 6 years ago) presenting for evaluation of right upper quadrant and epigastric pain that radiates into the right shoulder.  Patient is scheduled to have cholecystectomy on 7/28/2023 by Dr. Chow.  Today she said at 10 AM she began having worsening pain associated with the right flank pain and nausea.  She denies dysuria, hematuria, diarrhea, constipation, chest pain, shortness of breath.  Patient took dicyclomine 20 mg at 1 PM with mild improvement of symptoms.

## 2023-07-22 NOTE — ED PROVIDER NOTE - PATIENT PORTAL LINK FT
You can access the FollowMyHealth Patient Portal offered by [facility] by registering at the following website: [email or web address]. By joining [employer]’s FollowMyHealth portal, you will also be able to view your health information using other applications (apps) compatible with our system.

## 2023-07-22 NOTE — ED PROVIDER NOTE - CLINICAL SUMMARY MEDICAL DECISION MAKING FREE TEXT BOX
67-year-old female with history of pancreatitis 67-year-old female with history of pancreatitis, CAD status post stents x2 on Plavix and aspirin, hypertension, hyperlipidemia, diabetes, status post?  ERCP on July 6 presents to the ED complaining of right upper quadrant abdominal pain x1 day.  Appreciate triage note stating complaint of chest pain, clarified with  that pain is actually to the abdomen.  Patient denies chest pain.  Patient does endorse some numbness to the right shoulder, there is no numbness to the arm.  The pain is constant, moderate in severity, achy in quality, radiates to the right shoulder, exacerbated with p.o. intake, alleviated when NPO.    On exam, vital signs within normal limits.  Patient is well-appearing, no acute distress.  Head is normocephalic and atraumatic.  Normal conjunctiva bilaterally.  Neck is supple, no meningismus.  Heart is regular rate and rhythm, lungs are clear to auscultation bilaterally.  Patient is tender to the epigastric and right upper quadrant region.  No CVA tenderness.  Rest of the abdomen is soft, no rebound, no rigidity, no guarding.  Full range of motion of extremities, normal sensation, normal pulses.    Patient is scheduled for cholecystectomy next Friday.  Her pain is likely from her known pancreatitis and biliary pathology.  Doubt pneumonia given no fever, no cough.  Doubt pyelo or nephrolithiasis given no urinary symptoms, no CVA tenderness.  ACS considered, though less likely.  Will obtain labs to further evaluate.  Doubt dissection.    Plan for right upper quadrant ultrasound, CT, labs, pain control.

## 2023-07-27 NOTE — ASSESSMENT
[FreeTextEntry1] : DARIELA WALKER  is a pleasant 67 year year old woman  who came in 07/11/2023 for GS pancreatitis  . She has Dr KIMBERLY APPLE as Her PCP.\par \par 67-year-old female with past medical history of pancreatitis, CAD (x2 stent, on \par Plavix and ASA 81 mg), hypercholesterolemia, HTN, DM presented to the ED  july 3rd 2023, with \par complaints of bilateral flank pain which began at 6pm and progressed until she \par decided to come to the ED at 9pm.  Reports symptoms are similar to what she \par felt at end of April when she was found to have pancreatitis and urine culture \par positive for ESBL.  \par \par Significant hospital course: \par  Patient was found to have elevated lipase of 240 and CT \par imaging showed new CBD dilation up to 8 mm (previously 4 mm) and pancreatic \par duct dilation up to 6 mm with new hepatic segment IVb ill-defined hypodensity. \par Stable uncinate process 1.3 cm hypodense cystic lesion.\par \par July 6, 2023: EGD/EUS\par \par EGD: Hill grade 2 hiatal hernia, nonerosive gastritis (biopsy), normal \par duodenum (biopsy). \par  \par EUS: In the pancreatic head, there was an anechoic, homogeneous, and round, \par cyst communicating with the PD consistent with SB-IPMN measuring 15 mm x 12 mm \par and a normal PD measuring 3-4 mm. Within the cyst, there was a freely floating / \par mobile hypoechoic, non-enhancing, round intra-cystic structure with a smooth \par hyperechoic rim and smooth edges, suggestive of a mucin ball. No mural nodules, \par septation, calcification or other high-risk features were noted. \par  \par EUS: Areas of heterogeneous hypoechoic and isoechoic foci consistent with \par history of recent acute pancreatitis. Otherwise normal exam of the BOP and TOP \par with a PD measuring 1-2 mm. There was a dilated side-branch to 3 mm in the TOP. \par  \par  \par EUS. The gallbladder was identified with evidence of biliary sludge and a \par small stone / sludge in the cystic duct. \par  \par 7/11/2023: will proceed with lap ang, need to follow her IPMN with MRI/EUS annual, she aware of possible need for postcholecystectomy ERCP\par LIVER SEGMENT 4B 2CM LESION, NEED MRI AND MARKERS after robotic cholecystectomy\par \par the above plan of care with discussed in details to the patient and all questions were answered to patient satisfaction. patient instructed to follow up with the referring physician and patient primary care provider\par \par A total of 80 minutes was spent on this visit, obtaining h/p,  reviewing previous notes/imaging, counseling the patient on coming procedure and follow up , ordering tests (above), and documenting the findings in the note.\par \par \par

## 2023-07-27 NOTE — HISTORY OF PRESENT ILLNESS
[de-identified] : DARIELA WALKER  is a pleasant 67 year year old woman  who came in 07/11/2023 for GS pancreatitis  . She has Dr KIMBERLY APPLE as Her PCP.\par \par 67-year-old female with past medical history of pancreatitis, CAD (x2 stent, on \par Plavix and ASA 81 mg), hypercholesterolemia, HTN, DM presented to the ED  july 3rd 2023, with \par complaints of bilateral flank pain which began at 6pm and progressed until she \par decided to come to the ED at 9pm.  Reports symptoms are similar to what she \par felt at end of April when she was found to have pancreatitis and urine culture \par positive for ESBL.  \par \par Significant hospital course: \par  Patient was found to have elevated lipase of 240 and CT \par imaging showed new CBD dilation up to 8 mm (previously 4 mm) and pancreatic \par duct dilation up to 6 mm with new hepatic segment IVb ill-defined hypodensity. \par Stable uncinate process 1.3 cm hypodense cystic lesion.\par \par July 6, 2023: EGD/EUS\par \par EGD: Hill grade 2 hiatal hernia, nonerosive gastritis (biopsy), normal \par duodenum (biopsy). \par  \par EUS: In the pancreatic head, there was an anechoic, homogeneous, and round, \par cyst communicating with the PD consistent with SB-IPMN measuring 15 mm x 12 mm \par and a normal PD measuring 3-4 mm. Within the cyst, there was a freely floating / \par mobile hypoechoic, non-enhancing, round intra-cystic structure with a smooth \par hyperechoic rim and smooth edges, suggestive of a mucin ball. No mural nodules, \par septation, calcification or other high-risk features were noted. \par  \par EUS: Areas of heterogeneous hypoechoic and isoechoic foci consistent with \par history of recent acute pancreatitis. Otherwise normal exam of the BOP and TOP \par with a PD measuring 1-2 mm. There was a dilated side-branch to 3 mm in the TOP. \par  \par  \par EUS. The gallbladder was identified with evidence of biliary sludge and a \par small stone / sludge in the cystic duct. \par  \par \par \par

## 2023-07-28 ENCOUNTER — TRANSCRIPTION ENCOUNTER (OUTPATIENT)
Age: 68
End: 2023-07-28

## 2023-07-28 ENCOUNTER — OUTPATIENT (OUTPATIENT)
Dept: OUTPATIENT SERVICES | Facility: HOSPITAL | Age: 68
LOS: 1 days | Discharge: HOME | End: 2023-07-28
Payer: MEDICARE

## 2023-07-28 ENCOUNTER — APPOINTMENT (OUTPATIENT)
Dept: SURGERY | Facility: AMBULATORY SURGERY CENTER | Age: 68
End: 2023-07-28

## 2023-07-28 ENCOUNTER — RESULT REVIEW (OUTPATIENT)
Age: 68
End: 2023-07-28

## 2023-07-28 VITALS
SYSTOLIC BLOOD PRESSURE: 136 MMHG | HEART RATE: 81 BPM | DIASTOLIC BLOOD PRESSURE: 82 MMHG | RESPIRATION RATE: 19 BRPM | HEIGHT: 66 IN | TEMPERATURE: 98 F | WEIGHT: 192.02 LBS | OXYGEN SATURATION: 99 %

## 2023-07-28 VITALS
OXYGEN SATURATION: 95 % | DIASTOLIC BLOOD PRESSURE: 66 MMHG | SYSTOLIC BLOOD PRESSURE: 148 MMHG | RESPIRATION RATE: 22 BRPM | HEART RATE: 79 BPM

## 2023-07-28 DIAGNOSIS — Z98.890 OTHER SPECIFIED POSTPROCEDURAL STATES: Chronic | ICD-10-CM

## 2023-07-28 DIAGNOSIS — Z98.61 CORONARY ANGIOPLASTY STATUS: Chronic | ICD-10-CM

## 2023-07-28 DIAGNOSIS — K85.10 BILIARY ACUTE PANCREATITIS WITHOUT NECROSIS OR INFECTION: ICD-10-CM

## 2023-07-28 PROCEDURE — C1889: CPT

## 2023-07-28 PROCEDURE — S2900: CPT

## 2023-07-28 PROCEDURE — 47600 CHOLECYSTECTOMY: CPT

## 2023-07-28 PROCEDURE — 88304 TISSUE EXAM BY PATHOLOGIST: CPT

## 2023-07-28 RX ORDER — METOPROLOL TARTRATE 50 MG
1 TABLET ORAL
Qty: 0 | Refills: 0 | DISCHARGE

## 2023-07-28 RX ORDER — KETOROLAC TROMETHAMINE 30 MG/ML
15 SYRINGE (ML) INJECTION ONCE
Refills: 0 | Status: DISCONTINUED | OUTPATIENT
Start: 2023-07-28 | End: 2023-07-28

## 2023-07-28 RX ORDER — ATORVASTATIN CALCIUM 80 MG/1
1 TABLET, FILM COATED ORAL
Refills: 0 | DISCHARGE

## 2023-07-28 RX ORDER — CLOPIDOGREL BISULFATE 75 MG/1
1 TABLET, FILM COATED ORAL
Refills: 0 | DISCHARGE

## 2023-07-28 RX ORDER — HYDROMORPHONE HYDROCHLORIDE 2 MG/ML
0.5 INJECTION INTRAMUSCULAR; INTRAVENOUS; SUBCUTANEOUS
Refills: 0 | Status: DISCONTINUED | OUTPATIENT
Start: 2023-07-28 | End: 2023-07-28

## 2023-07-28 RX ORDER — OXYCODONE HYDROCHLORIDE 5 MG/1
5 TABLET ORAL ONCE
Refills: 0 | Status: DISCONTINUED | OUTPATIENT
Start: 2023-07-28 | End: 2023-07-28

## 2023-07-28 RX ORDER — LISINOPRIL 2.5 MG/1
1 TABLET ORAL
Qty: 0 | Refills: 0 | DISCHARGE

## 2023-07-28 RX ORDER — SODIUM CHLORIDE 9 MG/ML
1000 INJECTION, SOLUTION INTRAVENOUS
Refills: 0 | Status: DISCONTINUED | OUTPATIENT
Start: 2023-07-28 | End: 2023-07-28

## 2023-07-28 RX ORDER — EZETIMIBE 10 MG/1
1 TABLET ORAL
Refills: 0 | DISCHARGE

## 2023-07-28 RX ORDER — ACETAMINOPHEN 500 MG
1000 TABLET ORAL ONCE
Refills: 0 | Status: COMPLETED | OUTPATIENT
Start: 2023-07-28 | End: 2023-07-28

## 2023-07-28 RX ORDER — OXYCODONE AND ACETAMINOPHEN 5; 325 MG/1; MG/1
1 TABLET ORAL
Qty: 8 | Refills: 0
Start: 2023-07-28 | End: 2023-07-29

## 2023-07-28 RX ORDER — ONDANSETRON 8 MG/1
4 TABLET, FILM COATED ORAL ONCE
Refills: 0 | Status: DISCONTINUED | OUTPATIENT
Start: 2023-07-28 | End: 2023-07-28

## 2023-07-28 RX ADMIN — HYDROMORPHONE HYDROCHLORIDE 0.5 MILLIGRAM(S): 2 INJECTION INTRAMUSCULAR; INTRAVENOUS; SUBCUTANEOUS at 10:07

## 2023-07-28 RX ADMIN — Medication 400 MILLIGRAM(S): at 11:55

## 2023-07-28 RX ADMIN — Medication 15 MILLIGRAM(S): at 11:56

## 2023-07-28 RX ADMIN — HYDROMORPHONE HYDROCHLORIDE 0.5 MILLIGRAM(S): 2 INJECTION INTRAMUSCULAR; INTRAVENOUS; SUBCUTANEOUS at 10:46

## 2023-07-28 RX ADMIN — HYDROMORPHONE HYDROCHLORIDE 0.5 MILLIGRAM(S): 2 INJECTION INTRAMUSCULAR; INTRAVENOUS; SUBCUTANEOUS at 09:56

## 2023-07-28 RX ADMIN — SODIUM CHLORIDE 100 MILLILITER(S): 9 INJECTION, SOLUTION INTRAVENOUS at 09:57

## 2023-07-28 RX ADMIN — HYDROMORPHONE HYDROCHLORIDE 0.5 MILLIGRAM(S): 2 INJECTION INTRAMUSCULAR; INTRAVENOUS; SUBCUTANEOUS at 10:40

## 2023-07-28 RX ADMIN — HYDROMORPHONE HYDROCHLORIDE 0.5 MILLIGRAM(S): 2 INJECTION INTRAMUSCULAR; INTRAVENOUS; SUBCUTANEOUS at 10:10

## 2023-07-28 NOTE — ASU DISCHARGE PLAN (ADULT/PEDIATRIC) - CARE PROVIDER_API CALL
Abram Chow  Complex General Surgical Oncology  [address] [place] [address] [address] [place] [address]  Phone: [phone]  Fax: [phone]  Follow Up Time: 2 weeks

## 2023-07-28 NOTE — PRE-ANESTHESIA EVALUATION ADULT - NSATTENDATTESTRD_GEN_ALL_CORE
The patient has been re-examined and I agree with the above assessment or I updated with my findings.
None

## 2023-07-28 NOTE — ASU DISCHARGE PLAN (ADULT/PEDIATRIC) - NS MD DC FALL RISK RISK
For information on Fall & Injury Prevention, visit: [email or web address] OR  [email or web address] OR  https://www.cdc.gov/steadi/patient.html

## 2023-07-28 NOTE — PRE-ANESTHESIA EVALUATION ADULT - WEIGHT IN LBS
LIQUID NITROGEN INSTRUCTIONS    Freezing with liquid nitrogen is accompanied by a stinging, burning sensation which usually lasts from 15 minutes up to several hours.     It is normal for a blister to form at the treatment site.  Occasionally this will be a blood blister.  It is usually best to leave the blister unopened.  The blister normally breaks in the first few days, but on the fingers it can last longer.  If it is painful, it can be opened after cleansing the area with soap and water followed by rubbing alcohol.  A needle that has been sterilized with a match and then wiped clean with rubbing alcohol can be used to open the blister.  Wash hands before opening the blister.    The blister may form into a dry crust.  The crust should peel off in 2-4 weeks.  There may be some minimal redness after the crust falls off, but this should fade with time.     It is okay to wash, shampoo, shower or apply cosmetics to the area, but the area should not be rubbed as this can irritate it.  Pat dry with a soft towel.    Apply Vaseline to the treated area four times a day and it will heal faster.  Wash hands before applying Vaseline.    Possible side effects: risk of permanent pigment change (lighter or darker skin), persistence, blister, crusting, discomfort, infection.      Please call if any questions:    Dr. Kasper  [facility]  [phone]   [phone]

## 2023-07-28 NOTE — BRIEF OPERATIVE NOTE - NSICDXBRIEFPOSTOP_GEN_ALL_CORE_FT
POST-OP DIAGNOSIS:  Gallstone pancreatitis 28-Jul-2023 09:50:04  Luisa Mills  Cholelithiasis 28-Jul-2023 09:50:25  Luisa Mills

## 2023-07-28 NOTE — ASU DISCHARGE PLAN (ADULT/PEDIATRIC) - FOLLOW UP APPOINTMENTS
[facility],  [facility]:  [facility]

## 2023-07-28 NOTE — CHART NOTE - NSCHARTNOTEFT_GEN_A_CORE
PACU ANESTHESIA ADMISSION NOTE      Procedure: Robot-assisted laparoscopic cholecystectomy      Post op diagnosis:  Gallstone pancreatitis    Cholelithiasis        __x__  Patent Airway    _x___  Full return of protective reflexes    ____  Full recovery from anesthesia / back to baseline     Vitals:   see anesthesia record    Mental Status:  __x__ Awake   _____ Alert   _____ Drowsy   _____ Sedated    Nausea/Vomiting:  __x__ NO  ______Yes,   See Post - Op Orders          Pain Scale (0-10):  _____    Treatment: ____ None    ___x_ See Post - Op/PCA Orders    Post - Operative Fluids:   ____ Oral   __x__ See Post - Op Orders    Plan: Discharge:   __x__Home       _____Floor     _____Critical Care    _____  Other:_________________    Comments:  Uneventful intraoperative course. No anesthesia issues or complications noted. Patient stable upon arrival to PACU. Report given to RN. Discharge when criteria met.

## 2023-07-28 NOTE — ASU DISCHARGE PLAN (ADULT/PEDIATRIC) - ASU DC SPECIAL INSTRUCTIONSFT
Diet : You may resume your regular diet. It is recommended to eat a low fat diet and increase fluid intake to avoid constipation.  Diarrhea post op is not unexpected. If you are constipated, take stool softeners if needed to avoid straining with stool.    Pain: Take Tylenol 650mg or Motrin 600mg every 6-8 hours as needed for mild to moderate pain. Do not exceed more than 4000mg of Tylenol (acetaminophen) per day. Do not take Motrin if you are allergic to NSAIDs. Take with food to decrease GI upset. Take Percocet every 6-8 hours as needed for breakthrough, severe pain. If you need to take Percocet please make sure to take a stool softener and drink plenty of water as opioids can be constipating.     Activity : No heavy lifting for 6 weeks (no lifting more than ~10-15 pounds). No tub baths, no pool or ocean for at least 2 weeks. Do not submerge the incisions in water. You may shower in 1 days and let water run over the incision, but do not submerge.    Dressing : Keep dressings dry for today, you may shower tomorrow. Do not pick the edges of the skin glue when they loosen. This will come off on its own in approximately 2 weeks.    Follow up : Follow up with Dr. Chow in the office in approximately 2 weeks. Call the office if you have any questions or concerns.    SEEK IMMEDIATE MEDICAL CARE IF YOU HAVE ANY OF THE FOLLOWING SYMPTOMS: fever >101F, persistent vomiting, yellowing of the skin or eyes, or altered mental status.    You were given a dye during surgery called ICG, to assist with anatomy delineation, do not be alarmed if your urine is green for the next day or two.

## 2023-07-28 NOTE — ASU DISCHARGE PLAN (ADULT/PEDIATRIC) - CALL YOUR DOCTOR IF YOU HAVE ANY OF THE FOLLOWING:
Bleeding that does not stop/Pain not relieved by Medications/Wound/Surgical Site with redness, or foul smelling discharge or pus/Numbness, tingling, color or temperature change to extremity/Nausea and vomiting that does not stop/Inability to tolerate liquids or foods

## 2023-07-28 NOTE — PRE-ANESTHESIA EVALUATION ADULT - NSANTHADDINFOFT_GEN_ALL_CORE
GA planned; Risks discussed including dental injury and more serious complications including cardiac and pulmonary complications and stroke.  Patient expresses understanding with regard to risks of anesthesia and wishes to proceed.
Clear bilaterally, pupils equal, round and reactive to light.

## 2023-07-31 LAB — SURGICAL PATHOLOGY STUDY: SIGNIFICANT CHANGE UP

## 2023-08-01 DIAGNOSIS — Z79.02 LONG TERM (CURRENT) USE OF ANTITHROMBOTICS/ANTIPLATELETS: ICD-10-CM

## 2023-08-01 DIAGNOSIS — K81.1 CHRONIC CHOLECYSTITIS: ICD-10-CM

## 2023-08-01 DIAGNOSIS — I25.2 OLD MYOCARDIAL INFARCTION: ICD-10-CM

## 2023-08-01 DIAGNOSIS — E11.9 TYPE 2 DIABETES MELLITUS WITHOUT COMPLICATIONS: ICD-10-CM

## 2023-08-01 DIAGNOSIS — E66.9 OBESITY, UNSPECIFIED: ICD-10-CM

## 2023-08-01 DIAGNOSIS — E78.00 PURE HYPERCHOLESTEROLEMIA, UNSPECIFIED: ICD-10-CM

## 2023-08-01 DIAGNOSIS — I10 ESSENTIAL (PRIMARY) HYPERTENSION: ICD-10-CM

## 2023-08-01 DIAGNOSIS — I25.10 ATHEROSCLEROTIC HEART DISEASE OF NATIVE CORONARY ARTERY WITHOUT ANGINA PECTORIS: ICD-10-CM

## 2023-08-01 DIAGNOSIS — K85.10 BILIARY ACUTE PANCREATITIS WITHOUT NECROSIS OR INFECTION: ICD-10-CM

## 2023-08-01 DIAGNOSIS — Z79.82 LONG TERM (CURRENT) USE OF ASPIRIN: ICD-10-CM

## 2023-08-01 DIAGNOSIS — Z79.84 LONG TERM (CURRENT) USE OF ORAL HYPOGLYCEMIC DRUGS: ICD-10-CM

## 2023-08-01 DIAGNOSIS — Z95.5 PRESENCE OF CORONARY ANGIOPLASTY IMPLANT AND GRAFT: ICD-10-CM

## 2023-08-03 ENCOUNTER — APPOINTMENT (OUTPATIENT)
Dept: GASTROENTEROLOGY | Facility: CLINIC | Age: 68
End: 2023-08-03
Payer: MEDICARE

## 2023-08-03 DIAGNOSIS — Z87.19 PERSONAL HISTORY OF OTHER DISEASES OF THE DIGESTIVE SYSTEM: ICD-10-CM

## 2023-08-03 PROCEDURE — 99443: CPT

## 2023-08-10 ENCOUNTER — APPOINTMENT (OUTPATIENT)
Dept: SURGERY | Facility: CLINIC | Age: 68
End: 2023-08-10
Payer: MEDICARE

## 2023-08-10 VITALS
WEIGHT: 190 LBS | BODY MASS INDEX: 31.65 KG/M2 | HEART RATE: 92 BPM | SYSTOLIC BLOOD PRESSURE: 104 MMHG | OXYGEN SATURATION: 95 % | TEMPERATURE: 96.4 F | HEIGHT: 65 IN | DIASTOLIC BLOOD PRESSURE: 62 MMHG

## 2023-08-10 PROCEDURE — 99024 POSTOP FOLLOW-UP VISIT: CPT

## 2023-08-10 NOTE — ASSESSMENT
[FreeTextEntry1] : DARIELA WALKER  is a pleasant 67 year year old woman  who came in 07/11/2023 for GS pancreatitis  . She has Dr KIMBERLY APPLE as Her PCP.  67-year-old female with past medical history of pancreatitis, CAD (x2 stent, on  Plavix and ASA 81 mg), hypercholesterolemia, HTN, DM presented to the ED  july 3rd 2023, with  complaints of bilateral flank pain which began at 6pm and progressed until she  decided to come to the ED at 9pm.  Reports symptoms are similar to what she  felt at end of April when she was found to have pancreatitis and urine culture  positive for ESBL.    Significant hospital course:   Patient was found to have elevated lipase of 240 and CT  imaging showed new CBD dilation up to 8 mm (previously 4 mm) and pancreatic  duct dilation up to 6 mm with new hepatic segment IVb ill-defined hypodensity.  Stable uncinate process 1.3 cm hypodense cystic lesion.  July 6, 2023: EGD/EUS  EGD: Hill grade 2 hiatal hernia, nonerosive gastritis (biopsy), normal  duodenum (biopsy).    EUS: In the pancreatic head, there was an anechoic, homogeneous, and round,  cyst communicating with the PD consistent with SB-IPMN measuring 15 mm x 12 mm  and a normal PD measuring 3-4 mm. Within the cyst, there was a freely floating /  mobile hypoechoic, non-enhancing, round intra-cystic structure with a smooth  hyperechoic rim and smooth edges, suggestive of a mucin ball. No mural nodules,  septation, calcification or other high-risk features were noted.    EUS: Areas of heterogeneous hypoechoic and isoechoic foci consistent with  history of recent acute pancreatitis. Otherwise normal exam of the BOP and TOP  with a PD measuring 1-2 mm. There was a dilated side-branch to 3 mm in the TOP.      EUS. The gallbladder was identified with evidence of biliary sludge and a  small stone / sludge in the cystic duct.    7/11/2023: will proceed with lap ang, need to follow her IPMN with MRI/EUS annual, she aware of possible need for postcholecystectomy ERCP LIVER SEGMENT 4B 2CM LESION, NEED MRI AND MARKERS after robotic cholecystectomy  the above plan of care with discussed in details to the patient and all questions were answered to patient satisfaction. patient instructed to follow up with the referring physician and patient primary care provider  A total of 80 minutes was spent on this visit, obtaining h/p,  reviewing previous notes/imaging, counseling the patient on coming procedure and follow up , ordering tests (above), and documenting the findings in the note.   8/10/23 post op visit. Accompanied by daughter. Abdomen soft non tender, non distended. Port sites healing well. Eating and drinking without difficulty. path reviewed with patient and daughter Final Diagnosis Gallbladder, laparoscopic cholecystectomy: - Chronic cholecystitis. PLan MRI and tumor markers for history of liver lesion and IPMN. Will see in office to discuss results once completed.

## 2023-08-10 NOTE — HISTORY OF PRESENT ILLNESS
[de-identified] : DARIELA WALKER  is a pleasant 67 year year old woman  who came in 07/11/2023 for GS pancreatitis  . She has Dr KIMBERLY APPLE as Her PCP.  67-year-old female with past medical history of pancreatitis, CAD (x2 stent, on  Plavix and ASA 81 mg), hypercholesterolemia, HTN, DM presented to the ED  july 3rd 2023, with  complaints of bilateral flank pain which began at 6pm and progressed until she  decided to come to the ED at 9pm.  Reports symptoms are similar to what she  felt at end of April when she was found to have pancreatitis and urine culture  positive for ESBL.    Significant hospital course:   Patient was found to have elevated lipase of 240 and CT  imaging showed new CBD dilation up to 8 mm (previously 4 mm) and pancreatic  duct dilation up to 6 mm with new hepatic segment IVb ill-defined hypodensity.  Stable uncinate process 1.3 cm hypodense cystic lesion.  July 6, 2023: EGD/EUS  EGD: Hill grade 2 hiatal hernia, nonerosive gastritis (biopsy), normal  duodenum (biopsy).    EUS: In the pancreatic head, there was an anechoic, homogeneous, and round,  cyst communicating with the PD consistent with SB-IPMN measuring 15 mm x 12 mm  and a normal PD measuring 3-4 mm. Within the cyst, there was a freely floating /  mobile hypoechoic, non-enhancing, round intra-cystic structure with a smooth  hyperechoic rim and smooth edges, suggestive of a mucin ball. No mural nodules,  septation, calcification or other high-risk features were noted.    EUS: Areas of heterogeneous hypoechoic and isoechoic foci consistent with  history of recent acute pancreatitis. Otherwise normal exam of the BOP and TOP  with a PD measuring 1-2 mm. There was a dilated side-branch to 3 mm in the TOP.      EUS. The gallbladder was identified with evidence of biliary sludge and a  small stone / sludge in the cystic duct.      8/10/23 post op visit

## 2023-08-10 NOTE — PHYSICAL EXAM
[Normal] : supple, no neck mass and thyroid not enlarged [Normal Neck Lymph Nodes] : normal neck lymph nodes  [Normal Supraclavicular Lymph Nodes] : normal supraclavicular lymph nodes [Normal Groin Lymph Nodes] : normal groin lymph nodes [Normal Axillary Lymph Nodes] : normal axillary lymph nodes [Normal] : oriented to person, place and time, with appropriate affect [de-identified] : soft non tender, non distended, port sites healing well

## 2023-08-10 NOTE — REASON FOR VISIT
[Post Op: _________] : a [unfilled] post op visit [Post-Op Visit] : a post-op visit for [Family Member] : family member [FreeTextEntry2] : cholecystitis

## 2023-08-14 ENCOUNTER — NON-APPOINTMENT (OUTPATIENT)
Age: 68
End: 2023-08-14

## 2023-08-17 PROBLEM — Z87.19 HISTORY OF PANCREATITIS: Status: ACTIVE | Noted: 2023-08-17

## 2023-08-17 NOTE — ASSESSMENT
[FreeTextEntry1] : 67-year-old female with past medical history of ?pancreatitis sec to unknown etiology, CAD (x2 stent, on Plavix last dose  and ASA 81 mg), hypercholesterolemia, HTN, DM, being followed after EUS.   7/6/2023: - EGD: Hill grade 2 hiatal hernia, nonerosive gastritis (biopsy unremarkable - no HP or IM), normal duodenum (biopsy unremarkable). - EUS: 15x12 mm SB-IPMN w/ mucin ball and a normal PD measuring 3-4 mm.  Areas of heterogeneous hypoechoic and isoechoic foci consistent with history of recent acute pancreatitis. dilated side-branch to 3 mm in the TOP. GB evidence of biliary sludge and a small stone / sludge in the cystic duct.  #hx of pancreatitis - likely 2/2 gallstones s/p CCY w/ no further recurrent abd pains doing well since her surgery  no further intervention f/u in 3 mo  #HCM will discuss next visit

## 2023-08-17 NOTE — HISTORY OF PRESENT ILLNESS
[Home] : at home, [unfilled] , at the time of the visit. [Medical Office: ([facility])___] : at the medical office located in  [Verbal consent obtained from patient] : the patient, [unfilled] [FreeTextEntry4] : JULIO SAPP [FreeTextEntry1] : 67-year-old female with past medical history of ?pancreatitis sec to unknown etiology, CAD (x2 stent, on Plavix last dose  and ASA 81 mg), hypercholesterolemia, HTN, DM, being followed after EUS.   pt was admitted w/ pancreatitis. no fam hx of panc ca or other GI cancers no hx of tobacco no recent meds but was on janumet and recently started semaglutide PO (after her prior presentation w/ ? pancreatitis) no OTC or herbal meds no significant unintentional weight loss she had dilated PD/CBD and pancreatic cyst w/ EUS showing no concerning findings except for cholelithiasis and she was referred for ccy.  she had a few episodes of epigastric and RUQ abd pain after discharge that were transient and did not require hospitalization.  she is s/p CCY w/ Dr. Chow on 7/28/23 and since then, she has been doing well w/ no recurrent of pain.  she is following w/ Dr. Chow routinely.  she denies any GI complaints at this time. ROS otherwise negative.  -----------------------------------------------  -7/2023 CT Abdomen showed Since 4/25/2023, new CBD dilation up to 8 mm (previously 4 mm) and pancreatic duct dilation up to 6 mm with new hepatic segment IVb ill-defined hypodensity. Stable uncinate process 1.3 cm hypodense cystic lesion.  -CT abdomen 4/25/2023 showed 1.3 cm hypodensity/cyst in the uncinate process. No main duct dilatation. There is mild haziness/fat stranding in the region of the pancreaticoduodenal groove. AT that time in april 2023 pain not typical, but similar to current pain and lipase 61 -US in 4/2023 showed CBD 7mm, no gall stones  -CTA 2021 showed 1 cm pancreatic uncinate process hypodensity likely IPMN - PD was dilated as well on that exam  -----------------------------------------------  7/6/2023: EGD: Hill grade 2 hiatal hernia, nonerosive gastritis (biopsy), normal duodenum (biopsy). EUS: In the pancreatic head, there was an anechoic, homogeneous, and round, cyst communicating with the PD consistent with SB-IPMN measuring 15 mm x 12 mm and a normal PD measuring 3-4 mm. Within the cyst, there was a freely floating / mobile hypoechoic, non-enhancing, round intra-cystic structure with a smooth hyperechoic rim and smooth edges, suggestive of a mucin ball. No mural nodules, septation, calcification or other high-risk features were noted. EUS: Areas of heterogeneous hypoechoic and isoechoic foci consistent with history of recent acute pancreatitis. Otherwise normal exam of the BOP and TOP with a PD measuring 1-2 mm. There was a dilated side-branch to 3 mm in the TOP. EUS. The gallbladder was identified with evidence of biliary sludge and a small stone / sludge in the cystic duct. [de-identified] : 7/6/23

## 2023-08-22 ENCOUNTER — OUTPATIENT (OUTPATIENT)
Dept: OUTPATIENT SERVICES | Facility: HOSPITAL | Age: 68
LOS: 1 days | End: 2023-08-22
Payer: MEDICARE

## 2023-08-22 ENCOUNTER — RESULT REVIEW (OUTPATIENT)
Age: 68
End: 2023-08-22

## 2023-08-22 DIAGNOSIS — Z98.890 OTHER SPECIFIED POSTPROCEDURAL STATES: Chronic | ICD-10-CM

## 2023-08-22 DIAGNOSIS — Z00.8 ENCOUNTER FOR OTHER GENERAL EXAMINATION: ICD-10-CM

## 2023-08-22 DIAGNOSIS — Z98.61 CORONARY ANGIOPLASTY STATUS: Chronic | ICD-10-CM

## 2023-08-22 PROCEDURE — 74183 MRI ABD W/O CNTR FLWD CNTR: CPT | Mod: 26

## 2023-08-22 PROCEDURE — 74183 MRI ABD W/O CNTR FLWD CNTR: CPT

## 2023-08-22 PROCEDURE — A9579: CPT

## 2023-08-23 DIAGNOSIS — Z00.8 ENCOUNTER FOR OTHER GENERAL EXAMINATION: ICD-10-CM

## 2023-09-06 ENCOUNTER — OUTPATIENT (OUTPATIENT)
Dept: OUTPATIENT SERVICES | Facility: HOSPITAL | Age: 68
LOS: 1 days | End: 2023-09-06

## 2023-09-06 DIAGNOSIS — Z98.61 CORONARY ANGIOPLASTY STATUS: Chronic | ICD-10-CM

## 2023-09-06 DIAGNOSIS — K85.10 BILIARY ACUTE PANCREATITIS WITHOUT NECROSIS OR INFECTION: ICD-10-CM

## 2023-09-06 DIAGNOSIS — Z98.890 OTHER SPECIFIED POSTPROCEDURAL STATES: Chronic | ICD-10-CM

## 2023-09-06 DIAGNOSIS — K76.9 LIVER DISEASE, UNSPECIFIED: ICD-10-CM

## 2023-09-07 DIAGNOSIS — K85.10 BILIARY ACUTE PANCREATITIS WITHOUT NECROSIS OR INFECTION: ICD-10-CM

## 2023-09-07 DIAGNOSIS — K76.9 LIVER DISEASE, UNSPECIFIED: ICD-10-CM

## 2023-09-18 LAB
AFP-TM SERPL-MCNC: 2.2 NG/ML
CEA SERPL-MCNC: 0.6 NG/ML

## 2023-09-21 ENCOUNTER — APPOINTMENT (OUTPATIENT)
Dept: SURGERY | Facility: CLINIC | Age: 68
End: 2023-09-21
Payer: MEDICARE

## 2023-09-21 DIAGNOSIS — K86.2 CYST OF PANCREAS: ICD-10-CM

## 2023-09-21 DIAGNOSIS — K85.10 BILIARY ACUTE PANCREATITIS WITHOUT NECROSIS OR INFECTION: ICD-10-CM

## 2023-09-21 DIAGNOSIS — K76.9 LIVER DISEASE, UNSPECIFIED: ICD-10-CM

## 2023-09-21 PROCEDURE — 99443: CPT | Mod: 24

## 2023-09-25 PROBLEM — K76.9 LIVER LESION: Status: ACTIVE | Noted: 2023-07-31

## 2023-09-25 PROBLEM — K85.10 GALLSTONE PANCREATITIS: Status: ACTIVE | Noted: 2023-07-07

## 2023-12-28 ENCOUNTER — APPOINTMENT (OUTPATIENT)
Dept: GASTROENTEROLOGY | Facility: CLINIC | Age: 68
End: 2023-12-28

## 2024-01-08 NOTE — ASU DISCHARGE PLAN (ADULT/PEDIATRIC) - MODE OF TRANSPORTATION
Ok to start methotrexate  We can send methotrexate 10mg/wk x 2 weeks, then 15mg/wk x 2 weeks and stay at that dose, plus folic acid 1mg/day. Labs at 2, weeks, then 1 month, then q3 months   Ambulatory Wheelchair/Stroller

## 2024-01-29 ENCOUNTER — APPOINTMENT (OUTPATIENT)
Dept: CARDIOLOGY | Facility: CLINIC | Age: 69
End: 2024-01-29

## 2024-02-15 NOTE — ED PROVIDER NOTE - CARE PLAN
Principal Discharge DX:	Pancreatitis  Secondary Diagnosis:	Hepatic lesion  Secondary Diagnosis:	Intractable abdominal pain   Calm 1

## 2024-03-09 ENCOUNTER — INPATIENT (INPATIENT)
Facility: HOSPITAL | Age: 69
LOS: 1 days | Discharge: HOME | DRG: 287 | End: 2024-03-11
Attending: STUDENT IN AN ORGANIZED HEALTH CARE EDUCATION/TRAINING PROGRAM | Admitting: INTERNAL MEDICINE
Payer: MEDICARE

## 2024-03-09 VITALS
WEIGHT: 179.9 LBS | HEART RATE: 74 BPM | OXYGEN SATURATION: 99 % | TEMPERATURE: 98 F | RESPIRATION RATE: 18 BRPM | SYSTOLIC BLOOD PRESSURE: 161 MMHG | DIASTOLIC BLOOD PRESSURE: 72 MMHG

## 2024-03-09 DIAGNOSIS — Z98.890 OTHER SPECIFIED POSTPROCEDURAL STATES: Chronic | ICD-10-CM

## 2024-03-09 DIAGNOSIS — Z98.61 CORONARY ANGIOPLASTY STATUS: Chronic | ICD-10-CM

## 2024-03-09 LAB
ALBUMIN SERPL ELPH-MCNC: 4.5 G/DL — SIGNIFICANT CHANGE UP (ref 3.5–5.2)
ALP SERPL-CCNC: 104 U/L — SIGNIFICANT CHANGE UP (ref 30–115)
ALT FLD-CCNC: 17 U/L — SIGNIFICANT CHANGE UP (ref 0–41)
ANION GAP SERPL CALC-SCNC: 12 MMOL/L — SIGNIFICANT CHANGE UP (ref 7–14)
AST SERPL-CCNC: 22 U/L — SIGNIFICANT CHANGE UP (ref 0–41)
BASOPHILS # BLD AUTO: 0.05 K/UL — SIGNIFICANT CHANGE UP (ref 0–0.2)
BASOPHILS NFR BLD AUTO: 1.1 % — HIGH (ref 0–1)
BILIRUB SERPL-MCNC: 0.2 MG/DL — SIGNIFICANT CHANGE UP (ref 0.2–1.2)
BUN SERPL-MCNC: 25 MG/DL — HIGH (ref 10–20)
CALCIUM SERPL-MCNC: 9.8 MG/DL — SIGNIFICANT CHANGE UP (ref 8.4–10.5)
CHLORIDE SERPL-SCNC: 103 MMOL/L — SIGNIFICANT CHANGE UP (ref 98–110)
CO2 SERPL-SCNC: 25 MMOL/L — SIGNIFICANT CHANGE UP (ref 17–32)
CREAT SERPL-MCNC: 1 MG/DL — SIGNIFICANT CHANGE UP (ref 0.7–1.5)
EGFR: 61 ML/MIN/1.73M2 — SIGNIFICANT CHANGE UP
EOSINOPHIL # BLD AUTO: 0.09 K/UL — SIGNIFICANT CHANGE UP (ref 0–0.7)
EOSINOPHIL NFR BLD AUTO: 1.9 % — SIGNIFICANT CHANGE UP (ref 0–8)
GLUCOSE SERPL-MCNC: 125 MG/DL — HIGH (ref 70–99)
HCT VFR BLD CALC: 38.2 % — SIGNIFICANT CHANGE UP (ref 37–47)
HGB BLD-MCNC: 12.4 G/DL — SIGNIFICANT CHANGE UP (ref 12–16)
IMM GRANULOCYTES NFR BLD AUTO: 0 % — LOW (ref 0.1–0.3)
LYMPHOCYTES # BLD AUTO: 2.87 K/UL — SIGNIFICANT CHANGE UP (ref 1.2–3.4)
LYMPHOCYTES # BLD AUTO: 61.1 % — HIGH (ref 20.5–51.1)
MCHC RBC-ENTMCNC: 27.8 PG — SIGNIFICANT CHANGE UP (ref 27–31)
MCHC RBC-ENTMCNC: 32.5 G/DL — SIGNIFICANT CHANGE UP (ref 32–37)
MCV RBC AUTO: 85.7 FL — SIGNIFICANT CHANGE UP (ref 81–99)
MONOCYTES # BLD AUTO: 0.38 K/UL — SIGNIFICANT CHANGE UP (ref 0.1–0.6)
MONOCYTES NFR BLD AUTO: 8.1 % — SIGNIFICANT CHANGE UP (ref 1.7–9.3)
NEUTROPHILS # BLD AUTO: 1.31 K/UL — LOW (ref 1.4–6.5)
NEUTROPHILS NFR BLD AUTO: 27.8 % — LOW (ref 42.2–75.2)
NRBC # BLD: 0 /100 WBCS — SIGNIFICANT CHANGE UP (ref 0–0)
PLATELET # BLD AUTO: 299 K/UL — SIGNIFICANT CHANGE UP (ref 130–400)
PMV BLD: 9.3 FL — SIGNIFICANT CHANGE UP (ref 7.4–10.4)
POTASSIUM SERPL-MCNC: 4.7 MMOL/L — SIGNIFICANT CHANGE UP (ref 3.5–5)
POTASSIUM SERPL-SCNC: 4.7 MMOL/L — SIGNIFICANT CHANGE UP (ref 3.5–5)
PROT SERPL-MCNC: 7.1 G/DL — SIGNIFICANT CHANGE UP (ref 6–8)
RBC # BLD: 4.46 M/UL — SIGNIFICANT CHANGE UP (ref 4.2–5.4)
RBC # FLD: 14.7 % — HIGH (ref 11.5–14.5)
SODIUM SERPL-SCNC: 140 MMOL/L — SIGNIFICANT CHANGE UP (ref 135–146)
TROPONIN T, HIGH SENSITIVITY RESULT: 10 NG/L — SIGNIFICANT CHANGE UP (ref 6–13)
TROPONIN T, HIGH SENSITIVITY RESULT: 10 NG/L — SIGNIFICANT CHANGE UP (ref 6–13)
WBC # BLD: 4.7 K/UL — LOW (ref 4.8–10.8)
WBC # FLD AUTO: 4.7 K/UL — LOW (ref 4.8–10.8)

## 2024-03-09 PROCEDURE — 99285 EMERGENCY DEPT VISIT HI MDM: CPT

## 2024-03-09 PROCEDURE — 71045 X-RAY EXAM CHEST 1 VIEW: CPT | Mod: 26

## 2024-03-09 RX ORDER — ASPIRIN/CALCIUM CARB/MAGNESIUM 324 MG
243 TABLET ORAL ONCE
Refills: 0 | Status: COMPLETED | OUTPATIENT
Start: 2024-03-09 | End: 2024-03-09

## 2024-03-09 RX ADMIN — Medication 243 MILLIGRAM(S): at 21:44

## 2024-03-09 NOTE — ED PROVIDER NOTE - CLINICAL SUMMARY MEDICAL DECISION MAKING FREE TEXT BOX
67 yo F presented to ED with chest pain. Labs and EKG were ordered and reviewed. Trop 10 x2. Imaging was ordered and reviewed by me. No signs of pneumonia on CXR. Appropriate medications for patient's presenting complaints were ordered and effects were reassessed.  Patient's records (prior hospital, ED visit, and/or nursing home notes if available) were reviewed.  Additional history was obtained from EMS, family, and/or PCP (where available).  Escalation to admission/observation was considered. Patient placed in ED OBS for continued cardiac workup.

## 2024-03-09 NOTE — ED PROVIDER NOTE - WR ORDER STATUS 1
Patient tolerated infusion well. Will monitor for the next hour, patient is agreeable to this.       Jada Syed RN  09/18/21 [id number] Performed

## 2024-03-09 NOTE — ED PROVIDER NOTE - ATTENDING CONTRIBUTION TO CARE
67 yo F pmhx CAD 2 stents on Plavix, HTN DM presents to ED for eval of CP since yesterday, worsening today, associated with nausea and SOB. CP is left sided radiating to L shoulder. No fevers, cough, numbness, weakness. Nonsmoker. Cardiologist: Porfirio     CONSTITUTIONAL: NAD.   SKIN: warm, dry  HEAD: Normocephalic; atraumatic.  EYES: no conjunctival injection.    ENT: MMM.   NECK: Supple.  CARD: RRR.   RESP: No wheezes, rales or rhonchi.  ABD: soft ntnd.   EXT: Normal ROM.  No lower extremity edema.   NEURO: Alert, oriented, grossly unremarkable.  PSYCH: Cooperative, appropriate.

## 2024-03-09 NOTE — ED ADULT NURSE NOTE - NSFALLRISKFACTORS_ED_ALL_ED
No indicators present 1 Principal Discharge DX:	Multiple contusions  Secondary Diagnosis:	Multiple abrasions

## 2024-03-09 NOTE — ED ADULT NURSE NOTE - NSFALLUNIVINTERV_ED_ALL_ED
Bed/Stretcher in lowest position, wheels locked, appropriate side rails in place/Call bell, personal items and telephone in reach/Instruct patient to call for assistance before getting out of bed/chair/stretcher/Non-slip footwear applied when patient is off stretcher/[place] to call system/Physically safe environment - no spills, clutter or unnecessary equipment/Purposeful proactive rounding/Room/bathroom lighting operational, light cord in reach

## 2024-03-09 NOTE — ED PROVIDER NOTE - OBJECTIVE STATEMENT
Pt is a 67 y/o female with PMH of DM, HTN and CAD s/p PCI (follows with Dr. Monroy) presenting for chest pain x 1 day. Pain is pressure like, to left chest and radiates to left shoulder. Associated with nausea and SOB. No fever, cough, vomiting or diarrhea.

## 2024-03-09 NOTE — ED PROVIDER NOTE - CARE PLAN
1 Principal Discharge DX:	Chest pain   Principal Discharge DX:	Chest pain  Assessment and plan of treatment:	Plan- ekg labs xr reassess

## 2024-03-09 NOTE — ED PROVIDER NOTE - PHYSICAL EXAMINATION
CONST: well appearing for age, NAD  HEAD:  normocephalic, atraumatic  EYES:  conjunctivae without injection, drainage or discharge  ENMT:  nasal mucosa moist; mouth moist without ulcerations or lesions; throat moist without erythema, exudate, ulcerations or lesions  NECK:  supple  CARDIAC:  regular rate and rhythm, normal S1 and S2, no murmurs, rubs or gallops  RESP:  respiratory rate and effort appear normal for age; lungs are clear to auscultation bilaterally; no rales or wheezes  ABDOMEN:  soft, nontender, nondistended  MUSCULOSKELETAL/NEURO: awake and alert, SUBRAMANIAN, normal movement, normal tone  SKIN:  normal skin color for age and race, well-perfused; warm and dry

## 2024-03-10 DIAGNOSIS — R07.9 CHEST PAIN, UNSPECIFIED: ICD-10-CM

## 2024-03-10 LAB — GLUCOSE BLDC GLUCOMTR-MCNC: 166 MG/DL — HIGH (ref 70–99)

## 2024-03-10 PROCEDURE — 93458 L HRT ARTERY/VENTRICLE ANGIO: CPT

## 2024-03-10 PROCEDURE — 82962 GLUCOSE BLOOD TEST: CPT

## 2024-03-10 PROCEDURE — 78452 HT MUSCLE IMAGE SPECT MULT: CPT | Mod: 26,MC

## 2024-03-10 PROCEDURE — 84443 ASSAY THYROID STIM HORMONE: CPT

## 2024-03-10 PROCEDURE — 84439 ASSAY OF FREE THYROXINE: CPT

## 2024-03-10 PROCEDURE — 84480 ASSAY TRIIODOTHYRONINE (T3): CPT

## 2024-03-10 PROCEDURE — 83880 ASSAY OF NATRIURETIC PEPTIDE: CPT

## 2024-03-10 PROCEDURE — 80061 LIPID PANEL: CPT

## 2024-03-10 PROCEDURE — 99223 1ST HOSP IP/OBS HIGH 75: CPT | Mod: 25

## 2024-03-10 PROCEDURE — 36415 COLL VENOUS BLD VENIPUNCTURE: CPT

## 2024-03-10 PROCEDURE — 99223 1ST HOSP IP/OBS HIGH 75: CPT

## 2024-03-10 PROCEDURE — 85027 COMPLETE CBC AUTOMATED: CPT

## 2024-03-10 PROCEDURE — 83735 ASSAY OF MAGNESIUM: CPT

## 2024-03-10 PROCEDURE — 93016 CV STRESS TEST SUPVJ ONLY: CPT

## 2024-03-10 PROCEDURE — 93306 TTE W/DOPPLER COMPLETE: CPT

## 2024-03-10 PROCEDURE — G0378: CPT

## 2024-03-10 PROCEDURE — C1769: CPT

## 2024-03-10 PROCEDURE — 93018 CV STRESS TEST I&R ONLY: CPT

## 2024-03-10 PROCEDURE — 93005 ELECTROCARDIOGRAM TRACING: CPT

## 2024-03-10 PROCEDURE — C1887: CPT

## 2024-03-10 PROCEDURE — 99285 EMERGENCY DEPT VISIT HI MDM: CPT | Mod: 25

## 2024-03-10 PROCEDURE — 80053 COMPREHEN METABOLIC PANEL: CPT

## 2024-03-10 PROCEDURE — C1894: CPT

## 2024-03-10 PROCEDURE — 83036 HEMOGLOBIN GLYCOSYLATED A1C: CPT

## 2024-03-10 RX ORDER — GLUCAGON INJECTION, SOLUTION 0.5 MG/.1ML
1 INJECTION, SOLUTION SUBCUTANEOUS ONCE
Refills: 0 | Status: DISCONTINUED | OUTPATIENT
Start: 2024-03-10 | End: 2024-03-11

## 2024-03-10 RX ORDER — REGADENOSON 0.08 MG/ML
0.4 INJECTION, SOLUTION INTRAVENOUS ONCE
Refills: 0 | Status: DISCONTINUED | OUTPATIENT
Start: 2024-03-10 | End: 2024-03-11

## 2024-03-10 RX ORDER — ASPIRIN/CALCIUM CARB/MAGNESIUM 324 MG
81 TABLET ORAL DAILY
Refills: 0 | Status: DISCONTINUED | OUTPATIENT
Start: 2024-03-10 | End: 2024-03-11

## 2024-03-10 RX ORDER — DEXTROSE 50 % IN WATER 50 %
15 SYRINGE (ML) INTRAVENOUS ONCE
Refills: 0 | Status: DISCONTINUED | OUTPATIENT
Start: 2024-03-10 | End: 2024-03-11

## 2024-03-10 RX ORDER — METFORMIN HYDROCHLORIDE 850 MG/1
1 TABLET ORAL
Refills: 0 | DISCHARGE

## 2024-03-10 RX ORDER — SEMAGLUTIDE 0.68 MG/ML
1 INJECTION, SOLUTION SUBCUTANEOUS
Refills: 0 | DISCHARGE

## 2024-03-10 RX ORDER — LIDOCAINE 4 G/100G
1 CREAM TOPICAL ONCE
Refills: 0 | Status: COMPLETED | OUTPATIENT
Start: 2024-03-10 | End: 2024-03-10

## 2024-03-10 RX ORDER — INSULIN LISPRO 100/ML
VIAL (ML) SUBCUTANEOUS
Refills: 0 | Status: DISCONTINUED | OUTPATIENT
Start: 2024-03-10 | End: 2024-03-11

## 2024-03-10 RX ORDER — ASPIRIN/CALCIUM CARB/MAGNESIUM 324 MG
1 TABLET ORAL
Qty: 0 | Refills: 0 | DISCHARGE

## 2024-03-10 RX ORDER — DEXTROSE 50 % IN WATER 50 %
25 SYRINGE (ML) INTRAVENOUS ONCE
Refills: 0 | Status: DISCONTINUED | OUTPATIENT
Start: 2024-03-10 | End: 2024-03-11

## 2024-03-10 RX ORDER — SODIUM CHLORIDE 9 MG/ML
1000 INJECTION, SOLUTION INTRAVENOUS
Refills: 0 | Status: DISCONTINUED | OUTPATIENT
Start: 2024-03-10 | End: 2024-03-11

## 2024-03-10 RX ORDER — INSULIN LISPRO 100/ML
VIAL (ML) SUBCUTANEOUS AT BEDTIME
Refills: 0 | Status: DISCONTINUED | OUTPATIENT
Start: 2024-03-10 | End: 2024-03-11

## 2024-03-10 RX ORDER — ATORVASTATIN CALCIUM 80 MG/1
80 TABLET, FILM COATED ORAL AT BEDTIME
Refills: 0 | Status: DISCONTINUED | OUTPATIENT
Start: 2024-03-10 | End: 2024-03-11

## 2024-03-10 RX ORDER — METOPROLOL TARTRATE 50 MG
25 TABLET ORAL
Refills: 0 | Status: DISCONTINUED | OUTPATIENT
Start: 2024-03-10 | End: 2024-03-11

## 2024-03-10 RX ORDER — CLOPIDOGREL BISULFATE 75 MG/1
75 TABLET, FILM COATED ORAL DAILY
Refills: 0 | Status: DISCONTINUED | OUTPATIENT
Start: 2024-03-10 | End: 2024-03-11

## 2024-03-10 RX ORDER — SODIUM CHLORIDE 9 MG/ML
1000 INJECTION INTRAMUSCULAR; INTRAVENOUS; SUBCUTANEOUS
Refills: 0 | Status: DISCONTINUED | OUTPATIENT
Start: 2024-03-10 | End: 2024-03-11

## 2024-03-10 RX ORDER — LISINOPRIL 2.5 MG/1
20 TABLET ORAL DAILY
Refills: 0 | Status: DISCONTINUED | OUTPATIENT
Start: 2024-03-10 | End: 2024-03-10

## 2024-03-10 RX ORDER — CLOPIDOGREL BISULFATE 75 MG/1
75 TABLET, FILM COATED ORAL ONCE
Refills: 0 | Status: COMPLETED | OUTPATIENT
Start: 2024-03-10 | End: 2024-03-10

## 2024-03-10 RX ORDER — DEXTROSE 50 % IN WATER 50 %
12.5 SYRINGE (ML) INTRAVENOUS ONCE
Refills: 0 | Status: DISCONTINUED | OUTPATIENT
Start: 2024-03-10 | End: 2024-03-11

## 2024-03-10 RX ADMIN — LIDOCAINE 1 PATCH: 4 CREAM TOPICAL at 21:21

## 2024-03-10 RX ADMIN — Medication 25 MILLIGRAM(S): at 17:49

## 2024-03-10 RX ADMIN — CLOPIDOGREL BISULFATE 75 MILLIGRAM(S): 75 TABLET, FILM COATED ORAL at 05:46

## 2024-03-10 RX ADMIN — Medication 81 MILLIGRAM(S): at 05:46

## 2024-03-10 RX ADMIN — Medication 25 MILLIGRAM(S): at 05:47

## 2024-03-10 RX ADMIN — ATORVASTATIN CALCIUM 80 MILLIGRAM(S): 80 TABLET, FILM COATED ORAL at 00:48

## 2024-03-10 RX ADMIN — LISINOPRIL 20 MILLIGRAM(S): 2.5 TABLET ORAL at 05:46

## 2024-03-10 RX ADMIN — Medication 81 MILLIGRAM(S): at 13:28

## 2024-03-10 RX ADMIN — SODIUM CHLORIDE 75 MILLILITER(S): 9 INJECTION INTRAMUSCULAR; INTRAVENOUS; SUBCUTANEOUS at 22:04

## 2024-03-10 RX ADMIN — Medication 0: at 21:21

## 2024-03-10 RX ADMIN — ATORVASTATIN CALCIUM 80 MILLIGRAM(S): 80 TABLET, FILM COATED ORAL at 21:22

## 2024-03-10 NOTE — H&P ADULT - NSHPLABSRESULTS_GEN_ALL_CORE
- ECG (date 3/9): SR at 68 bpm  - Radiology:  - Chest x-ray (3/9/24): no radiographic evidence of cardiopulmonary disease  - Nuclear stress (3/10/24): moderate size reversible defect in the apex and inferolateral wall of the left ventricle superimposed on small anterolateral wall scar, fixed small defect in the inferolateral wall.  Normal global LV wall motion.  EF 66%    - Labs:                        12.4   4.70  )-----------( 299      ( 09 Mar 2024 20:57 )             38.2     03-09    140  |  103  |  25<H>  ----------------------------<  125<H>  4.7   |  25  |  1.0    Ca    9.8      09 Mar 2024 20:57    TPro  7.1  /  Alb  4.5  /  TBili  0.2  /  DBili  x   /  AST  22  /  ALT  17  /  AlkPhos  104  03-09    LIVER FUNCTIONS - ( 09 Mar 2024 20:57 )  Alb: 4.5 g/dL / Pro: 7.1 g/dL / ALK PHOS: 104 U/L / ALT: 17 U/L / AST: 22 U/L / GGT: x               troponin 10 -> 10      Lactate Trend    Urinalysis Basic - ( 09 Mar 2024 20:57 )    Color: x / Appearance: x / SG: x / pH: x  Gluc: 125 mg/dL / Ketone: x  / Bili: x / Urobili: x   Blood: x / Protein: x / Nitrite: x   Leuk Esterase: x / RBC: x / WBC x   Sq Epi: x / Non Sq Epi: x / Bacteria: x

## 2024-03-10 NOTE — H&P ADULT - ASSESSMENT
Assessment:  Patient is a 68 year old female with PMHx of CAD s/p PCI, DM, and HTN presents with left sided chest pain x 1 day and a positive nuclear stress test.  Patient will be admitted to cardiac telemetry for further management.    Plan:   #CAD  - 2d echo  - FU TSH, Lipid profile, A1C  - Continue atorvastatin 80 mg QD  - Continue metoprolol tartrate 25 mg PO BID  - Continue aspirin 81 mg and plavix 75 mg QD  - NPO after midnight   - Plan for LHC tomorrow       #HTN  - Continue metoprolol tartrate 25 mg PO BID  - Hold lisinopril 20 mg daily (resume after LHC)     #DM  - ISS

## 2024-03-10 NOTE — ED CDU PROVIDER DISPOSITION NOTE - CLINICAL COURSE
Patient presented with chest pain, EKG troponin negative x 2, stress test positive patient with history of CAD and stents, admit to cardiology telemetry

## 2024-03-10 NOTE — H&P ADULT - TIME BILLING
Interviewed and examined patient. Reviewed hospital course, ECG, TTE,, lab work, and medications. Discussed plan with patient and daughter

## 2024-03-10 NOTE — H&P ADULT - HISTORY OF PRESENT ILLNESS
Patient is a 68 year old female with PMHx of CAD s/p PCI, DM, and HTN presents to [facility] with left sided chest pain x 1 day.      Patient denies fever, chills, cough, orthopnea, PND, LE edema, palpitations, dizziness, syncope, vomiting, diarrhea, constipation      In the ED:  Vital signs: BP /60-72 mmHg, HR 62-74 bpm, temp 98 degrees F, RR 18, O2 sat 99% on room air  Lab findings troponin 10 -> 10,   ECG showed SR at 68 bpm  Chest x-ray showed no radiographic evidence of cardiopulmonary disease  Nuclear stress showed moderate size reversible defect in the apex and inferolateral wall of the left ventricle superimposed on small anterolateral wall scar, fixed small defect in the inferolateral wall.  Normal global LV wall motion.  EF 66%  In the ED patient was loaded with aspirin 243 mg PO x1, given plavix 75 mg x1, given lexiscan 0.4 mg IV x1, and resumed home aspirin 81 mg, lisinopril 20 mg PO QD, metoprolol tartrate 25 mg PO BID, and atorvastatin 80 mg PO QD,  Patient is a 68 year old female with PMHx of CAD s/p PCI, DM, and HTN presents to [facility] with left sided chest pain x 2 days.  Patient reported she was hugging her grandson when she suddenly felt "burning/squeezing" chest pain which radiates to her Left shoulder.    Patient denies fever, chills, cough, orthopnea, PND, LE edema, palpitations, dizziness, syncope, vomiting, diarrhea, constipation      In the ED:  Vital signs: BP /60-72 mmHg, HR 62-74 bpm, temp 98 degrees F, RR 18, O2 sat 99% on room air  Lab findings troponin 10 -> 10,   ECG showed SR at 68 bpm  Chest x-ray showed no radiographic evidence of cardiopulmonary disease  Nuclear stress showed moderate size reversible defect in the apex and inferolateral wall of the left ventricle superimposed on small anterolateral wall scar, fixed small defect in the inferolateral wall.  Normal global LV wall motion.  EF 66%  In the ED patient was loaded with aspirin 243 mg PO x1, given plavix 75 mg x1, given lexiscan 0.4 mg IV x1, and resumed home aspirin 81 mg, lisinopril 20 mg PO QD, metoprolol tartrate 25 mg PO BID, and atorvastatin 80 mg PO QD,  Patient is a 68 year old Macedonian speaking female with PMHx of CAD s/p PCI, DM, and HTN presents to [facility] with left sided chest pain x 2 days and dyspnea on exertion.  Patient reported she was hugging her grandson when she suddenly felt "burning/squeezing" chest pain which radiates to her left shoulder. Patient reports the pain is similar to when she originally had the stents placed. Patient denies fever, chills, cough, orthopnea, PND, LE edema, palpitations, dizziness, syncope, vomiting, diarrhea, constipation.       In the ED:  Vital signs: BP /60-72 mmHg, HR 62-74 bpm, temp 98 degrees F, RR 18, O2 sat 99% on room air  Lab findings troponin 10 -> 10,   ECG showed SR at 68 bpm  Chest x-ray showed no radiographic evidence of cardiopulmonary disease  Nuclear stress showed moderate size reversible defect in the apex and inferolateral wall of the left ventricle superimposed on small anterolateral wall scar, fixed small defect in the inferolateral wall.  Normal global LV wall motion.  EF 66%  In the ED patient was loaded with aspirin 243 mg PO x1, given plavix 75 mg x1, given lexiscan 0.4 mg IV x1, and resumed home aspirin 81 mg, lisinopril 20 mg PO QD, metoprolol tartrate 25 mg PO BID, and atorvastatin 80 mg PO QD,

## 2024-03-10 NOTE — ED ADULT NURSE REASSESSMENT NOTE - NS ED NURSE REASSESS COMMENT FT1
Pt a&ox4, aware of plan of care for morning. Pt aware that she is going to stress test at some point. No other questions by pt. No complaints noted as well. Resting comfortably.

## 2024-03-10 NOTE — H&P ADULT - NSHPPHYSICALEXAM_GEN_ALL_CORE
VITALS:   T(C): 36.4 (03-10-24 @ 07:43), Max: 36.7 (03-09-24 @ 19:34)  HR: 62 (03-10-24 @ 07:43) (62 - 74)  BP: 130/57 (03-10-24 @ 07:43) (98/50 - 161/72)  RR: 18 (03-10-24 @ 07:43) (18 - 18)  SpO2: 97% (03-10-24 @ 07:43) (95% - 99%)    GENERAL: NAD, lying in bed comfortably  HEAD:  Atraumatic, normocephalic  EYES: EOMI, PERRLA, conjunctiva and sclera clear  ENT: Moist mucous membranes  NECK: Supple, no JVD  HEART: Regular rate and rhythm, no murmurs, rubs, or gallops  LUNGS: Unlabored respirations.  Clear to auscultation bilaterally, no crackles, wheezing, or rhonchi  ABDOMEN: Soft, nontender, nondistended, +BS  EXTREMITIES: 2+ peripheral pulses bilaterally. No clubbing, cyanosis, or edema  NERVOUS SYSTEM:  A&Ox3, no focal deficits   SKIN: No rashes or lesions

## 2024-03-10 NOTE — PATIENT PROFILE ADULT - FALL HARM RISK - RISK INTERVENTIONS

## 2024-03-10 NOTE — ED CDU PROVIDER DISPOSITION NOTE - ATTENDING CONTRIBUTION TO CARE
Patient Latvian speaking, sister-in-law Shahla Haas at bedside translating at patient request, declined  phone    68-year-old female past medical history of CAD stents diabetes hypertension non-smoker no family history follows with Dr. Monroy, presented with intermittent chest pain x 2 days.  Left-sided burning sensation present at rest feels similar to prior when she needed a stent.  No fever cough.  No tearing back pain.  No leg pain swelling immobilization hormones hemoptysis.  Patient seen in ED, had EKG that was normal sinus, troponin 10 x 2, chest x-ray clear, placed in observation unit for stress testing.  Patient states she cannot exercise, stress test changed to nuclear.  No overnight events no active chest pain at this time.    On exam, AFVSS, Well appearing, No acute distress, NCAT, EOMI, PERRLA, MMM, Neck supple, LCTAB, RRR nl s1s2 No mrg, Abdomen Soft NTND, AAOx3, No Focal Deficits, No LE edema or calf TTP,    A/P; chest pain, EKG troponin negative x 2, chest x-ray clear, stress test concerning for ischemia, cardiology consult admit to cardiac telemetry

## 2024-03-10 NOTE — ED ADULT NURSE REASSESSMENT NOTE - NS ED NURSE REASSESS COMMENT FT1
Pt noted sleeping throughout the night, no complaints or issues noted at this time, hourly rounding done, safety precautions maintained.  all needs attended at this time.

## 2024-03-10 NOTE — ED CDU PROVIDER INITIAL DAY NOTE - CLINICAL SUMMARY MEDICAL DECISION MAKING FREE TEXT BOX
69 yo F presented to ED with chest pain. Labs and EKG were ordered and reviewed. Trop 10 x2. Imaging was ordered and reviewed by me. No signs of pneumonia on CXR. Appropriate medications for patient's presenting complaints were ordered and effects were reassessed.  Patient's records (prior hospital, ED visit, and/or nursing home notes if available) were reviewed.  Additional history was obtained from EMS, family, and/or PCP (where available).  Escalation to admission/observation was considered. Patient placed in ED OBS for continued cardiac workup.

## 2024-03-10 NOTE — H&P ADULT - NS ATTEND AMEND GEN_ALL_CORE FT
Agree with assessment and plan above, unless otherwise documented in my attestation.    Ms Shoemaker is a 68yoF with PMHx of CAD s/p STEMI with RCA PCI and then staged D1 PCI ~6-7 years ago, DM, and HTN who presented with atypical chest pain. She says that her pain started Friday, and has persisted, even present during interview. The pain was substernal and not associated with exertion. ECG and troponin (x2) are negative, NST was abnormal (which is new from 2021).  -Admit to 4T  -Will discuss with Dr Monroy, and likely plan for [facility]  tomorrow tomorrow  -Continue DAPT with ASA/plavix  -Continue statin  -Order TTE

## 2024-03-11 ENCOUNTER — RESULT REVIEW (OUTPATIENT)
Age: 69
End: 2024-03-11

## 2024-03-11 ENCOUNTER — TRANSCRIPTION ENCOUNTER (OUTPATIENT)
Age: 69
End: 2024-03-11

## 2024-03-11 VITALS
HEART RATE: 75 BPM | SYSTOLIC BLOOD PRESSURE: 133 MMHG | TEMPERATURE: 96 F | RESPIRATION RATE: 17 BRPM | DIASTOLIC BLOOD PRESSURE: 64 MMHG

## 2024-03-11 LAB
A1C WITH ESTIMATED AVERAGE GLUCOSE RESULT: 8.3 % — HIGH (ref 4–5.6)
ALBUMIN SERPL ELPH-MCNC: 3.9 G/DL — SIGNIFICANT CHANGE UP (ref 3.5–5.2)
ALP SERPL-CCNC: 80 U/L — SIGNIFICANT CHANGE UP (ref 30–115)
ALT FLD-CCNC: 15 U/L — SIGNIFICANT CHANGE UP (ref 0–41)
ANION GAP SERPL CALC-SCNC: 11 MMOL/L — SIGNIFICANT CHANGE UP (ref 7–14)
AST SERPL-CCNC: 20 U/L — SIGNIFICANT CHANGE UP (ref 0–41)
BILIRUB SERPL-MCNC: 0.3 MG/DL — SIGNIFICANT CHANGE UP (ref 0.2–1.2)
BUN SERPL-MCNC: 20 MG/DL — SIGNIFICANT CHANGE UP (ref 10–20)
CALCIUM SERPL-MCNC: 9.2 MG/DL — SIGNIFICANT CHANGE UP (ref 8.4–10.5)
CHLORIDE SERPL-SCNC: 105 MMOL/L — SIGNIFICANT CHANGE UP (ref 98–110)
CHOLEST SERPL-MCNC: 116 MG/DL — SIGNIFICANT CHANGE UP
CO2 SERPL-SCNC: 23 MMOL/L — SIGNIFICANT CHANGE UP (ref 17–32)
CREAT SERPL-MCNC: 0.8 MG/DL — SIGNIFICANT CHANGE UP (ref 0.7–1.5)
EGFR: 80 ML/MIN/1.73M2 — SIGNIFICANT CHANGE UP
ESTIMATED AVERAGE GLUCOSE: 192 MG/DL — HIGH (ref 68–114)
GLUCOSE BLDC GLUCOMTR-MCNC: 153 MG/DL — HIGH (ref 70–99)
GLUCOSE BLDC GLUCOMTR-MCNC: 185 MG/DL — HIGH (ref 70–99)
GLUCOSE SERPL-MCNC: 130 MG/DL — HIGH (ref 70–99)
HCT VFR BLD CALC: 35 % — LOW (ref 37–47)
HDLC SERPL-MCNC: 41 MG/DL — LOW
HGB BLD-MCNC: 11.5 G/DL — LOW (ref 12–16)
LIPID PNL WITH DIRECT LDL SERPL: 62 MG/DL — SIGNIFICANT CHANGE UP
MAGNESIUM SERPL-MCNC: 1.4 MG/DL — LOW (ref 1.8–2.4)
MCHC RBC-ENTMCNC: 27.7 PG — SIGNIFICANT CHANGE UP (ref 27–31)
MCHC RBC-ENTMCNC: 32.9 G/DL — SIGNIFICANT CHANGE UP (ref 32–37)
MCV RBC AUTO: 84.3 FL — SIGNIFICANT CHANGE UP (ref 81–99)
NON HDL CHOLESTEROL: 75 MG/DL — SIGNIFICANT CHANGE UP
NRBC # BLD: 0 /100 WBCS — SIGNIFICANT CHANGE UP (ref 0–0)
NT-PROBNP SERPL-SCNC: 45 PG/ML — SIGNIFICANT CHANGE UP (ref 0–300)
PLATELET # BLD AUTO: 232 K/UL — SIGNIFICANT CHANGE UP (ref 130–400)
PMV BLD: 9 FL — SIGNIFICANT CHANGE UP (ref 7.4–10.4)
POTASSIUM SERPL-MCNC: 4.2 MMOL/L — SIGNIFICANT CHANGE UP (ref 3.5–5)
POTASSIUM SERPL-SCNC: 4.2 MMOL/L — SIGNIFICANT CHANGE UP (ref 3.5–5)
PROT SERPL-MCNC: 6.4 G/DL — SIGNIFICANT CHANGE UP (ref 6–8)
RBC # BLD: 4.15 M/UL — LOW (ref 4.2–5.4)
RBC # FLD: 14.7 % — HIGH (ref 11.5–14.5)
SODIUM SERPL-SCNC: 139 MMOL/L — SIGNIFICANT CHANGE UP (ref 135–146)
T3 SERPL-MCNC: 119 NG/DL — SIGNIFICANT CHANGE UP (ref 80–200)
T4 FREE SERPL-MCNC: 1.2 NG/DL — SIGNIFICANT CHANGE UP (ref 0.9–1.8)
TRIGL SERPL-MCNC: 69 MG/DL — SIGNIFICANT CHANGE UP
TSH SERPL-MCNC: 3.59 UIU/ML — SIGNIFICANT CHANGE UP (ref 0.27–4.2)
WBC # BLD: 3.94 K/UL — LOW (ref 4.8–10.8)
WBC # FLD AUTO: 3.94 K/UL — LOW (ref 4.8–10.8)

## 2024-03-11 PROCEDURE — 93010 ELECTROCARDIOGRAM REPORT: CPT | Mod: 59

## 2024-03-11 PROCEDURE — 99222 1ST HOSP IP/OBS MODERATE 55: CPT | Mod: 57

## 2024-03-11 PROCEDURE — 93306 TTE W/DOPPLER COMPLETE: CPT | Mod: 26

## 2024-03-11 PROCEDURE — 93458 L HRT ARTERY/VENTRICLE ANGIO: CPT | Mod: 26

## 2024-03-11 PROCEDURE — 99238 HOSP IP/OBS DSCHRG MGMT 30/<: CPT

## 2024-03-11 RX ORDER — SODIUM CHLORIDE 9 MG/ML
1000 INJECTION INTRAMUSCULAR; INTRAVENOUS; SUBCUTANEOUS
Refills: 0 | Status: DISCONTINUED | OUTPATIENT
Start: 2024-03-11 | End: 2024-03-11

## 2024-03-11 RX ORDER — ASPIRIN/CALCIUM CARB/MAGNESIUM 324 MG
1 TABLET ORAL
Qty: 0 | Refills: 0 | DISCHARGE
Start: 2024-03-11

## 2024-03-11 RX ORDER — MAGNESIUM SULFATE 500 MG/ML
2 VIAL (ML) INJECTION ONCE
Refills: 0 | Status: COMPLETED | OUTPATIENT
Start: 2024-03-11 | End: 2024-03-11

## 2024-03-11 RX ORDER — EZETIMIBE 10 MG/1
1 TABLET ORAL
Qty: 0 | Refills: 0 | DISCHARGE

## 2024-03-11 RX ORDER — PANTOPRAZOLE SODIUM 20 MG/1
40 TABLET, DELAYED RELEASE ORAL
Refills: 0 | Status: DISCONTINUED | OUTPATIENT
Start: 2024-03-11 | End: 2024-03-11

## 2024-03-11 RX ADMIN — LIDOCAINE 1 PATCH: 4 CREAM TOPICAL at 06:23

## 2024-03-11 RX ADMIN — Medication 25 MILLIGRAM(S): at 17:03

## 2024-03-11 RX ADMIN — Medication 25 MILLIGRAM(S): at 05:02

## 2024-03-11 RX ADMIN — Medication 25 GRAM(S): at 08:24

## 2024-03-11 RX ADMIN — Medication 1: at 17:03

## 2024-03-11 RX ADMIN — SODIUM CHLORIDE 75 MILLILITER(S): 9 INJECTION INTRAMUSCULAR; INTRAVENOUS; SUBCUTANEOUS at 09:22

## 2024-03-11 RX ADMIN — SODIUM CHLORIDE 100 MILLILITER(S): 9 INJECTION INTRAMUSCULAR; INTRAVENOUS; SUBCUTANEOUS at 10:11

## 2024-03-11 RX ADMIN — Medication 81 MILLIGRAM(S): at 08:02

## 2024-03-11 RX ADMIN — CLOPIDOGREL BISULFATE 75 MILLIGRAM(S): 75 TABLET, FILM COATED ORAL at 08:02

## 2024-03-11 NOTE — ASU PREOP CHECKLIST - AS TEMP SITE
MATERNAL FETAL MEDICINE IS FOLLOWING THE PATIENT  FOR   1. PIAUDIE ([facility])        SUBJECTIVE:   The patient reports no problems, since last visit    CURRENT MEDICATIONS:  Current Outpatient Medications   Medication Sig Dispense Refill   • Prenatal Vit-Fe Fumarate-FA (Prenatal Vitamins) 28-0.8 MG Tab Take 1 tablet by mouth daily. 90 tablet 5     No current facility-administered medications for this visit.       Allergies as of 03/21/2022   • (No Known Allergies)         PHYSICAL EXAMINATION:  VITAL SIGNS:    Visit Vitals  /86   Ht 5' 5\" (1.651 m)   Wt 85.5 kg (188 lb 8 oz)   LMP 07/05/2021   BMI 31.37 kg/m²       LABORATORY RESULTS SINCE LAST VISIT:      TODAY'S ULTRASOUND SHOWED:  Biophysical 8/8    ASSESSMENT AND PLAN:  A 35w6d pregnancy being followed by [facility] for   PROBLEM 1.    1. TIM ([facility])      PLAN:  1. We will continue twice weekly surveillance with weekly labs  2. Recommend delivery during the 37th week gestation in accordance with ACOG guidelines  3  Continue to follow-up with Betty Sterling MD for routine obstetrical care    Today's office visit consisted of  1. Reviewing chart  2. Reviewing today's ultrasound +/- labs  3. Direct patient care   4. Addressing the patients high risk issue(s)  5. Documentation todays office visit  6. Ordering subsequent ultrasound +/- labs     oral

## 2024-03-11 NOTE — DISCHARGE NOTE PROVIDER - CARE PROVIDER_API CALL
Noé Monroy Y  Interventional Cardiology  [address] [place] [address], [address]  [place] [address]  Phone: [phone]  Fax: [phone]  Follow Up Time: 2 weeks

## 2024-03-11 NOTE — DISCHARGE NOTE PROVIDER - ATTENDING DISCHARGE PHYSICAL EXAMINATION:
Admitted under the impression of unstable angina.  NM stress was positive for ischemia.  LHC demonstrated non-obstructive CAD.  TTE showed normal systolic function.  Stable for discharge with close outpatient cardiology follow up.

## 2024-03-11 NOTE — DISCHARGE NOTE NURSING/CASE MANAGEMENT/SOCIAL WORK - NSDCPEFALRISK_GEN_ALL_CORE
For information on Fall & Injury Prevention, visit: [email or web address] OR  [email or web address] OR  https://www.cdc.gov/steadi/patient.html

## 2024-03-11 NOTE — CONSULT NOTE ADULT - ASSESSMENT
69 y/o lady with history as above, known CAD, s/p MI, s/p PCI of RCA and subsequently PCI of D1, presenting with new onsey angina, was found to have ischemia on nuclear perfusion imaging.    Imaging, labs, ECG reviewed.  Prior cath films reviewed    Suspected Unstable angina    C/w medical therapy  DM, lipid control  Cath today to assess coronary anatomy, patency of the stents.  C/w ASA/Plavix, statin, BB, ACE-I    Further recommendations after cath (i.e. PCI vs. medical therapy).

## 2024-03-11 NOTE — DISCHARGE NOTE PROVIDER - NSDCCPTREATMENT_GEN_ALL_CORE_FT
PRINCIPAL PROCEDURE  Procedure: Left heart cardiac cath  Findings and Treatment: - Please take aspirin 81 mg daily and plavix 75mg, unless directed by your Cardiologist.  - Do not drive or operate heavy machinery for 24 hours.  - After 24 hours, you may shower and remove the dressing from the site.  - Avoid using affected arm for 24 hours.  - No heavy lifting (objects more than 5 lbs) for 1 week.  - Do not bathe or swim for 1 week.   - Do not rub or apply lotion, cream, or powder to the affected site. Leave it open to the air.   - Any sudden swelling, redness, fever, discharge, or severe pain, call your Cardiologist or call the Catheterization Lab at [phone].  - If there is bleeding from the puncture site, apply direct firm pressure on the site and call [phone].     PRINCIPAL PROCEDURE  Procedure: Left heart cardiac cath  Findings and Treatment: - Please take aspirin 81 mg daily and plavix 75mg, unless directed by your Cardiologist.  - Please do not take metformin. You can restart this medication on 3/13/24 in the morning.   - Do not drive or operate heavy machinery for 24 hours.  - After 24 hours, you may shower and remove the dressing from the site.  - Avoid using affected arm for 24 hours.  - No heavy lifting (objects more than 5 lbs) for 1 week.  - Do not bathe or swim for 1 week.   - Do not rub or apply lotion, cream, or powder to the affected site. Leave it open to the air.   - Any sudden swelling, redness, fever, discharge, or severe pain, call your Cardiologist or call the Catheterization Lab at [phone].  - If there is bleeding from the puncture site, apply direct firm pressure on the site and call 911.

## 2024-03-11 NOTE — DISCHARGE NOTE PROVIDER - NSDCMRMEDTOKEN_GEN_ALL_CORE_FT
ezetimibe 10 mg oral tablet: 1 orally once a day  Lipitor 80 mg oral tablet: 1 orally once a day (at bedtime)  lisinopril 20 mg oral tablet: 1 tab(s) orally once a day  Lyrica 150 mg oral capsule: 1 orally 2 times a day  Metoprolol Tartrate 25 mg oral tablet: 1 tab(s) orally 2 times a day  Percocet 5 mg-325 mg oral tablet: 1 tab(s) orally every 6 hours as needed for  severe pain MDD: 5  Plavix 75 mg oral tablet: 1 orally once a day   aspirin 81 mg oral tablet, chewable: 1 tab(s) orally once a day  ezetimibe 10 mg oral tablet: 1 orally once a day  Lipitor 80 mg oral tablet: 1 orally once a day (at bedtime)  lisinopril 20 mg oral tablet: 1 tab(s) orally once a day  Lyrica 150 mg oral capsule: 1 orally 2 times a day  Metoprolol Tartrate 25 mg oral tablet: 1 tab(s) orally 2 times a day  Percocet 5 mg-325 mg oral tablet: 1 tab(s) orally every 6 hours as needed for  severe pain MDD: 5  Plavix 75 mg oral tablet: 1 orally once a day

## 2024-03-11 NOTE — CHART NOTE - NSCHARTNOTEFT_GEN_A_CORE
PRE-OP DIAGNOSIS:  Unstable angina      PROCEDURE:     [x] Coronary Angiogram     [x] LHC     [] LVG     [] RHC     [] Intervention (see below)         PHYSICIAN:  Porfirio    ASSISTANT:         PROCEDURE DESCRIPTION:     Consent:      [x] Patient     [] Family Member     []  Used        Anesthesia:     [] General     [x] Sedation     [x] Local        Access & Closure:     [x] 6 Fr Radial Artery     [] Fr Femoral Artery     [] Fr Femoral Vein     [] Fr Brachial Vein       IV Contrast: 30 mL        Intervention:  n/a      Implants: n/a       FINDINGS:     Coronary Dominance: Co-dominant      LM: No disease    LAD: mild diffuse disease. distal 40%, slow flow in the vessel, c/w microvascular disease. D1 - patent stent    CX: Mild diffuse disease. OM1 - 40% ostial    RCA: Ostal - patent stent. Mid mild disease. Distal - moderate disease                LVEDP: 14 mmHg     EF: % LVG not done       ESTIMATED BLOOD LOSS: < 10 mL        CONDITION:     [x] Good     [] Fair     [] Critical        SPECIMEN REMOVED: N/A       POST-OP DIAGNOSIS:      [] Normal Coronary Angiogram     [x] Mild Coronary Artery Disease (< 50% stenosis)     [] __ Vessel Coronary Artery Disease        PLAN OF CARE:     [x] D/C Home Today     [x] Return to In-patient bed     [] Admit for observation     [] Return for Staged Procedure     [] CT Surgery Consult     [x] Medications:     [x] IV Fluids: 100 cc/hr for 5 hours

## 2024-03-11 NOTE — CONSULT NOTE ADULT - SUBJECTIVE AND OBJECTIVE BOX
Patient is a 68y old  Female who presents with a chief complaint of Chest pain x 2 days (10 Mar 2024 13:03)      HPI:  Patient is a 68 year old Malay speaking female with PMHx of CAD s/p PCI, DM, and HTN presents to [facility] with left sided chest pain x 2 days and dyspnea on exertion.  Patient reported she was hugging her grandson when she suddenly felt "burning/squeezing" chest pain which radiates to her left shoulder. Patient reports the pain is similar to when she originally had the stents placed. Patient denies fever, chills, cough, orthopnea, PND, LE edema, palpitations, dizziness, syncope, vomiting, diarrhea, constipation.       In the ED:  Vital signs: BP /60-72 mmHg, HR 62-74 bpm, temp 98 degrees F, RR 18, O2 sat 99% on room air  Lab findings troponin 10 -> 10,   ECG showed SR at 68 bpm  Chest x-ray showed no radiographic evidence of cardiopulmonary disease  Nuclear stress showed moderate size reversible defect in the apex and inferolateral wall of the left ventricle superimposed on small anterolateral wall scar, fixed small defect in the inferolateral wall.  Normal global LV wall motion.  EF 66%  In the ED patient was loaded with aspirin 243 mg PO x1, given plavix 75 mg x1, given lexiscan 0.4 mg IV x1, and resumed home aspirin 81 mg, lisinopril 20 mg PO QD, metoprolol tartrate 25 mg PO BID, and atorvastatin 80 mg PO QD,  (10 Mar 2024 13:03)      PAST MEDICAL & SURGICAL HISTORY:  HTN (hypertension)      DM (diabetes mellitus)      CAD (coronary artery disease)      ST elevation myocardial infarction (STEMI), unspecified artery      Hypercholesterolemia      H/O: obesity      S/p bilateral carpal tunnel release      S/P excision of lipoma  shoulder      H/O coronary angioplasty  with 2 stents          PREVIOUS DIAGNOSTIC TESTING:      MEDICATIONS  (STANDING):  aspirin  chewable 81 milliGRAM(s) Oral daily  atorvastatin 80 milliGRAM(s) Oral at bedtime  clopidogrel Tablet 75 milliGRAM(s) Oral daily  dextrose 5%. 1000 milliLiter(s) (50 mL/Hr) IV Continuous <Continuous>  dextrose 5%. 1000 milliLiter(s) (100 mL/Hr) IV Continuous <Continuous>  dextrose 50% Injectable 12.5 Gram(s) IV Push once  dextrose 50% Injectable 25 Gram(s) IV Push once  dextrose 50% Injectable 25 Gram(s) IV Push once  glucagon  Injectable 1 milliGRAM(s) IntraMuscular once  insulin lispro (ADMELOG) corrective regimen sliding scale   SubCutaneous three times a day before meals  insulin lispro (ADMELOG) corrective regimen sliding scale   SubCutaneous at bedtime  magnesium sulfate  IVPB 2 Gram(s) IV Intermittent once  metoprolol tartrate 25 milliGRAM(s) Oral two times a day  regadenoson Injectable 0.4 milliGRAM(s) IV Push once  sodium chloride 0.9%. 1000 milliLiter(s) (75 mL/Hr) IV Continuous <Continuous>    MEDICATIONS  (PRN):  dextrose Oral Gel 15 Gram(s) Oral once PRN Blood Glucose LESS THAN 70 milliGRAM(s)/deciliter  oxycodone    5 mG/acetaminophen 325 mG 1 Tablet(s) Oral every 6 hours PRN for severe pain      FAMILY HISTORY:  No pertinent family history in first degree relatives        SOCIAL HISTORY:  CIGARETTES:  Non-smoker    ALCOHOL:    REVIEW OF SYSTEMS:  As per HPI, otherwise negative        Vital Signs Last 24 Hrs  T(C): 36.7 (11 Mar 2024 07:53), Max: 37.1 (11 Mar 2024 04:07)  T(F): 98.1 (11 Mar 2024 07:53), Max: 98.8 (11 Mar 2024 04:07)  HR: 70 (11 Mar 2024 04:07) (64 - 76)  BP: 125/75 (11 Mar 2024 07:53) (98/63 - 149/68)  BP(mean): 83 (11 Mar 2024 04:07) (74 - 98)  RR: 15 (11 Mar 2024 07:53) (15 - 18)  SpO2: 94% (11 Mar 2024 07:53) (94% - 96%)    Parameters below as of 11 Mar 2024 07:53  Patient On (Oxygen Delivery Method): room air            PHYSICAL EXAM:  GENERAL: NAD, AAO x 3  HEAD:  Atraumatic, Normocephalic  EYES: EOMI, PERRL, conjunctiva and sclera clear  ENMT: Moist mucous membranes  NECK: Supple, No JVD, No bruits  NERVOUS SYSTEM:  Alert & Oriented X3, Good concentration; No focal deficits  CHEST/LUNG: Clear bilaterally; No rales, rhonchi, wheezing, or rubs  HEART: Regular rate and rhythm; Normal S1-S2, No murmurs, rubs, or gallops  ABDOMEN: Soft, Nontender, Nondistended; Bowel sounds present  EXTREMITIES:   No clubbing, cyanosis, or edema  SKIN: No rashes or lesions    INTERPRETATION OF TELEMETRY:    ECG:    I&O's Detail    10 Mar 2024 07:01  -  11 Mar 2024 07:00  --------------------------------------------------------  IN:    Oral Fluid: 60 mL    sodium chloride 0.9%: 600 mL  Total IN: 660 mL    OUT:  Total OUT: 0 mL    Total NET: 660 mL          LABS:                        11.5   3.94  )-----------( 232      ( 11 Mar 2024 06:20 )             35.0     03-11    139  |  105  |  20  ----------------------------<  130<H>  4.2   |  23  |  0.8    Ca    9.2      11 Mar 2024 06:20  Mg     1.4     03-11    TPro  6.4  /  Alb  3.9  /  TBili  0.3  /  DBili  x   /  AST  20  /  ALT  15  /  AlkPhos  80  03-11          Urinalysis Basic - ( 11 Mar 2024 06:20 )    Color: x / Appearance: x / SG: x / pH: x  Gluc: 130 mg/dL / Ketone: x  / Bili: x / Urobili: x   Blood: x / Protein: x / Nitrite: x   Leuk Esterase: x / RBC: x / WBC x   Sq Epi: x / Non Sq Epi: x / Bacteria: x      I&O's Summary    10 Mar 2024 07:01  -  11 Mar 2024 07:00  --------------------------------------------------------  IN: 660 mL / OUT: 0 mL / NET: 660 mL          RADIOLOGY & ADDITIONAL STUDIES:

## 2024-03-11 NOTE — DISCHARGE NOTE NURSING/CASE MANAGEMENT/SOCIAL WORK - PATIENT PORTAL LINK FT
You can access the FollowMyHealth Patient Portal offered by [facility] by registering at the following website: [email or web address]. By joining [facility]’s FollowMyHealth portal, you will also be able to view your health information using other applications (apps) compatible with our system.

## 2024-03-11 NOTE — DISCHARGE NOTE PROVIDER - HOSPITAL COURSE
Patient is a 68y Female with PMHx of CAD s/p PCI, DM, and HTN presented to [facility] with left sided chest pain and dyspnea on exertion.  In the ED troponins were negative x 2 and patient underwent nuclear stress testing that showed moderate size reversible defect in the apex and inferolateral wall of the left ventricle superimposed on small anterolateral wall scar, fixed small defect in the inferolateral wall.  Normal global LV wall motion.  EF 66%.  Patient was loaded on aspirin 243 mg PO x 1 and Lexiscan 0.4 mg IV x1.  Patient was admitted to cardiac telemetry with plan for LHC. On 3/11 patient undernoted LHC which revealed: LM-free of significant disease LAD-mild diffuse disease, distal 40% slow flow in the vessel, D1 patent stent LCX- mild diffuse disease, OM1 40% ostial, RCA-mid RCA mild disease distal moderate disease.   Patient was monitored throughout the day.  Patient remains HD stable with no complaints. Patient remains in SR with no arrhythmias noted on tele. Examination of right radial artery showed a C/DI site with no hematoma, erythema or bruit.  Patient will be discharged home on DAPT with ASA and Plavix.  Patient is being DC home in stable condition. Patient is a 68y Female with PMHx of CAD s/p PCI, DM, and HTN presented to [facility] with left sided chest pain and dyspnea on exertion.  In the ED troponins were negative x 2 and patient underwent nuclear stress testing that showed moderate size reversible defect in the apex and inferolateral wall of the left ventricle superimposed on small anterolateral wall scar, fixed small defect in the inferolateral wall.  Normal global LV wall motion.  EF 66%.  Patient was loaded on aspirin 243 mg PO x 1 and Lexiscan 0.4 mg IV x1.  Patient was admitted to cardiac telemetry with plan for LHC. On 3/11 patient undernoted LHC which revealed: LM-free of significant disease LAD-mild diffuse disease, distal 40% slow flow in the vessel, D1 patent stent LCX- mild diffuse disease, OM1 40% ostial, RCA-mid RCA mild disease distal moderate disease.   Patient was monitored throughout the day.  Patient remains HD stable with no complaints. Patient remains in SR with no arrhythmias noted on tele. Examination of right radial artery showed a C/DI site with no hematoma, erythema or bruit.  Patient will be discharged home on DAPT with ASA and Plavix.  TTE showed EF 55-60%, with mild AR, mild TR.  Patient is being DC home in stable condition.

## 2024-03-11 NOTE — DISCHARGE NOTE PROVIDER - NS AS DC PROVIDER CONTACT Y/N MULTI
Ms Negron is a 75 year-old unknown handed woman with a PMHx of PCKD s/p renal transplant, aneurysm, vascular risk factors (including DM, HLD, HTN) hypothyroidism, glaucoma, cataracts, DVT in leg, initially she was brought to [facility] ER due to the severe headache, right ear pain, neck pain, markedly decreased responsiveness. 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 Currently she remains on mechanical ventilation for air way protection.  Seizure are controlled on Keppra & Vimpat. Patient remains seizure free on EEG.   Continuously gradually titrate down Versed drip and goal is completely stop the versed drip. Plan of care discussed with MICU team.    Plan:  MRI brain is pending.  Discontinue EEG.  Continue Keppra 750 mg BID as per renal dose.   Continue lacosamide 200mg Q12H.  Continue medical management, neuro- check and fall precaution.  GI and DVT prophylaxis.  Patient is at high risk for complications and morbidity or mortality. There is high probability of imminent or life threatening deterioration in the patient's condition.  Patient is unable or incompetent to participate in giving a history and/or making decisions and discussion is necessary for determining treatment decisions.  My cumulative time taking care of this critically ill patient is 30 minutes  If you have any further questions, please do not hesitate to contact our team.  Thank you for allowing us to participate in this patient care.    Vazquez Goodrich MD . Yes

## 2024-03-11 NOTE — CHART NOTE - NSCHARTNOTEFT_GEN_A_CORE
PREOPERATIVE DAY OF PROCEDURE EVALUATION:  I have personally seen and examined the patient.  I agree with the history and physical which I have reviewed and noted any changes below.     68 year old female with PMHx of CAD with prior STEMI s/p PCI/JUSTEN of pRCA 2/2017 and Diag 1 in 3/2017, DM-II, HTN, HLD, who presented to ED with left sided chest pain and shoulder pain x 1 day, and subsequent positive nuclear stress test which revealed moderate-size reversible defect in the apex and inferolateral  wall of the left ventricle consistent with ischemia superimposed on small anterolateral wall scar, EF 66%.  Pt is now referred for LHC with possible intervention if clinically indicated.       Bleeding Risk Score:  1.7%  IVF: NS @ 75/hr since 10pm last night  -on daily baby ASA and Plavix at home, received both now  -on BB, statin    VS : 127/75, 72, 16, 96%  Right Chip: positive  3/9 ECG: NSR @ 68 bpm       (Signed electronically by __________)  03-11-24 @ 08:06

## 2024-03-11 NOTE — DISCHARGE NOTE NURSING/CASE MANAGEMENT/SOCIAL WORK - NSCORESITESY/N_GEN_A_CORE_RD
[Normal Appearance] : normal appearance [Abdomen Soft] : soft [Heart Rate And Rhythm] : Heart rate and rhythm were normal [] : no respiratory distress [Exaggerated Use Of Accessory Muscles For Inspiration] : no accessory muscle use [Oriented To Time, Place, And Person] : oriented to person, place, and time [Normal Station and Gait] : the gait and station were normal for the patient's age [No Focal Deficits] : no focal deficits [Urethral Meatus] : meatus normal [Penis Abnormality] : normal uncircumcised penis [Urinary Bladder Findings] : the bladder was normal on palpation [Testes Tenderness] : no tenderness of the testes No [Testes Mass (___cm)] : there were no testicular masses

## 2024-03-13 ENCOUNTER — NON-APPOINTMENT (OUTPATIENT)
Age: 69
End: 2024-03-13

## 2024-03-14 DIAGNOSIS — I10 ESSENTIAL (PRIMARY) HYPERTENSION: ICD-10-CM

## 2024-03-14 DIAGNOSIS — Z79.82 LONG TERM (CURRENT) USE OF ASPIRIN: ICD-10-CM

## 2024-03-14 DIAGNOSIS — Z79.84 LONG TERM (CURRENT) USE OF ORAL HYPOGLYCEMIC DRUGS: ICD-10-CM

## 2024-03-14 DIAGNOSIS — I25.110 ATHEROSCLEROTIC HEART DISEASE OF NATIVE CORONARY ARTERY WITH UNSTABLE ANGINA PECTORIS: ICD-10-CM

## 2024-03-14 DIAGNOSIS — Z95.5 PRESENCE OF CORONARY ANGIOPLASTY IMPLANT AND GRAFT: ICD-10-CM

## 2024-03-14 DIAGNOSIS — Z79.02 LONG TERM (CURRENT) USE OF ANTITHROMBOTICS/ANTIPLATELETS: ICD-10-CM

## 2024-03-14 DIAGNOSIS — E83.42 HYPOMAGNESEMIA: ICD-10-CM

## 2024-03-14 DIAGNOSIS — I25.2 OLD MYOCARDIAL INFARCTION: ICD-10-CM

## 2024-03-14 DIAGNOSIS — E11.9 TYPE 2 DIABETES MELLITUS WITHOUT COMPLICATIONS: ICD-10-CM

## 2024-08-05 ENCOUNTER — RESULT CHARGE (OUTPATIENT)
Age: 69
End: 2024-08-05

## 2024-08-05 ENCOUNTER — APPOINTMENT (OUTPATIENT)
Dept: CARDIOLOGY | Facility: CLINIC | Age: 69
End: 2024-08-05

## 2024-08-05 PROCEDURE — 99214 OFFICE O/P EST MOD 30 MIN: CPT | Mod: 25

## 2024-08-05 PROCEDURE — G2211 COMPLEX E/M VISIT ADD ON: CPT | Mod: NC

## 2024-08-05 PROCEDURE — 93000 ELECTROCARDIOGRAM COMPLETE: CPT

## 2024-08-05 NOTE — ASSESSMENT
[FreeTextEntry1] : CAD, s/p PCI. HTN, DL, DM Type 2 Dyslipidemia, diabetes. C/w medical therapy. Diet, weight loss discussed. Continued wt. loss encouraged. If stable will continue with secondary prevention, DM control. FS are normal as per daughter - f/u HgA1c with the PMD. She will have bloodwork later this month, will send copy to me as well.   Patient advised to continue with her current DM regimen and to see her PMD to repeat labs      F/u in 6 months.

## 2024-08-05 NOTE — HISTORY OF PRESENT ILLNESS
[FreeTextEntry1] : 69 y/o female with history of DM, HTN, DL, CAD, s/p STEMI, PCI or RCA, subsequent PCI of D1, presents for f/u.  She reports compliance with medical therapy.  LV function was preserved.  Pt denies chest pain,  SOB with exertion, no dizziness, no palpitations.  Pt did not wear an even monitor that was ordered 1 year ago, no further palpitations.  Labs are monitored at PCP office. B/P is controlled.   Compliant with  Atorvastatin 80mg.  As per pt she is mostly sedentary due to B Knee pain.   S/p hospitalization due to cholecystitis and pancreatitis.  S/p cholecystectomy.  07/3/23 HgbA1C 9.1 - repeat labs pending. TTE 03/11/24 EF 55%-60%

## 2024-09-14 ENCOUNTER — RESULT REVIEW (OUTPATIENT)
Age: 69
End: 2024-09-14

## 2024-09-14 ENCOUNTER — OUTPATIENT (OUTPATIENT)
Dept: OUTPATIENT SERVICES | Facility: HOSPITAL | Age: 69
LOS: 1 days | End: 2024-09-14
Payer: MEDICARE

## 2024-09-14 ENCOUNTER — EMERGENCY (EMERGENCY)
Facility: HOSPITAL | Age: 69
LOS: 0 days | Discharge: HOME | End: 2024-09-14
Attending: STUDENT IN AN ORGANIZED HEALTH CARE EDUCATION/TRAINING PROGRAM
Payer: MEDICARE

## 2024-09-14 VITALS
DIASTOLIC BLOOD PRESSURE: 81 MMHG | WEIGHT: 201.94 LBS | TEMPERATURE: 100 F | RESPIRATION RATE: 16 BRPM | SYSTOLIC BLOOD PRESSURE: 134 MMHG | HEART RATE: 105 BPM | HEIGHT: 66 IN | OXYGEN SATURATION: 96 %

## 2024-09-14 DIAGNOSIS — E11.9 TYPE 2 DIABETES MELLITUS WITHOUT COMPLICATIONS: ICD-10-CM

## 2024-09-14 DIAGNOSIS — Z98.890 OTHER SPECIFIED POSTPROCEDURAL STATES: Chronic | ICD-10-CM

## 2024-09-14 DIAGNOSIS — Z98.61 CORONARY ANGIOPLASTY STATUS: Chronic | ICD-10-CM

## 2024-09-14 DIAGNOSIS — E78.5 HYPERLIPIDEMIA, UNSPECIFIED: ICD-10-CM

## 2024-09-14 DIAGNOSIS — R50.9 FEVER, UNSPECIFIED: ICD-10-CM

## 2024-09-14 DIAGNOSIS — K85.10 BILIARY ACUTE PANCREATITIS WITHOUT NECROSIS OR INFECTION: ICD-10-CM

## 2024-09-14 DIAGNOSIS — R19.7 DIARRHEA, UNSPECIFIED: ICD-10-CM

## 2024-09-14 DIAGNOSIS — Z95.5 PRESENCE OF CORONARY ANGIOPLASTY IMPLANT AND GRAFT: ICD-10-CM

## 2024-09-14 DIAGNOSIS — K52.9 NONINFECTIVE GASTROENTERITIS AND COLITIS, UNSPECIFIED: ICD-10-CM

## 2024-09-14 DIAGNOSIS — Z00.8 ENCOUNTER FOR OTHER GENERAL EXAMINATION: ICD-10-CM

## 2024-09-14 DIAGNOSIS — I25.10 ATHEROSCLEROTIC HEART DISEASE OF NATIVE CORONARY ARTERY WITHOUT ANGINA PECTORIS: ICD-10-CM

## 2024-09-14 DIAGNOSIS — I10 ESSENTIAL (PRIMARY) HYPERTENSION: ICD-10-CM

## 2024-09-14 LAB
ALBUMIN SERPL ELPH-MCNC: 4.5 G/DL — SIGNIFICANT CHANGE UP (ref 3.5–5.2)
ALP SERPL-CCNC: 106 U/L — SIGNIFICANT CHANGE UP (ref 30–115)
ALT FLD-CCNC: 15 U/L — SIGNIFICANT CHANGE UP (ref 0–41)
ANION GAP SERPL CALC-SCNC: 13 MMOL/L — SIGNIFICANT CHANGE UP (ref 7–14)
APPEARANCE UR: CLEAR — SIGNIFICANT CHANGE UP
AST SERPL-CCNC: 18 U/L — SIGNIFICANT CHANGE UP (ref 0–41)
BASOPHILS # BLD AUTO: 0.03 K/UL — SIGNIFICANT CHANGE UP (ref 0–0.2)
BASOPHILS NFR BLD AUTO: 0.5 % — SIGNIFICANT CHANGE UP (ref 0–1)
BILIRUB SERPL-MCNC: 0.4 MG/DL — SIGNIFICANT CHANGE UP (ref 0.2–1.2)
BILIRUB UR-MCNC: NEGATIVE — SIGNIFICANT CHANGE UP
BUN SERPL-MCNC: 18 MG/DL — SIGNIFICANT CHANGE UP (ref 10–20)
CALCIUM SERPL-MCNC: 9.1 MG/DL — SIGNIFICANT CHANGE UP (ref 8.4–10.5)
CHLORIDE SERPL-SCNC: 104 MMOL/L — SIGNIFICANT CHANGE UP (ref 98–110)
CO2 SERPL-SCNC: 22 MMOL/L — SIGNIFICANT CHANGE UP (ref 17–32)
COLOR SPEC: YELLOW — SIGNIFICANT CHANGE UP
CREAT SERPL-MCNC: 1.1 MG/DL — SIGNIFICANT CHANGE UP (ref 0.7–1.5)
DIFF PNL FLD: ABNORMAL
EGFR: 54 ML/MIN/1.73M2 — LOW
EGFR: 54 ML/MIN/1.73M2 — LOW
EOSINOPHIL # BLD AUTO: 0.04 K/UL — SIGNIFICANT CHANGE UP (ref 0–0.7)
EOSINOPHIL NFR BLD AUTO: 0.6 % — SIGNIFICANT CHANGE UP (ref 0–8)
GLUCOSE SERPL-MCNC: 95 MG/DL — SIGNIFICANT CHANGE UP (ref 70–99)
GLUCOSE UR QL: NEGATIVE MG/DL — SIGNIFICANT CHANGE UP
HCT VFR BLD CALC: 37.7 % — SIGNIFICANT CHANGE UP (ref 37–47)
HGB BLD-MCNC: 12.5 G/DL — SIGNIFICANT CHANGE UP (ref 12–16)
IMM GRANULOCYTES NFR BLD AUTO: 0.2 % — SIGNIFICANT CHANGE UP (ref 0.1–0.3)
KETONES UR-MCNC: NEGATIVE MG/DL — SIGNIFICANT CHANGE UP
LACTATE SERPL-SCNC: 1.3 MMOL/L — SIGNIFICANT CHANGE UP (ref 0.7–2)
LEUKOCYTE ESTERASE UR-ACNC: ABNORMAL
LIDOCAIN IGE QN: 44 U/L — SIGNIFICANT CHANGE UP (ref 7–60)
LYMPHOCYTES # BLD AUTO: 1.69 K/UL — SIGNIFICANT CHANGE UP (ref 1.2–3.4)
LYMPHOCYTES # BLD AUTO: 27.1 % — SIGNIFICANT CHANGE UP (ref 20.5–51.1)
MCHC RBC-ENTMCNC: 28.7 PG — SIGNIFICANT CHANGE UP (ref 27–31)
MCHC RBC-ENTMCNC: 33.2 G/DL — SIGNIFICANT CHANGE UP (ref 32–37)
MCV RBC AUTO: 86.5 FL — SIGNIFICANT CHANGE UP (ref 81–99)
MONOCYTES # BLD AUTO: 0.57 K/UL — SIGNIFICANT CHANGE UP (ref 0.1–0.6)
MONOCYTES NFR BLD AUTO: 9.1 % — SIGNIFICANT CHANGE UP (ref 1.7–9.3)
NEUTROPHILS # BLD AUTO: 3.89 K/UL — SIGNIFICANT CHANGE UP (ref 1.4–6.5)
NEUTROPHILS NFR BLD AUTO: 62.5 % — SIGNIFICANT CHANGE UP (ref 42.2–75.2)
NITRITE UR-MCNC: NEGATIVE — SIGNIFICANT CHANGE UP
NRBC # BLD: 0 /100 WBCS — SIGNIFICANT CHANGE UP (ref 0–0)
NRBC BLD-RTO: 0 /100 WBCS — SIGNIFICANT CHANGE UP (ref 0–0)
PH UR: 5.5 — SIGNIFICANT CHANGE UP (ref 5–8)
PLATELET # BLD AUTO: 271 K/UL — SIGNIFICANT CHANGE UP (ref 130–400)
PMV BLD: 9.2 FL — SIGNIFICANT CHANGE UP (ref 7.4–10.4)
POTASSIUM SERPL-MCNC: 4.3 MMOL/L — SIGNIFICANT CHANGE UP (ref 3.5–5)
POTASSIUM SERPL-SCNC: 4.3 MMOL/L — SIGNIFICANT CHANGE UP (ref 3.5–5)
PROT SERPL-MCNC: 7.1 G/DL — SIGNIFICANT CHANGE UP (ref 6–8)
PROT UR-MCNC: SIGNIFICANT CHANGE UP MG/DL
RBC # BLD: 4.36 M/UL — SIGNIFICANT CHANGE UP (ref 4.2–5.4)
RBC # FLD: 13.5 % — SIGNIFICANT CHANGE UP (ref 11.5–14.5)
SODIUM SERPL-SCNC: 139 MMOL/L — SIGNIFICANT CHANGE UP (ref 135–146)
SP GR SPEC: 1.02 — SIGNIFICANT CHANGE UP (ref 1–1.03)
UROBILINOGEN FLD QL: 0.2 MG/DL — SIGNIFICANT CHANGE UP (ref 0.2–1)
WBC # BLD: 6.23 K/UL — SIGNIFICANT CHANGE UP (ref 4.8–10.8)
WBC # FLD AUTO: 6.23 K/UL — SIGNIFICANT CHANGE UP (ref 4.8–10.8)

## 2024-09-14 PROCEDURE — 74177 CT ABD & PELVIS W/CONTRAST: CPT | Mod: MC

## 2024-09-14 PROCEDURE — 96375 TX/PRO/DX INJ NEW DRUG ADDON: CPT

## 2024-09-14 PROCEDURE — 83605 ASSAY OF LACTIC ACID: CPT

## 2024-09-14 PROCEDURE — A9579: CPT

## 2024-09-14 PROCEDURE — 99285 EMERGENCY DEPT VISIT HI MDM: CPT

## 2024-09-14 PROCEDURE — 83690 ASSAY OF LIPASE: CPT

## 2024-09-14 PROCEDURE — 85025 COMPLETE CBC W/AUTO DIFF WBC: CPT

## 2024-09-14 PROCEDURE — 36415 COLL VENOUS BLD VENIPUNCTURE: CPT

## 2024-09-14 PROCEDURE — 80053 COMPREHEN METABOLIC PANEL: CPT

## 2024-09-14 PROCEDURE — 74177 CT ABD & PELVIS W/CONTRAST: CPT | Mod: 26,MC

## 2024-09-14 PROCEDURE — 74183 MRI ABD W/O CNTR FLWD CNTR: CPT | Mod: 26

## 2024-09-14 PROCEDURE — 96374 THER/PROPH/DIAG INJ IV PUSH: CPT | Mod: XU

## 2024-09-14 PROCEDURE — 81001 URINALYSIS AUTO W/SCOPE: CPT

## 2024-09-14 PROCEDURE — 99284 EMERGENCY DEPT VISIT MOD MDM: CPT | Mod: 25

## 2024-09-14 PROCEDURE — 74183 MRI ABD W/O CNTR FLWD CNTR: CPT

## 2024-09-14 RX ORDER — ONDANSETRON HCL/PF 4 MG/2 ML
1 VIAL (ML) INJECTION
Qty: 1 | Refills: 0
Start: 2024-09-14 | End: 2024-09-16

## 2024-09-14 RX ORDER — METRONIDAZOLE 250 MG
1 TABLET ORAL
Qty: 21 | Refills: 0
Start: 2024-09-14 | End: 2024-09-20

## 2024-09-14 RX ORDER — ONDANSETRON HCL/PF 4 MG/2 ML
4 VIAL (ML) INJECTION ONCE
Refills: 0 | Status: COMPLETED | OUTPATIENT
Start: 2024-09-14 | End: 2024-09-14

## 2024-09-14 RX ORDER — CIPROFLOXACIN HCL 250 MG
1 TABLET ORAL
Qty: 14 | Refills: 0
Start: 2024-09-14 | End: 2024-09-20

## 2024-09-14 RX ORDER — SODIUM CHLORIDE 9 G/1000ML
1000 INJECTION, SOLUTION INTRAVENOUS ONCE
Refills: 0 | Status: COMPLETED | OUTPATIENT
Start: 2024-09-14 | End: 2024-09-14

## 2024-09-14 RX ORDER — ACETAMINOPHEN 500 MG/5ML
975 LIQUID (ML) ORAL ONCE
Refills: 0 | Status: COMPLETED | OUTPATIENT
Start: 2024-09-14 | End: 2024-09-14

## 2024-09-14 RX ADMIN — Medication 4 MILLIGRAM(S): at 18:31

## 2024-09-14 RX ADMIN — Medication 10 MILLIGRAM(S): at 17:40

## 2024-09-14 RX ADMIN — Medication 20 MILLIGRAM(S): at 16:15

## 2024-09-14 RX ADMIN — Medication 4 MILLIGRAM(S): at 16:15

## 2024-09-14 RX ADMIN — SODIUM CHLORIDE 1000 MILLILITER(S): 9 INJECTION, SOLUTION INTRAVENOUS at 16:15

## 2024-09-14 RX ADMIN — Medication 975 MILLIGRAM(S): at 17:40

## 2024-09-15 DIAGNOSIS — K85.10 BILIARY ACUTE PANCREATITIS WITHOUT NECROSIS OR INFECTION: ICD-10-CM

## 2024-10-09 NOTE — ED PROVIDER NOTE - GASTROINTESTINAL, MLM
Villa called back to say that his PCP added a not to the previous referral. Not seeing it. Made patient aware.   Abdomen soft, non-tender, no guarding.

## 2024-11-05 ENCOUNTER — APPOINTMENT (OUTPATIENT)
Dept: SURGERY | Facility: CLINIC | Age: 69
End: 2024-11-05

## 2025-01-07 ENCOUNTER — EMERGENCY (EMERGENCY)
Facility: HOSPITAL | Age: 70
LOS: 0 days | Discharge: HOME | End: 2025-01-07
Attending: EMERGENCY MEDICINE
Payer: MEDICARE

## 2025-01-07 VITALS
TEMPERATURE: 98 F | SYSTOLIC BLOOD PRESSURE: 129 MMHG | HEIGHT: 65 IN | OXYGEN SATURATION: 96 % | RESPIRATION RATE: 16 BRPM | DIASTOLIC BLOOD PRESSURE: 84 MMHG | HEART RATE: 81 BPM | WEIGHT: 192.02 LBS

## 2025-01-07 DIAGNOSIS — E11.9 TYPE 2 DIABETES MELLITUS WITHOUT COMPLICATIONS: ICD-10-CM

## 2025-01-07 DIAGNOSIS — I25.10 ATHEROSCLEROTIC HEART DISEASE OF NATIVE CORONARY ARTERY WITHOUT ANGINA PECTORIS: ICD-10-CM

## 2025-01-07 DIAGNOSIS — R07.89 OTHER CHEST PAIN: ICD-10-CM

## 2025-01-07 DIAGNOSIS — Z98.61 CORONARY ANGIOPLASTY STATUS: Chronic | ICD-10-CM

## 2025-01-07 DIAGNOSIS — Z95.5 PRESENCE OF CORONARY ANGIOPLASTY IMPLANT AND GRAFT: ICD-10-CM

## 2025-01-07 DIAGNOSIS — R05.3 CHRONIC COUGH: ICD-10-CM

## 2025-01-07 DIAGNOSIS — E78.5 HYPERLIPIDEMIA, UNSPECIFIED: ICD-10-CM

## 2025-01-07 DIAGNOSIS — I10 ESSENTIAL (PRIMARY) HYPERTENSION: ICD-10-CM

## 2025-01-07 DIAGNOSIS — Z98.890 OTHER SPECIFIED POSTPROCEDURAL STATES: Chronic | ICD-10-CM

## 2025-01-07 LAB
ALBUMIN SERPL ELPH-MCNC: 4.2 G/DL — SIGNIFICANT CHANGE UP (ref 3.5–5.2)
ALP SERPL-CCNC: 101 U/L — SIGNIFICANT CHANGE UP (ref 30–115)
ALT FLD-CCNC: 21 U/L — SIGNIFICANT CHANGE UP (ref 0–41)
ANION GAP SERPL CALC-SCNC: 11 MMOL/L — SIGNIFICANT CHANGE UP (ref 7–14)
AST SERPL-CCNC: 44 U/L — HIGH (ref 0–41)
BASOPHILS # BLD AUTO: 0.04 K/UL — SIGNIFICANT CHANGE UP (ref 0–0.2)
BASOPHILS NFR BLD AUTO: 0.7 % — SIGNIFICANT CHANGE UP (ref 0–1)
BILIRUB SERPL-MCNC: 0.3 MG/DL — SIGNIFICANT CHANGE UP (ref 0.2–1.2)
BUN SERPL-MCNC: 13 MG/DL — SIGNIFICANT CHANGE UP (ref 10–20)
CALCIUM SERPL-MCNC: 9.3 MG/DL — SIGNIFICANT CHANGE UP (ref 8.4–10.4)
CHLORIDE SERPL-SCNC: 100 MMOL/L — SIGNIFICANT CHANGE UP (ref 98–110)
CO2 SERPL-SCNC: 24 MMOL/L — SIGNIFICANT CHANGE UP (ref 17–32)
CREAT SERPL-MCNC: 1 MG/DL — SIGNIFICANT CHANGE UP (ref 0.7–1.5)
EGFR: 61 ML/MIN/1.73M2 — SIGNIFICANT CHANGE UP
EOSINOPHIL # BLD AUTO: 0.13 K/UL — SIGNIFICANT CHANGE UP (ref 0–0.7)
EOSINOPHIL NFR BLD AUTO: 2.2 % — SIGNIFICANT CHANGE UP (ref 0–8)
FLUAV AG NPH QL: SIGNIFICANT CHANGE UP
FLUBV AG NPH QL: SIGNIFICANT CHANGE UP
GLUCOSE SERPL-MCNC: 141 MG/DL — HIGH (ref 70–99)
HCT VFR BLD CALC: 40 % — SIGNIFICANT CHANGE UP (ref 37–47)
HGB BLD-MCNC: 13 G/DL — SIGNIFICANT CHANGE UP (ref 12–16)
IMM GRANULOCYTES NFR BLD AUTO: 0.3 % — SIGNIFICANT CHANGE UP (ref 0.1–0.3)
LYMPHOCYTES # BLD AUTO: 3.47 K/UL — HIGH (ref 1.2–3.4)
LYMPHOCYTES # BLD AUTO: 59.8 % — HIGH (ref 20.5–51.1)
MCHC RBC-ENTMCNC: 27.7 PG — SIGNIFICANT CHANGE UP (ref 27–31)
MCHC RBC-ENTMCNC: 32.5 G/DL — SIGNIFICANT CHANGE UP (ref 32–37)
MCV RBC AUTO: 85.1 FL — SIGNIFICANT CHANGE UP (ref 81–99)
MONOCYTES # BLD AUTO: 0.4 K/UL — SIGNIFICANT CHANGE UP (ref 0.1–0.6)
MONOCYTES NFR BLD AUTO: 6.9 % — SIGNIFICANT CHANGE UP (ref 1.7–9.3)
NEUTROPHILS # BLD AUTO: 1.74 K/UL — SIGNIFICANT CHANGE UP (ref 1.4–6.5)
NEUTROPHILS NFR BLD AUTO: 30.1 % — LOW (ref 42.2–75.2)
NRBC # BLD: 0 /100 WBCS — SIGNIFICANT CHANGE UP (ref 0–0)
PLATELET # BLD AUTO: 318 K/UL — SIGNIFICANT CHANGE UP (ref 130–400)
PMV BLD: 9.1 FL — SIGNIFICANT CHANGE UP (ref 7.4–10.4)
POTASSIUM SERPL-MCNC: SIGNIFICANT CHANGE UP MMOL/L (ref 3.5–5)
POTASSIUM SERPL-SCNC: SIGNIFICANT CHANGE UP MMOL/L (ref 3.5–5)
PROT SERPL-MCNC: 7.6 G/DL — SIGNIFICANT CHANGE UP (ref 6–8)
RBC # BLD: 4.7 M/UL — SIGNIFICANT CHANGE UP (ref 4.2–5.4)
RBC # FLD: 13.8 % — SIGNIFICANT CHANGE UP (ref 11.5–14.5)
RSV RNA NPH QL NAA+NON-PROBE: SIGNIFICANT CHANGE UP
SARS-COV-2 RNA SPEC QL NAA+PROBE: SIGNIFICANT CHANGE UP
SODIUM SERPL-SCNC: 135 MMOL/L — SIGNIFICANT CHANGE UP (ref 135–146)
TROPONIN T, HIGH SENSITIVITY RESULT: 12 NG/L — SIGNIFICANT CHANGE UP (ref 6–13)
WBC # BLD: 5.8 K/UL — SIGNIFICANT CHANGE UP (ref 4.8–10.8)
WBC # FLD AUTO: 5.8 K/UL — SIGNIFICANT CHANGE UP (ref 4.8–10.8)

## 2025-01-07 PROCEDURE — 99285 EMERGENCY DEPT VISIT HI MDM: CPT | Mod: 25

## 2025-01-07 PROCEDURE — 71046 X-RAY EXAM CHEST 2 VIEWS: CPT | Mod: 26

## 2025-01-07 PROCEDURE — 93010 ELECTROCARDIOGRAM REPORT: CPT

## 2025-01-07 PROCEDURE — 71046 X-RAY EXAM CHEST 2 VIEWS: CPT

## 2025-01-07 PROCEDURE — 85025 COMPLETE CBC W/AUTO DIFF WBC: CPT

## 2025-01-07 PROCEDURE — 84484 ASSAY OF TROPONIN QUANT: CPT

## 2025-01-07 PROCEDURE — 93005 ELECTROCARDIOGRAM TRACING: CPT

## 2025-01-07 PROCEDURE — 94640 AIRWAY INHALATION TREATMENT: CPT

## 2025-01-07 PROCEDURE — 80053 COMPREHEN METABOLIC PANEL: CPT

## 2025-01-07 PROCEDURE — 36415 COLL VENOUS BLD VENIPUNCTURE: CPT

## 2025-01-07 PROCEDURE — 99285 EMERGENCY DEPT VISIT HI MDM: CPT

## 2025-01-07 PROCEDURE — 0241U: CPT

## 2025-01-07 RX ORDER — IPRATROPIUM BROMIDE AND ALBUTEROL SULFATE .5; 2.5 MG/3ML; MG/3ML
3 SOLUTION RESPIRATORY (INHALATION) ONCE
Refills: 0 | Status: COMPLETED | OUTPATIENT
Start: 2025-01-07 | End: 2025-01-07

## 2025-01-07 RX ORDER — PREDNISONE 5 MG
50 TABLET ORAL ONCE
Refills: 0 | Status: COMPLETED | OUTPATIENT
Start: 2025-01-07 | End: 2025-01-07

## 2025-01-07 RX ORDER — BENZONATATE 100 MG
1 CAPSULE ORAL
Qty: 21 | Refills: 0
Start: 2025-01-07 | End: 2025-01-13

## 2025-01-07 RX ADMIN — IPRATROPIUM BROMIDE AND ALBUTEROL SULFATE 3 MILLILITER(S): .5; 2.5 SOLUTION RESPIRATORY (INHALATION) at 20:11

## 2025-01-07 RX ADMIN — Medication 50 MILLIGRAM(S): at 22:59

## 2025-01-07 RX ADMIN — IPRATROPIUM BROMIDE AND ALBUTEROL SULFATE 3 MILLILITER(S): .5; 2.5 SOLUTION RESPIRATORY (INHALATION) at 22:59

## 2025-01-07 NOTE — ED PROVIDER NOTE - NSFOLLOWUPINSTRUCTIONS_ED_ALL_ED_FT
Please follow up with your primary care doctor in 1-3 days  Please be aware of any new or worsening signs or symptoms that should prompt your return to the ER.      Acute Cough    WHAT YOU NEED TO KNOW:    An acute cough can last up to 3 weeks. Common causes of an acute cough include a cold, allergies, or a lung infection.     DISCHARGE INSTRUCTIONS:    Return to the emergency department if:     You have trouble breathing or feel short of breath.      You cough up blood, or you see blood in your mucus.       You faint or feel weak or dizzy.       You have chest pain when you cough or take a deep breath.       You have new wheezing.     Contact your healthcare provider if:     You have a fever.       Your cough lasts longer than 4 weeks.       Your symptoms do not improve with treatment.       You have questions or concerns about your condition or care.     Medicines:     Medicines may be needed to stop the cough, decrease swelling in your airways, or help open your airways. Medicine may also be given to help you cough up mucus. Ask your healthcare provider what over-the-counter medicines you can take. If you have an infection caused by bacteria, you may need antibiotics.       Take your medicine as directed. Contact your healthcare provider if you think your medicine is not helping or if you have side effects. Tell him or her if you are allergic to any medicine. Keep a list of the medicines, vitamins, and herbs you take. Include the amounts, and when and why you take them. Bring the list or the pill bottles to follow-up visits. Carry your medicine list with you in case of an emergency.    Manage your symptoms:     Do not smoke and stay away from others who smoke. Nicotine and other chemicals in cigarettes and cigars can cause lung damage and make your cough worse. Ask your healthcare provider for information if you currently smoke and need help to quit. E-cigarettes or smokeless tobacco still contain nicotine. Talk to your healthcare provider before you use these products.       Drink extra liquids as directed. Liquids will help thin and loosen mucus so you can cough it up. Liquids will also help prevent dehydration. Examples of good liquids to drink include water, fruit juice, and broth. Do not drink liquids that contain caffeine. Caffeine can increase your risk for dehydration. Ask your healthcare provider how much liquid to drink each day.       Rest as directed. Do not do activities that make your cough worse, such as exercise.       Use a humidifier or vaporizer. Use a cool mist humidifier or a vaporizer to increase air moisture in your home. This may make it easier for you to breathe and help decrease your cough.       Eat 2 to 5 mL of honey 2 times each day. Honey can help thin mucus and decrease your cough.       Use cough drops or lozenges. These can help decrease throat irritation and your cough.     Follow up with your healthcare provider as directed: Write down your questions so you remember to ask them during your visits.        © Copyright [employer] 2019 All illustrations and images included in CareNotes are the copyrighted property of [employer] or [employer].

## 2025-01-07 NOTE — ED PROVIDER NOTE - OBJECTIVE STATEMENT
69 year old female, past medical history cad s/p 2 stents, htn, hdl, dm, who presents with cough. patient with uri symptoms and cough that began 12/31, given abx/prednisone with moderate improvement of uri symptoms. patient with cough that has been persistent with central chest wall pain. denies fever, chills, shortness of breath, hemoptysis, vomiting/diarrhea, leg pain/swelling. patient follows with dr ellis, cardiologist.

## 2025-01-07 NOTE — ED ADULT TRIAGE NOTE - CHIEF COMPLAINT QUOTE
pt c/o cough worsening x2 weeks. pt given prednisone, inhaler, nebulizer  and teslon pearls with no relief.

## 2025-01-07 NOTE — ED PROVIDER NOTE - CARE PROVIDER_API CALL
Brianna Horner  Internal Medicine  [address] [place] [id number]  Phone: [phone]  Fax: [phone]  Follow Up Time: 1-3 Days

## 2025-01-07 NOTE — ED PROVIDER NOTE - CLINICAL SUMMARY MEDICAL DECISION MAKING FREE TEXT BOX
69-year-old woman, history of hypertension, diabetes, hyperlipidemia, presents with cough, persistent since 12/31.  Patient began with URI symptoms, was treated by PMD with antibiotics and prednisone and improved somewhat.  Cough still present, however, now associated with chest wall discomfort, nonexertional, no associated shortness of breath.  Patient follows with Dr. Monroy for cardiology, had an abnormal nuclear stress test in March followed by cardiac cath which revealed patent stents.  Today, symptoms seem more pulmonary/postinfectious than cardiac in etiology.  Labs reassuring, chest x-ray no infiltrate, EKG interpreted by me is normal sinus rhythm at 83, normal axis, normal intervals, no acute ST ST changes. CXR no infiltrate. Results were discussed with patient and she was reassured that there does not seem to be a new infectious process.  Will try Tessalon Perles for symptomatic relief, prompt PMD follow-up.  Patient is comfortable with discharge.

## 2025-01-07 NOTE — ED PROVIDER NOTE - PHYSICAL EXAMINATION
CONSTITUTIONAL: non-toxic appearing female, no acute distress  SKIN: skin exam is warm and dry  HEAD: Normocephalic; atraumatic  EYES: PERRL, EOM intact; conjunctiva and sclera clear.  ENT: MMM  NECK:  ROM intact.  CARD: S1, S2 normal, no murmur  RESP: No wheezes, rales or rhonchi. Good air movement bilaterally  ABD: soft; non-distended; non-tender.    NEURO: awake, alert, following commands, oriented, grossly unremarkable. No Focal deficits. GCS 15.   PSYCH: Cooperative

## 2025-01-07 NOTE — ED PROVIDER NOTE - PATIENT PORTAL LINK FT
You can access the FollowMyHealth Patient Portal offered by [facility] by registering at the following website: [email or web address]. By joining [facility]’s FollowMyHealth portal, you will also be able to view your health information using other applications (apps) compatible with our system.

## 2025-01-27 ENCOUNTER — APPOINTMENT (OUTPATIENT)
Dept: CARDIOLOGY | Facility: CLINIC | Age: 70
End: 2025-01-27
Payer: MEDICARE

## 2025-01-27 ENCOUNTER — NON-APPOINTMENT (OUTPATIENT)
Age: 70
End: 2025-01-27

## 2025-01-27 VITALS
DIASTOLIC BLOOD PRESSURE: 60 MMHG | HEIGHT: 65 IN | HEART RATE: 80 BPM | SYSTOLIC BLOOD PRESSURE: 124 MMHG | BODY MASS INDEX: 33.66 KG/M2 | WEIGHT: 202 LBS | OXYGEN SATURATION: 98 %

## 2025-01-27 DIAGNOSIS — E78.5 HYPERLIPIDEMIA, UNSPECIFIED: ICD-10-CM

## 2025-01-27 DIAGNOSIS — I10 ESSENTIAL (PRIMARY) HYPERTENSION: ICD-10-CM

## 2025-01-27 DIAGNOSIS — E11.59 TYPE 2 DIABETES MELLITUS WITH OTHER CIRCULATORY COMPLICATIONS: ICD-10-CM

## 2025-01-27 DIAGNOSIS — Z98.61 ATHEROSCLEROTIC HEART DISEASE OF NATIVE CORONARY ARTERY W/OUT ANGINA PECTORIS: ICD-10-CM

## 2025-01-27 DIAGNOSIS — I25.10 ATHEROSCLEROTIC HEART DISEASE OF NATIVE CORONARY ARTERY W/OUT ANGINA PECTORIS: ICD-10-CM

## 2025-01-27 PROCEDURE — 99214 OFFICE O/P EST MOD 30 MIN: CPT | Mod: 25

## 2025-01-27 PROCEDURE — 93000 ELECTROCARDIOGRAM COMPLETE: CPT

## 2025-01-27 RX ORDER — 70%ISOPROPYL ALCOHOL 0.7 ML/ML
70 SWAB TOPICAL
Qty: 100 | Refills: 0 | Status: ACTIVE | COMMUNITY
Start: 2024-12-27

## 2025-01-27 RX ORDER — BUDESONIDE AND FORMOTEROL FUMARATE DIHYDRATE 160; 4.5 UG/1; UG/1
160-4.5 AEROSOL RESPIRATORY (INHALATION)
Qty: 10 | Refills: 0 | Status: ACTIVE | COMMUNITY
Start: 2024-12-26

## 2025-02-18 ENCOUNTER — APPOINTMENT (OUTPATIENT)
Dept: SURGERY | Facility: CLINIC | Age: 70
End: 2025-02-18
Payer: MEDICARE

## 2025-02-18 VITALS
HEIGHT: 65 IN | OXYGEN SATURATION: 98 % | DIASTOLIC BLOOD PRESSURE: 86 MMHG | TEMPERATURE: 97 F | WEIGHT: 204 LBS | SYSTOLIC BLOOD PRESSURE: 128 MMHG | BODY MASS INDEX: 33.99 KG/M2 | HEART RATE: 97 BPM

## 2025-02-18 DIAGNOSIS — K86.2 CYST OF PANCREAS: ICD-10-CM

## 2025-02-18 DIAGNOSIS — Z87.19 PERSONAL HISTORY OF OTHER DISEASES OF THE DIGESTIVE SYSTEM: ICD-10-CM

## 2025-02-18 PROCEDURE — 99214 OFFICE O/P EST MOD 30 MIN: CPT

## 2025-05-12 ENCOUNTER — APPOINTMENT (OUTPATIENT)
Dept: CARDIOLOGY | Facility: CLINIC | Age: 70
End: 2025-05-12

## 2025-05-19 ENCOUNTER — APPOINTMENT (OUTPATIENT)
Dept: CARDIOLOGY | Facility: CLINIC | Age: 70
End: 2025-05-19
Payer: MEDICARE

## 2025-05-19 ENCOUNTER — RESULT CHARGE (OUTPATIENT)
Age: 70
End: 2025-05-19

## 2025-05-19 VITALS
HEART RATE: 81 BPM | DIASTOLIC BLOOD PRESSURE: 60 MMHG | SYSTOLIC BLOOD PRESSURE: 108 MMHG | BODY MASS INDEX: 33.99 KG/M2 | WEIGHT: 204 LBS | HEIGHT: 65 IN

## 2025-05-19 DIAGNOSIS — I25.10 ATHEROSCLEROTIC HEART DISEASE OF NATIVE CORONARY ARTERY W/OUT ANGINA PECTORIS: ICD-10-CM

## 2025-05-19 DIAGNOSIS — E11.59 TYPE 2 DIABETES MELLITUS WITH OTHER CIRCULATORY COMPLICATIONS: ICD-10-CM

## 2025-05-19 DIAGNOSIS — E78.5 HYPERLIPIDEMIA, UNSPECIFIED: ICD-10-CM

## 2025-05-19 DIAGNOSIS — I10 ESSENTIAL (PRIMARY) HYPERTENSION: ICD-10-CM

## 2025-05-19 DIAGNOSIS — Z98.61 ATHEROSCLEROTIC HEART DISEASE OF NATIVE CORONARY ARTERY W/OUT ANGINA PECTORIS: ICD-10-CM

## 2025-05-19 PROCEDURE — 99214 OFFICE O/P EST MOD 30 MIN: CPT | Mod: 25

## 2025-05-19 PROCEDURE — 93000 ELECTROCARDIOGRAM COMPLETE: CPT

## 2025-05-29 NOTE — PATIENT PROFILE ADULT - DO YOU FEEL LIKE HURTING YOURSELF OR OTHERS?
PULMONARY CONSULT NOTE    Date of Service:  05/29/25    REQUESTING PHYSICIAN:  Cheyanne Sanchez CNP    REASON FOR CONSULT:  Evaluation for sleep disorder.    HISTORY OF PRESENT ILLNESS:  Gallito Crane is a 53 year old female who is seen at the request of Cheyanne Sanchez CNP.    She follows up with Neurology for chronic migraines and receives Botox injections.  She reports that over the last 1-1/2 years she has been falling asleep easily.  This may occur while she is watching TV, on her phone, or even while standing.  Once she hit her head.  She reports feeling tired while driving.  She currently lives alone, but family or friends have mentioned that she snores occasionally.  Sometimes she wakes up with a snore.  She has mild anxiety and is taking trazodone 50 mg once a week for insomnia, with benefit.  She mentions that her father snored and likely has sleep apnea.  No history of narcolepsy in the family.    She usually watches TV and falls asleep between 9 and 10:30 p.m. on the couch.  Her usual wake-up time is 10 a.m.  It takes her a few minutes to fall asleep.  She may wake up once at night to move to the bedroom.  She is able to fall back to sleep quickly.  Denies being restless in bed.  No issues with leg kicking, sleep talking, or sleep walking.  She denies any history of sleep paralysis, vivid hallucinations, or cataplexy.    In the morning, she feels tired.  Does not have a headache or dry mouth.  Has an occasional coffee and 1 soda daily.  She may have an energy drink on a long drive.  She reports feeling tired while driving.  Sometimes she has to pull over and walk around.  At work, she is part-time and is alert.  She tends to take a nap for 2 hours daily, but does not notice any improvement.  If she is watching TV, she may fall asleep.    She mentions restless leg symptoms, where her legs feel uncomfortable, jerky, and she has to move them.  Sometimes she has to get up and walk.  She was taking magnesium  with some benefit previously.  The symptoms could also go to her arms and shoulders.  She denies being anemic.  No history of head injury or meningitis.    She has lost about 20 pounds over the last 5 years.  She is currently on weight loss medications.    PAST MEDICAL HISTORY, SURGICAL HISTORY, FAMILY HISTORY, SOCIAL HISTORY, ALLERGIES, AND MEDICATIONS:  Reviewed and confirmed as per Epic Smart Chart.    REVIEW OF SYSTEMS:  Blurry vision, dizziness, tingling in the arms and hands, tired, nausea.  All other systems reviewed and were negative.    PHYSICAL EXAMINATION:  Vitals:    05/29/25 1315   BP: 112/70   Pulse: 77   Resp: 18   SpO2: 99%  Comment: at rest RA   Weight: 78.5 kg (173 lb 1.6 oz)   Height: 5' 1\" (1.549 m)   [place] is 17.   GENERAL:  Obese, in no acute distress.  HEENT:  Pupils equal, round, react to light.  Oropharynx is clean and moist.  Sclerae are anicteric.  Mallampati class 3.  Castillo score 3.  Retrognathia.  NECK:  Does not show any evidence of thyromegaly or JVD.  LYMPHATIC:  No cervical or supraclavicular lymph nodes palpable.  LUNGS:  Symmetric expansion, easy respirations, clear to auscultation.  CARDIOVASCULAR:  S1, S2 are heard, regular rate and rhythm.  No murmur appreciable.  ABDOMEN:  Soft, nontender.  No hepatosplenomegaly.  Obese.  MUSCULOSKELETAL:  No cyanosis, clubbing, or edema.  NEUROLOGIC:  Cranial nerves appear to be intact.  The patient is moving all 4 extremities equally.  PSYCHIATRIC:  Oriented x3, appropriate mood.    REVIEW OF DATA:  Nurse note reviewed and accepted.    Labs 01/06/2025 showed normal CBC and basic chemistry panel.  Venous blood gas 05/14/2021, 7.39/49/30.  Ferritin 05/17/2021, 397.  TSH 12/06/2023, 0.97.    Chest x-ray 05/25/2021.  Films are reviewed.  Mild patchy interstitial opacities in the right lower and left mid lung.    Echocardiogram, 10/26/2012, normal LV size and function.  Ejection fraction 60%.  Normal RV size and function.  Mild mitral valve  regurgitation.  Mild to moderate aortic insufficiency.  PASP estimated at 21 mm.    ASSESSMENT AND PLAN:  Hypersomnolence:  Daytime fatigue along with unrefreshing sleep.  This may be related to underlying sleep disorder such as sleep-disordered breathing, idiopathic hypersomnia, narcolepsy, alpha intrusions, or periodic limb movement syndrome.  Further evaluation would be indicated with the help of a complete overnight polysomnogram followed by a multiple sleep latency test.  She was explained regarding the testing protocol.  I have advised her not to drive if sleepy.  Restless legs syndrome:  Present and appears to be interfering with day-to-day quality of life.  Ferritin level will be ordered.  If low, I would recommend iron supplements.  Obesity:  She is encouraged to lose weight with the help of diet and exercise, as tolerated.    Return for Sleep problem, 1 week after sleep study.    Thank you for the consultation.      Dictated By:  Daquan Castillo MD  Signing Provider:  Daquan Castillo MD    NIG/AQT  D:  05/29/2025 03:34:28 PM  T:  05/29/2025 04:46:57 PM  Job:  [id number]  CC:  Cheyanne Sanchez CNP     no